# Patient Record
Sex: MALE | Race: WHITE | NOT HISPANIC OR LATINO | Employment: UNEMPLOYED | URBAN - METROPOLITAN AREA
[De-identification: names, ages, dates, MRNs, and addresses within clinical notes are randomized per-mention and may not be internally consistent; named-entity substitution may affect disease eponyms.]

---

## 2017-05-01 ENCOUNTER — OFFICE VISIT (OUTPATIENT)
Dept: PEDIATRICS | Facility: CLINIC | Age: 82
End: 2017-05-01
Payer: COMMERCIAL

## 2017-05-01 VITALS
SYSTOLIC BLOOD PRESSURE: 136 MMHG | TEMPERATURE: 97.3 F | BODY MASS INDEX: 21.92 KG/M2 | WEIGHT: 135.8 LBS | OXYGEN SATURATION: 98 % | HEART RATE: 66 BPM | DIASTOLIC BLOOD PRESSURE: 60 MMHG

## 2017-05-01 DIAGNOSIS — M54.41 CHRONIC RIGHT-SIDED LOW BACK PAIN WITH RIGHT-SIDED SCIATICA: Primary | ICD-10-CM

## 2017-05-01 DIAGNOSIS — M51.369 DDD (DEGENERATIVE DISC DISEASE), LUMBAR: ICD-10-CM

## 2017-05-01 DIAGNOSIS — M99.53 INTERVERTEBRAL DISC STENOSIS OF NEURAL CANAL OF LUMBAR REGION: ICD-10-CM

## 2017-05-01 DIAGNOSIS — G89.29 CHRONIC RIGHT-SIDED LOW BACK PAIN WITH RIGHT-SIDED SCIATICA: Primary | ICD-10-CM

## 2017-05-01 PROCEDURE — 99214 OFFICE O/P EST MOD 30 MIN: CPT | Performed by: INTERNAL MEDICINE

## 2017-05-01 NOTE — MR AVS SNAPSHOT
"              After Visit Summary   2017    Jessica Mckeon    MRN: [id number]           Patient Information     Date Of Birth          10/26/1929        Visit Information        Provider Department      2017 1:30 PM Kale Thompson MD; [place] [employer] [facility] Laureen        Today's Diagnoses     Chronic right-sided low back pain with right-sided sciatica    -  1      Care Instructions    [facility] Radiology will contact you to schedule an MRI of your low back   Or call Radiology [phone]         Follow-ups after your visit        Future tests that were ordered for you today     Open Future Orders        Priority Expected Expires Ordered    MR Lumbar Spine w/o Contrast Routine  2018            Who to contact     If you have questions or need follow up information about today's clinic visit or your schedule please contact [facility] directly at [phone].  Normal or non-critical lab and imaging results will be communicated to you by MyChart, letter or phone within 4 business days after the clinic has received the results. If you do not hear from us within 7 days, please contact the clinic through iMemories[employer] or phone. If you have a critical or abnormal lab result, we will notify you by phone as soon as possible.  Submit refill requests through [employer] ([employer]) or call your pharmacy and they will forward the refill request to us. Please allow 3 business days for your refill to be completed.          Additional Information About Your Visit        MyChart Information     [employer] lets you send messages to your doctor, view your test results, renew your prescriptions, schedule appointments and more. To sign up, go to [email or web address]) . Click on \"Log in\" on the left side of the screen, which will take you to the Welcome page. Then click on \"Sign up Now\" on the right side of the page.     You will be asked to enter the access code listed below, as " well as some personal information. Please follow the directions to create your username and password.     Your access code is: -[email or web address]  Expires: 2017  2:20 PM     Your access code will  in 90 days. If you need help or a new code, please call your [place] clinic or [phone].        Care EveryWhere ID     This is your Care EveryWhere ID. This could be used by other organizations to access your [place] medical records  [id number]        Your Vitals Were     Pulse Temperature Pulse Oximetry BMI (Body Mass Index)          66 97.3  F (36.3  C) (Oral) 98% 21.92 kg/m2         Blood Pressure from Last 3 Encounters:   17 136/60   10/28/16 148/80   10/26/16 140/69    Weight from Last 3 Encounters:   17 135 lb 12.8 oz (61.6 kg)   10/28/16 135 lb (61.2 kg)   10/26/16 135 lb 8 oz (61.5 kg)               Primary Care Provider Office Phone # Fax #    Kale Thompson [phone]       [facility] [address] [place] [address]        Thank you!     Thank you for choosing [facility]  for your care. Our goal is always to provide you with excellent care. Hearing back from our patients is one way we can continue to improve our services. Please take a few minutes to complete the written survey that you may receive in the mail after your visit with us. Thank you!             Your Updated Medication List - Protect others around you: Learn how to safely use, store and throw away your medicines at www.disposemymeds.org.          This list is accurate as of: 17  2:20 PM.  Always use your most recent med list.                   Brand Name Dispense Instructions for use    alfuzosin 10 MG 24 hr tablet    UROXATRAL    90 tablet    Take 1 tablet (10 mg) by mouth daily       amLODIPine 5 MG tablet    NORVASC    90 tablet    Take 1 tablet (5 mg) by mouth daily       aspirin 81 MG tablet     30 tablet    Take 1 tablet (81 mg) by mouth daily       atorvastatin 40 MG tablet     LIPITOR    90 tablet    Take 1 tablet (40 mg) by mouth At Bedtime       CALCIUM 600 + D PO          chlordiazePOXIDE 5 MG capsule    LIBRIUM    90 capsule    Take 1 capsule (5 mg) by mouth 3 times daily as needed for anxiety       CLONAZEPAM PO      Take 0.5 mg by mouth At Bedtime       DONEPEZIL HCL PO      Take 10 mg by mouth Reported on 5/1/2017       finasteride 5 MG tablet    PROSCAR    90 tablet    Take 1 tablet (5 mg) by mouth daily       fluticasone 50 MCG/ACT spray    FLONASE    16 g    Spray 1-2 sprays into both nostrils daily       hydrocortisone 25 MG Suppository    ANUSOL-HC    30 suppository    Place 1 suppository (25 mg) rectally 2 times daily as needed for hemorrhoids       LANSOPRAZOLE PO      Take 30 mg by mouth daily       lisinopril 10 MG tablet    PRINIVIL/ZESTRIL    90 tablet    Take 1 tablet (10 mg) by mouth daily       MAGNESIUM PO      Take by mouth daily Reported on 5/1/2017       metoprolol 25 MG 24 hr tablet    TOPROL-XL    90 tablet    Take 1 tablet (25 mg) by mouth daily       omega 3 1000 MG Caps      Reported on 5/1/2017       order for DME     2 each    Jobst Stockings. Knee high. Medium compression (20-30 mmHg). Dispense 2 pairs.       sennosides 8.6 MG tablet    SENOKOT    120 each    Take 1 tablet by mouth as needed       traMADol 50 MG tablet    ULTRAM    30 tablet    Take 1 tablet (50 mg) by mouth every 6 hours as needed for pain       vitamin D 1000 UNITS capsule      Take 1 capsule by mouth daily Reported on 5/1/2017

## 2017-05-01 NOTE — NURSING NOTE
"Chief Complaint   Patient presents with     Back Pain       Initial /60 (Cuff Size: Adult Regular)  Pulse 66  Temp 97.3  F (36.3  C) (Oral)  Wt 135 lb 12.8 oz (61.6 kg)  SpO2 98%  BMI 21.92 kg/m2 Estimated body mass index is 21.92 kg/(m^2) as calculated from the following:    Height as of 10/28/16: 5' 6\" (1.676 m).    Weight as of this encounter: 135 lb 12.8 oz (61.6 kg).  Medication Reconciliation: complete    Elizabeth Lovett   "

## 2017-05-01 NOTE — PATIENT INSTRUCTIONS
[facility] Radiology will contact you to schedule an MRI of your low back   Or call Radiology [phone]

## 2017-05-01 NOTE — PROGRESS NOTES
"  SUBJECTIVE:                                                    Jessica Mckeon is a 87 year old male who presents with an interpretor to clinic today for the following health issues:      Musculoskeletal problem/pain      Duration: 3-4 months.    Description  Location: Right side down the leg.     Intensity:  When laying down 9/10    Accompanying signs and symptoms: radiation of pain to right leg.     History  Previous similar problem: no   Previous evaluation:  Saw doctor in [place]. Pt had x-ray there. Doc said to f/u PCP and get MRI.     Precipitating or alleviating factors:  Trauma or overuse: no   Aggravating factors include: laying down.     Therapies tried and outcome: electric heating pad helps a little bit.      Initial pain began while in [place].  Had plain films while there, recommended having MRI done.    Was treated w/ pain medications - gabapentin was one.  When sitting or walking, no pain.  9-10/10 pain when lying flat.  Taking acetaminophen.     Has a cardiac stent, placed 3/22/2011:  \"2.75 x 15 mm Promus drug-eluting stent\"    Did have a cardiac MRI done last July w/o issue.  No other metal concerns.    Has hx of disc surgery at L4-5 about 25 years ago.    Problem list and histories reviewed & adjusted, as indicated.    Labs reviewed in EPIC    ROS:  Constitutional, HEENT, cardiovascular, pulmonary, gi and gu systems are negative, except as otherwise noted.    OBJECTIVE:                                                    /60 (Cuff Size: Adult Regular)  Pulse 66  Temp 97.3  F (36.3  C) (Oral)  Wt 135 lb 12.8 oz (61.6 kg)  SpO2 98%  BMI 21.92 kg/m2  Body mass index is 21.92 kg/(m^2).  GEN: No distress  SKIN: No rashes  NECK: Supple. No LAD or TM.  LUNGS: Clear to auscultation bilaterally. No rhonchi, rales, wheezes or retractions.  CV: Regular rate and rhythm.  No murmurs, rubs or gallops. Pulses 2+ radial.  ABD: Bowel sounds positive throughout. Soft, nontender, nondistended. No " organomegaly. No masses.  BACK: Tenderness with palpation along the mid to lower lumbar spine. Tenderness with palpation along paraspinal muscles especially to the right. No CVA tenderness.  EXTR: No LE edema. SLR w/ some increase in LBP.  NEURO: Alert, interactive. No focal motor deficits. Sensation is grossly intact BLE.   PSYCH: Normal affect. Well groomed. Good eye contact.      ASSESSMENT/PLAN:                                                        ICD-10-CM    1. Chronic right-sided low back pain with right-sided sciatica M54.41 MR Lumbar Spine w/o Contrast    G89.29    2. DDD (degenerative disc disease), lumbar M51.36 NEUROSURGERY REFERRAL   3. Intervertebral disc stenosis of neural canal of lumbar region M99.53 NEUROSURGERY REFERRAL   persistent low back pain w/ sciatica sx. Ongoing for the past several weeks. Having significant pain.    MRI ordered and reviewed:  MR Lumbar Spine w/o Contrast    Impression    IMPRESSION:  1. 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 Posterior decompressions are noted at L4-L5 and L5-S1 as  well.  2. Multilevel foraminal stenosis as above. A small synovial cyst is  noted in the dorsolateral aspect of the left L5 neural foramen.    ROBIN BOOTH MD     Recommend neurosurgery evaluation. Pt's son contacted and referral placed.   Question injection therapy vs surgical.   Call if pain worsens, can help with pain medication if needed      Kale Thompson MD  [facility]

## 2017-05-02 ENCOUNTER — TELEPHONE (OUTPATIENT)
Dept: PEDIATRICS | Facility: CLINIC | Age: 82
End: 2017-05-02

## 2017-05-02 DIAGNOSIS — E78.2 MIXED HYPERLIPIDEMIA: Primary | ICD-10-CM

## 2017-05-02 RX ORDER — ROSUVASTATIN CALCIUM 20 MG/1
20 TABLET, COATED ORAL DAILY
Qty: 90 TABLET | Refills: 1 | Status: SHIPPED | OUTPATIENT
Start: 2017-05-02 | End: 2017-08-04

## 2017-05-02 NOTE — TELEPHONE ENCOUNTER
Received request from pharmacy that patient wants to switch from Lipitor 40 MG to Crestor. Patient has not had a lipid profile in 2 yrs. Patient was just seen yesterday. Son's number is temporarily not in service.

## 2017-05-03 ENCOUNTER — HOSPITAL ENCOUNTER (OUTPATIENT)
Dept: MRI IMAGING | Facility: CLINIC | Age: 82
Discharge: HOME | End: 2017-05-03
Attending: INTERNAL MEDICINE | Admitting: INTERNAL MEDICINE
Payer: MEDICARE

## 2017-05-03 DIAGNOSIS — G89.29 CHRONIC RIGHT-SIDED LOW BACK PAIN WITH RIGHT-SIDED SCIATICA: ICD-10-CM

## 2017-05-03 DIAGNOSIS — M54.41 CHRONIC RIGHT-SIDED LOW BACK PAIN WITH RIGHT-SIDED SCIATICA: ICD-10-CM

## 2017-05-03 PROCEDURE — 72148 MRI LUMBAR SPINE W/O DYE: CPT

## 2017-05-08 ENCOUNTER — TRANSFERRED RECORDS (OUTPATIENT)
Dept: HEALTH INFORMATION MANAGEMENT | Facility: CLINIC | Age: 82
End: 2017-05-08

## 2017-06-13 DIAGNOSIS — K21.9 GASTROESOPHAGEAL REFLUX DISEASE WITHOUT ESOPHAGITIS: Primary | ICD-10-CM

## 2017-06-13 RX ORDER — RABEPRAZOLE SODIUM 20 MG/1
TABLET, DELAYED RELEASE ORAL
Qty: 90 TABLET | Refills: 3 | Status: SHIPPED | OUTPATIENT
Start: 2017-06-13 | End: 2017-08-04

## 2017-06-13 NOTE — TELEPHONE ENCOUNTER
RABEprazole (ACIPHEX) 20 MG tablet       Last Written Prescription Date: 3/8/2016  Last Fill Quantity: 90,  # refills: 1   Last Office Visit with [facility], [facility] or [facility] prescribing provider: 5/1/2017                                         Next 5 appointments (look out 90 days)     Jun 19, 2017  1:00 PM CDT   Office Visit with Kale Thompson MD   [facility]    [address] [place] [address]  [address] [place] [id number] [phone]

## 2017-06-16 ENCOUNTER — OFFICE VISIT (OUTPATIENT)
Dept: OPTOMETRY | Facility: CLINIC | Age: 82
End: 2017-06-16
Payer: COMMERCIAL

## 2017-06-16 DIAGNOSIS — Z96.1 PSEUDOPHAKIA: ICD-10-CM

## 2017-06-16 DIAGNOSIS — H52.03 HYPEROPIA WITH ASTIGMATISM AND PRESBYOPIA, BILATERAL: Primary | ICD-10-CM

## 2017-06-16 DIAGNOSIS — H04.123 DRY EYES: ICD-10-CM

## 2017-06-16 DIAGNOSIS — H52.203 HYPEROPIA WITH ASTIGMATISM AND PRESBYOPIA, BILATERAL: Primary | ICD-10-CM

## 2017-06-16 DIAGNOSIS — H01.001 BLEPHARITIS OF UPPER EYELIDS OF BOTH EYES, UNSPECIFIED TYPE: ICD-10-CM

## 2017-06-16 DIAGNOSIS — H52.4 HYPEROPIA WITH ASTIGMATISM AND PRESBYOPIA, BILATERAL: Primary | ICD-10-CM

## 2017-06-16 DIAGNOSIS — H01.004 BLEPHARITIS OF UPPER EYELIDS OF BOTH EYES, UNSPECIFIED TYPE: ICD-10-CM

## 2017-06-16 PROCEDURE — 92015 DETERMINE REFRACTIVE STATE: CPT | Performed by: OPTOMETRIST

## 2017-06-16 PROCEDURE — 92004 COMPRE OPH EXAM NEW PT 1/>: CPT | Performed by: OPTOMETRIST

## 2017-06-16 ASSESSMENT — REFRACTION_WEARINGRX
OS_AXIS: 001
OD_CYLINDER: +2.25
OD_ADD: +2.75
OS_CYLINDER: +1.75
OD_SPHERE: -0.75
OD_AXIS: 165
OS_SPHERE: -1.25
OS_ADD: +2.75

## 2017-06-16 ASSESSMENT — REFRACTION_MANIFEST
OD_SPHERE: -0.25
OS_AXIS: 177
OS_SPHERE: -0.75
OS_ADD: +2.50
OS_CYLINDER: +2.50
OD_ADD: +2.50
OD_AXIS: 176
OS_CYLINDER: +1.75
OD_CYLINDER: +2.25
OD_CYLINDER: +2.50
OS_AXIS: 007
OD_SPHERE: -0.75
OS_SPHERE: -1.25
OD_AXIS: 166
METHOD_AUTOREFRACTION: 1

## 2017-06-16 ASSESSMENT — VISUAL ACUITY
CORRECTION_TYPE: GLASSES
OS_CC: 20/20
OS_CC+: -1
METHOD: SNELLEN - LINEAR
OD_CC: 20/20
OD_CC: 20/30
OS_SC: 20/125
OS_CC: 20/30
OD_SC: 20/125

## 2017-06-16 ASSESSMENT — KERATOMETRY
METHOD_AUTO_MANUAL: AUTOMATED
OD_AXISANGLE2_DEGREES: 85
OS_AXISANGLE2_DEGREES: 91
OS_K1POWER_DIOPTERS: 42.00
OD_AXISANGLE_DEGREES: 175
OD_K1POWER_DIOPTERS: 42.00
OD_K2POWER_DIOPTERS: 44.75
OS_AXISANGLE_DEGREES: 1
OS_K2POWER_DIOPTERS: 44.62

## 2017-06-16 ASSESSMENT — CONF VISUAL FIELD
OD_NORMAL: 1
OS_NORMAL: 1

## 2017-06-16 ASSESSMENT — TONOMETRY
OS_IOP_MMHG: 15
IOP_METHOD: APPLANATION
OD_IOP_MMHG: 15

## 2017-06-16 ASSESSMENT — CUP TO DISC RATIO
OS_RATIO: 0.4
OD_RATIO: 0.45

## 2017-06-16 ASSESSMENT — EXTERNAL EXAM - LEFT EYE: OS_EXAM: MULTIPLE NEVI

## 2017-06-16 ASSESSMENT — SLIT LAMP EXAM - LIDS: COMMENTS: 2+ BLEPHARITIS

## 2017-06-16 NOTE — PATIENT INSTRUCTIONS
Blepharitis is a chronic or long term inflammation of the eyelids and eyelashes. It affects all ages. Causes include poor eyelid hygiene, excess oil production, staph bacteria or an allergic reaction.    Blepharitis may appear as greasy flakes on the base of the eyelashes , crusting of eyelashes and mild redness of the eyelid margins.  Sometimes it may result in an acute infection of a gland in the eyelid called a stye and sometimes painless firm nodules can form in the eyelid.    Treatment includes warm compresses and lid hygiene with an antimicrobial lid scrub and sometimes a prescription ointment.      Hot compresses/ warm soaks  Warm compresses are very beneficial to the normal functioning of the eye.   They help loosen up the eyelid debris that has collected on the eyelash follicles.  Eyelid cleansers maintain clean and healthy eyelid margins.      Directions for warm soaks  There are few methods for hot compresses. Moisten a washcloth with hot water, or microwave for 10 seconds, being careful to not get the cloth too hot.   Then put the washcloth onto your eyelids for 5 minutes. It will cool quickly so a rice pack or eyemask that can be heated and laid on top of the washcloth will help retain the heat.     Do this in am and pm  At bedtime follow with lid cleansing with ocusoft or sterilid lid cleanser  Overabundance of bacterial microorganisms along the eyelashes and lid margins induce stress on the tear film and promote inflammation.  Regular lid hygiene helps diminish the bacterial population to prevent inflammation and infection.  Use a warm compress to loosen crusts   Cleanse lids once daily with a lid cleansing product as directed such as Ocusoft or Sterilid which can be purchased at most pharmacies. Diluted baby shampoo will also work, but not as well.     I recommend using artificial tears for your dry eye. There are over the counter drops that work well and may be used up to 4 x daily. ( systane ,  twila dietrich) If you need more than 4 drops daily, use a preservative free product which come in individual vials and may be used for 24 hours and discarded.     No prescription change needed      You may use ointment for a week 1-2x daily then discontinue

## 2017-06-16 NOTE — MR AVS SNAPSHOT
After Visit Summary   6/16/2017    Jessica Mckeon    MRN: [id number]           Patient Information     Date Of Birth          10/26/1929        Visit Information        Provider Department      6/16/2017 7:15 AM Open, Assignments; Anila Lehman, [facility]        Today's Diagnoses     Hyperopia with astigmatism and presbyopia, bilateral    -  1    Blepharitis of upper eyelids of both eyes, unspecified type        Dry eyes        Pseudophakia          Care Instructions    Blepharitis is a chronic or long term inflammation of the eyelids and eyelashes. It affects all ages. Causes include poor eyelid hygiene, excess oil production, staph bacteria or an allergic reaction.    Blepharitis may appear as greasy flakes on the base of the eyelashes , crusting of eyelashes and mild redness of the eyelid margins.  Sometimes it may result in an acute infection of a gland in the eyelid called a stye and sometimes painless firm nodules can form in the eyelid.    Treatment includes warm compresses and lid hygiene with an antimicrobial lid scrub and sometimes a prescription ointment.      Hot compresses/ warm soaks  Warm compresses are very beneficial to the normal functioning of the eye.   They help loosen up the eyelid debris that has collected on the eyelash follicles.  Eyelid cleansers maintain clean and healthy eyelid margins.      Directions for warm soaks  There are few methods for hot compresses. Moisten a washcloth with hot water, or microwave for 10 seconds, being careful to not get the cloth too hot.   Then put the washcloth onto your eyelids for 5 minutes. It will cool quickly so a rice pack or eyemask that can be heated and laid on top of the washcloth will help retain the heat.     Do this in am and pm  At bedtime follow with lid cleansing with ocusoft or sterilid lid cleanser  Overabundance of bacterial microorganisms along the eyelashes and lid margins induce stress on the  tear film and promote inflammation.  Regular lid hygiene helps diminish the bacterial population to prevent inflammation and infection.  Use a warm compress to loosen crusts   Cleanse lids once daily with a lid cleansing product as directed such as Ocusoft or Sterilid which can be purchased at most pharmacies. Diluted baby shampoo will also work, but not as well.     I recommend using artificial tears for your dry eye. There are over the counter drops that work well and may be used up to 4 x daily. ( systane , refresh, thera tears) If you need more than 4 drops daily, use a preservative free product which come in individual vials and may be used for 24 hours and discarded.     No prescription change needed      You may use ointment for a week 1-2x daily then discontinue             Follow-ups after your visit        Your next 10 appointments already scheduled     Jun 19, 2017 12:45 PM CDT   [facility]    [address] [place] [id number] [phone]           Bring a current list of meds and any records pertaining to this visit.  For Physicals, please bring immunization records and any forms needing to be filled out.  Please arrive 10 minutes early to complete paperwork.              Who to contact     If you have questions or need follow up information about today's clinic visit or your schedule please contact [facility] directly at [phone].  Normal or non-critical lab and imaging results will be communicated to you by MyChart, letter or phone within 4 business days after the clinic has received the results. If you do not hear from us within 7 days, please contact the clinic through MyChart or phone. If you have a critical or abnormal lab result, we will notify you by phone as soon as possible.  Submit refill requests through [employer] or call your pharmacy and they will forward the refill  "request to us. Please allow 3 business days for your refill to be completed.          Additional Information About Your Visit        [email or web address] Information     [email or web address] lets you send messages to your doctor, view your test results, renew your prescriptions, schedule appointments and more. To sign up, go to [email or web address] . Click on \"Log in\" on the left side of the screen, which will take you to the Welcome page. Then click on \"Sign up Now\" on the right side of the page.     You will be asked to enter the access code listed below, as well as some personal information. Please follow the directions to create your username and password.     Your access code is: -[email or web address]  Expires: 2017  2:20 PM     Your access code will  in 90 days. If you need help or a new code, please call your [place] clinic or [phone].        Care EveryWhere ID     This is your Care EveryWhere ID. This could be used by other organizations to access your [place] medical records  [id number]         Blood Pressure from Last 3 Encounters:   17 136/60   10/28/16 148/80   10/26/16 140/69    Weight from Last 3 Encounters:   17 61.6 kg (135 lb 12.8 oz)   10/28/16 61.2 kg (135 lb)   10/26/16 61.5 kg (135 lb 8 oz)              Today, you had the following     No orders found for display       Primary Care Provider Office Phone # Fax #    Kale Thompson [phone]       [facility] [address] [place] [address]        Thank you!     Thank you for choosing [facility]  for your care. Our goal is always to provide you with excellent care. Hearing back from our patients is one way we can continue to improve our services. Please take a few minutes to complete the written survey that you may receive in the mail after your visit with us. Thank you!             Your Updated Medication List - Protect others around you: Learn how to safely use, store and throw away your " medicines at www.disposemymeds.org.          This list is accurate as of: 6/16/17  7:53 AM.  Always use your most recent med list.                   Brand Name Dispense Instructions for use    alfuzosin 10 MG 24 hr tablet    UROXATRAL    90 tablet    Take 1 tablet (10 mg) by mouth daily       amLODIPine 5 MG tablet    NORVASC    90 tablet    Take 1 tablet (5 mg) by mouth daily       aspirin 81 MG tablet     30 tablet    Take 1 tablet (81 mg) by mouth daily       CALCIUM 600 + D PO          chlordiazePOXIDE 5 MG capsule    LIBRIUM    90 capsule    Take 1 capsule (5 mg) by mouth 3 times daily as needed for anxiety       CLONAZEPAM PO      Take 0.5 mg by mouth At Bedtime       DONEPEZIL HCL PO      Take 10 mg by mouth Reported on 5/1/2017       finasteride 5 MG tablet    PROSCAR    90 tablet    Take 1 tablet (5 mg) by mouth daily       fluticasone 50 MCG/ACT spray    FLONASE    16 g    Spray 1-2 sprays into both nostrils daily       hydrocortisone 25 MG Suppository    ANUSOL-HC    30 suppository    Place 1 suppository (25 mg) rectally 2 times daily as needed for hemorrhoids       LANSOPRAZOLE PO      Take 30 mg by mouth daily       lisinopril 10 MG tablet    PRINIVIL/ZESTRIL    90 tablet    Take 1 tablet (10 mg) by mouth daily       MAGNESIUM PO      Take by mouth daily Reported on 5/1/2017       metoprolol 25 MG 24 hr tablet    TOPROL-XL    90 tablet    Take 1 tablet (25 mg) by mouth daily       omega 3 1000 MG Caps      Reported on 5/1/2017       order for DME     2 each    Jobst Stockings. Knee high. Medium compression (20-30 mmHg). Dispense 2 pairs.       RABEprazole 20 MG EC tablet    ACIPHEX    90 tablet    TAKE ONE TABLET BY MOUTH ONCE DAILY       rosuvastatin 20 MG tablet    CRESTOR    90 tablet    Take 1 tablet (20 mg) by mouth daily       sennosides 8.6 MG tablet    SENOKOT    120 each    Take 1 tablet by mouth as needed       traMADol 50 MG tablet    ULTRAM    30 tablet    Take 1 tablet (50 mg) by mouth  every 6 hours as needed for pain       vitamin D 1000 UNITS capsule      Take 1 capsule by mouth daily Reported on 5/1/2017

## 2017-06-16 NOTE — PROGRESS NOTES
Chief Complaint   Patient presents with     COMPREHENSIVE EYE EXAM     Routine    Using Tobrex in OS, painful   He was given this in [place] by another doctor  Tearing and redness has used lid hygiene and artificial tears  And tobrex ointment which helped started with pain in OS last night    Last Eye Exam: 1yr  Dilated Previously: Yes    What are you currently using to see?  glasses       Distance Vision Acuity: Satisfied with vision    Near Vision Acuity: Satisfied with vision while reading  with glasses    Eye Comfort: OD painful  Do you use eye drops? : No  Occupation or Hobbies: TV              Medical, surgical and family histories reviewed and updated 6/16/2017.       OBJECTIVE: See Ophthalmology exam    ASSESSMENT:    ICD-10-CM    1. Hyperopia with astigmatism and presbyopia, bilateral H52.03 REFRACTION    H52.203 EYE EXAM (SIMPLE-NONBILLABLE)   2. Blepharitis of upper eyelids of both eyes, unspecified type H01.001 EYE EXAM (SIMPLE-NONBILLABLE)    H01.004    3. Dry eyes H04.123    4. Pseudophakia Z96.1       PLAN:   Ongoing blepharitis treatment , may continue with use of tobrex ointment for the next week and discontinue   Artificial tears  Up to four times daily  Patient is here with his wife who states lesions have been checked on his face by derm advised to follow    Anila Lehman OD

## 2017-06-19 ENCOUNTER — OFFICE VISIT (OUTPATIENT)
Dept: PEDIATRICS | Facility: CLINIC | Age: 82
End: 2017-06-19
Payer: COMMERCIAL

## 2017-06-19 VITALS
BODY MASS INDEX: 21.81 KG/M2 | SYSTOLIC BLOOD PRESSURE: 140 MMHG | OXYGEN SATURATION: 97 % | DIASTOLIC BLOOD PRESSURE: 60 MMHG | TEMPERATURE: 97.5 F | HEIGHT: 66 IN | WEIGHT: 135.7 LBS | HEART RATE: 61 BPM

## 2017-06-19 DIAGNOSIS — G25.81 RESTLESS LEGS: ICD-10-CM

## 2017-06-19 DIAGNOSIS — R42 VERTIGO: ICD-10-CM

## 2017-06-19 DIAGNOSIS — I25.10 ASCVD (ARTERIOSCLEROTIC CARDIOVASCULAR DISEASE): ICD-10-CM

## 2017-06-19 DIAGNOSIS — I10 ESSENTIAL HYPERTENSION: Primary | ICD-10-CM

## 2017-06-19 DIAGNOSIS — I95.1 ORTHOSTASIS: ICD-10-CM

## 2017-06-19 DIAGNOSIS — D64.9 ANEMIA, UNSPECIFIED TYPE: ICD-10-CM

## 2017-06-19 LAB
ERYTHROCYTE [DISTWIDTH] IN BLOOD BY AUTOMATED COUNT: 13.7 % (ref 10–15)
FERRITIN SERPL-MCNC: 32 NG/ML (ref 26–388)
HCT VFR BLD AUTO: 35.8 % (ref 40–53)
HGB BLD-MCNC: 11.7 G/DL (ref 13.3–17.7)
IRON SERPL-MCNC: 63 UG/DL (ref 35–180)
MCH RBC QN AUTO: 27.9 PG (ref 26.5–33)
MCHC RBC AUTO-ENTMCNC: 32.7 G/DL (ref 31.5–36.5)
MCV RBC AUTO: 85 FL (ref 78–100)
PLATELET # BLD AUTO: 169 10E9/L (ref 150–450)
RBC # BLD AUTO: 4.19 10E12/L (ref 4.4–5.9)
WBC # BLD AUTO: 6 10E9/L (ref 4–11)

## 2017-06-19 PROCEDURE — 36415 COLL VENOUS BLD VENIPUNCTURE: CPT | Performed by: INTERNAL MEDICINE

## 2017-06-19 PROCEDURE — 99215 OFFICE O/P EST HI 40 MIN: CPT | Performed by: INTERNAL MEDICINE

## 2017-06-19 PROCEDURE — 80053 COMPREHEN METABOLIC PANEL: CPT | Performed by: INTERNAL MEDICINE

## 2017-06-19 PROCEDURE — 83540 ASSAY OF IRON: CPT | Performed by: INTERNAL MEDICINE

## 2017-06-19 PROCEDURE — 85027 COMPLETE CBC AUTOMATED: CPT | Performed by: INTERNAL MEDICINE

## 2017-06-19 PROCEDURE — 82728 ASSAY OF FERRITIN: CPT | Performed by: INTERNAL MEDICINE

## 2017-06-19 PROCEDURE — 84443 ASSAY THYROID STIM HORMONE: CPT | Performed by: INTERNAL MEDICINE

## 2017-06-19 RX ORDER — METOPROLOL SUCCINATE 25 MG/1
12.5 TABLET, EXTENDED RELEASE ORAL DAILY
Qty: 90 TABLET | Refills: 0
Start: 2017-06-19 | End: 2017-07-31

## 2017-06-19 NOTE — LETTER
[facility]  [address]  [place] [address]                  [phone]   June 22, 2017    Neilobbo XIOMARA Mike  [address]  [place] [address]          Jessica:    Your tests are all complete. The results show:    1. Your TSH (thyroid function) is normal.    2. Your metabolic panel, including liver and kidney function, glucose (blood sugar) and electrolytes, is within expected limits.    3. Your blood counts show that you are mildly anemic (low red blood count or hemoglobin). Your current level is just slightly lower than your previous checks, but this should not cause any significant symptoms.    Your iron level and your ferritin (iron stores) are both in the normal range.    I do not see a specific cause for your symptoms based on these results. I will contact you once your echocardiogram is complete.       In the meantime, please feel free to contact me if you have any questions or concerns.      Kale Thompson MD      Results for orders placed or performed in visit on 06/19/17   CBC with platelets   Result Value Ref Range    WBC 6.0 4.0 - 11.0 10e9/L    RBC Count 4.19 (L) 4.4 - 5.9 10e12/L    Hemoglobin 11.7 (L) 13.3 - 17.7 g/dL    Hematocrit 35.8 (L) 40.0 - 53.0 %    MCV 85 78 - 100 fl    MCH 27.9 26.5 - 33.0 pg    MCHC 32.7 31.5 - 36.5 g/dL    RDW 13.7 10.0 - 15.0 %    Platelet Count 169 150 - 450 10e9/L   TSH with free T4 reflex   Result Value Ref Range    TSH 3.18 0.40 - 4.00 mU/L   Iron   Result Value Ref Range    Iron 63 35 - 180 ug/dL   Ferritin   Result Value Ref Range    Ferritin 32 26 - 388 ng/mL   Comprehensive metabolic panel   Result Value Ref Range    Sodium 139 133 - 144 mmol/L    Potassium 4.0 3.4 - 5.3 mmol/L    Chloride 105 94 - 109 mmol/L    Carbon Dioxide 29 20 - 32 mmol/L    Anion Gap 5 3 - 14 mmol/L    Glucose 98 70 - 99 mg/dL    Urea Nitrogen 28 7 - 30 mg/dL    Creatinine 1.17 0.66 - 1.25 mg/dL    GFR Estimate 59 (L) >60 mL/min/1.7m2    GFR  Estimate If Black 71 >60 mL/min/1.7m2    Calcium 8.5 8.5 - 10.1 mg/dL    Bilirubin Total 0.3 0.2 - 1.3 mg/dL    Albumin 4.1 3.4 - 5.0 g/dL    Protein Total 7.1 6.8 - 8.8 g/dL    Alkaline Phosphatase 49 40 - 150 U/L    ALT 17 0 - 70 U/L    AST 26 0 - 45 U/L

## 2017-06-19 NOTE — NURSING NOTE
"Chief Complaint   Patient presents with     Dizziness       Initial /60 (Cuff Size: Adult Regular)  Pulse 61  Temp 97.5  F (36.4  C) (Oral)  Ht 5' 6\" (1.676 m)  Wt 135 lb 11.2 oz (61.6 kg)  SpO2 97%  BMI 21.9 kg/m2 Estimated body mass index is 21.9 kg/(m^2) as calculated from the following:    Height as of this encounter: 5' 6\" (1.676 m).    Weight as of this encounter: 135 lb 11.2 oz (61.6 kg).  Medication Reconciliation: sujata Lovett   "

## 2017-06-19 NOTE — PATIENT INSTRUCTIONS
Check labwork today   I will contact you with the results    You will be contacted to schedule an echocardiogram (Heart ultrasound)   Or call [facility]:  [phone]    Cut your metoprolol dose in 1/2   Take 1/2 tablet once per day    Set up a visit with Dr. Kulkarni

## 2017-06-19 NOTE — PROGRESS NOTES
"    SUBJECTIVE:                                                    Jessica Mckeon is a 87 year old male who presents with an interpretor to clinic today for the following health issues:      Pt states that at bedtime his body \"jumps\" while he is sleeping. He is also have cramps in his calves. Pt states when he stands up he feels lightheaded.     Issues reviewed w/ the help of an interpretor:  - Feet feel cold at nighttime. Wearing extra socks.    - When he sleeps, his body \"jumps\" and has difficulty being still. Snores at nighttime. Dreams which are sometimes nightmares at nighttime. Will wake screaming at times.     - Feels that the muscles in his legs are tense.     - Feels \"dizzy\" at times. Especially with position changes, going from lying or sitting to standing. Sometimes w/ walking.  No recent changes in medications.    Has hx of ASCVD. No recent sx of chest pains. No orthopnea or PND.     Anemia hx. Reviewed last lab results.  Component      Latest Ref Rng & Units 3/3/2016 7/28/2016   Hemoglobin      13.3 - 17.7 g/dL 12.7 (A) 12.3 (L)     No sx of bleeding noted.     Problem list and histories reviewed & adjusted, as indicated.    Labs reviewed in EPIC    Reviewed and updated as needed this visit by clinical staff  Tobacco  Allergies  Meds  Med Hx  Surg Hx  Fam Hx  Soc Hx      Reviewed and updated as needed this visit by Provider  Tobacco  Allergies  Meds  Problems  Med Hx  Surg Hx  Fam Hx  Soc Hx          ROS:  Constitutional, HEENT, cardiovascular, pulmonary, gi and gu systems are negative, except as otherwise noted.    OBJECTIVE:                                                    /60 (Cuff Size: Adult Regular)  Pulse 61  Temp 97.5  F (36.4  C) (Oral)  Ht 5' 6\" (1.676 m)  Wt 135 lb 11.2 oz (61.6 kg)  SpO2 97%  BMI 21.9 kg/m2  Body mass index is 21.9 kg/(m^2).  GEN: No distress  SKIN: No rashes  HEENT: PERRL. EOMI. TM's clear bilaterally. Nasal mucosa normal. OP moist without " lesions.  NECK: Supple. No LAD or TM. No bruits.  LUNGS: Clear to auscultation bilaterally. No rhonchi, rales, wheezes or retractions.  CV: Regular rate and rhythm.  No murmurs, rubs or gallops. Pulses 2+ radial.  ABD: Bowel sounds positive throughout. Soft, nontender, nondistended. No organomegaly. No masses.  EXTR: trace LE edema.  PSYCH: Normal affect. Well groomed. Good eye contact.  NEURO: A, O x3. CN 2-12 grossly intact. No focal motor deficits.      ASSESSMENT/PLAN:                                                        ICD-10-CM    1. Essential hypertension I10 Comprehensive metabolic panel     Echocardiogram Complete     metoprolol (TOPROL-XL) 25 MG 24 hr tablet   2. Vertigo R42 CBC with platelets     TSH with free T4 reflex     Iron     Ferritin     Comprehensive metabolic panel     Echocardiogram Complete   3. Restless legs G25.81 CBC with platelets     Iron     Ferritin     Comprehensive metabolic panel   4. Orthostasis I95.1 CBC with platelets     TSH with free T4 reflex     Iron     Ferritin     Comprehensive metabolic panel     Echocardiogram Complete   5. ASCVD (arteriosclerotic cardiovascular disease) I25.10 TSH with free T4 reflex     metoprolol (TOPROL-XL) 25 MG 24 hr tablet   6. Anemia, unspecified type D64.9 CBC with platelets     TSH with free T4 reflex     Iron     Ferritin     HTN with vertigo sx w/ hx of anemia and ASCVD.   Clinically sx are not likely ASCVD related. Could be orthostatic. Potentially change in valve or heart function. Potentially anemia worsening w/ w/o iron deficiency.     Also w/ sx of restless legs, consider iron deficiency.     Patient Instructions   Check labwork today   I will contact you with the results    You will be contacted to schedule an echocardiogram (Heart ultrasound)   Or call [facility]:  [phone]    Cut your metoprolol dose in 1/2   Take 1/2 tablet once per day    Set up a visit with Dr. Kulkarni      A total of 45 minutes was spent in  face-to-face contact with Jessica willis in clinic, of which >50% was for counseling of vertigo symptoms, review of sx w/ pt and his interpretor, discussion about medication changes, possible causes for sx, labwork and other follow-up instructions.      Kale Thompson MD  [facility]

## 2017-06-19 NOTE — MR AVS SNAPSHOT
After Visit Summary   6/19/2017    Jessica Mckeon    MRN: [id number]           Patient Information     Date Of Birth          10/26/1929        Visit Information        Provider Department      6/19/2017 12:45 PM Kale Thompson MD; [facility]        Today's Diagnoses     Essential hypertension    -  1    Vertigo        Restless legs        Orthostasis        ASCVD (arteriosclerotic cardiovascular disease)        Anemia, unspecified type          Care Instructions    Check labwork today   I will contact you with the results    You will be contacted to schedule an echocardiogram (Heart ultrasound)   Or call [facility]:  [phone]    Cut your metoprolol dose in 1/2   Take 1/2 tablet once per day    Set up a visit with Dr. Kulkarni              Follow-ups after your visit        Future tests that were ordered for you today     Open Future Orders        Priority Expected Expires Ordered    Echocardiogram Complete Routine  6/19/2018 6/19/2017            Who to contact     If you have questions or need follow up information about today's clinic visit or your schedule please contact [facility] directly at [phone].  Normal or non-critical lab and imaging results will be communicated to you by [employer], letter or phone within 4 business days after the clinic has received the results. If you do not hear from us within 7 days, please contact the clinic through [employer] or phone. If you have a critical or abnormal lab result, we will notify you by phone as soon as possible.  Submit refill requests through L[employer] or call your pharmacy and they will forward the refill request to us. Please allow 3 business days for your refill to be completed.          Additional Information About Your Visit        MyChart Information     L2 lets you send messages to your doctor, view your test results, renew your prescriptions, schedule appointments and more. To sign  "up, go to [email or web address] . Click on \"Log in\" on the left side of the screen, which will take you to the Welcome page. Then click on \"Sign up Now\" on the right side of the page.     You will be asked to enter the access code listed below, as well as some personal information. Please follow the directions to create your username and password.     Your access code is: [email or web address]  Expires: 2017  2:20 PM     Your access code will  in 90 days. If you need help or a new code, please call your [place] clinic or [phone].        Care EveryWhere ID     This is your Care EveryWhere ID. This could be used by other organizations to access your [place] medical records  [id number]        Your Vitals Were     Pulse Temperature Height Pulse Oximetry BMI (Body Mass Index)       61 97.5  F (36.4  C) (Oral) 5' 6\" (1.676 m) 97% 21.9 kg/m2        Blood Pressure from Last 3 Encounters:   17 140/60   17 136/60   10/28/16 148/80    Weight from Last 3 Encounters:   17 135 lb 11.2 oz (61.6 kg)   17 135 lb 12.8 oz (61.6 kg)   10/28/16 135 lb (61.2 kg)              We Performed the Following     CBC with platelets     Comprehensive metabolic panel     Ferritin     Iron     TSH with free T4 reflex          Today's Medication Changes          These changes are accurate as of: 17  1:47 PM.  If you have any questions, ask your nurse or doctor.               These medicines have changed or have updated prescriptions.        Dose/Directions    metoprolol 25 MG 24 hr tablet   Commonly known as:  TOPROL-XL   This may have changed:  how much to take   Used for:  ASCVD (arteriosclerotic cardiovascular disease), Essential hypertension   Changed by:  Kale Thompson MD        Dose:  12.5 mg   Take 0.5 tablets (12.5 mg) by mouth daily   Quantity:  90 tablet   Refills:  0            Where to get your medicines      Some of these will need a paper prescription and others can be bought over the " counter.  Ask your nurse if you have questions.     You don't need a prescription for these medications     metoprolol 25 MG 24 hr tablet                Primary Care Provider Office Phone # Fax #    Kale Thompson [phone]       [facility] [address] [place] [address]        Thank you!     Thank you for choosing [facility]  for your care. Our goal is always to provide you with excellent care. Hearing back from our patients is one way we can continue to improve our services. Please take a few minutes to complete the written survey that you may receive in the mail after your visit with us. Thank you!             Your Updated Medication List - Protect others around you: Learn how to safely use, store and throw away your medicines at www.disposemymeds.org.          This list is accurate as of: 6/19/17  1:47 PM.  Always use your most recent med list.                   Brand Name Dispense Instructions for use    alfuzosin 10 MG 24 hr tablet    UROXATRAL    90 tablet    Take 1 tablet (10 mg) by mouth daily       amLODIPine 5 MG tablet    NORVASC    90 tablet    Take 1 tablet (5 mg) by mouth daily       aspirin 81 MG tablet     30 tablet    Take 1 tablet (81 mg) by mouth daily       CALCIUM 600 + D PO          chlordiazePOXIDE 5 MG capsule    LIBRIUM    90 capsule    Take 1 capsule (5 mg) by mouth 3 times daily as needed for anxiety       CLONAZEPAM PO      Take 0.5 mg by mouth At Bedtime       DONEPEZIL HCL PO      Take 10 mg by mouth Reported on 5/1/2017       finasteride 5 MG tablet    PROSCAR    90 tablet    Take 1 tablet (5 mg) by mouth daily       fluticasone 50 MCG/ACT spray    FLONASE    16 g    Spray 1-2 sprays into both nostrils daily       hydrocortisone 25 MG Suppository    ANUSOL-HC    30 suppository    Place 1 suppository (25 mg) rectally 2 times daily as needed for hemorrhoids       LANSOPRAZOLE PO      Take 30 mg by mouth daily       lisinopril 10 MG  tablet    PRINIVIL/ZESTRIL    90 tablet    Take 1 tablet (10 mg) by mouth daily       MAGNESIUM PO      Take by mouth daily Reported on 5/1/2017       metoprolol 25 MG 24 hr tablet    TOPROL-XL    90 tablet    Take 0.5 tablets (12.5 mg) by mouth daily       omega 3 1000 MG Caps      Reported on 5/1/2017       order for DME     2 each    Jobst Stockings. Knee high. Medium compression (20-30 mmHg). Dispense 2 pairs.       RABEprazole 20 MG EC tablet    ACIPHEX    90 tablet    TAKE ONE TABLET BY MOUTH ONCE DAILY       rosuvastatin 20 MG tablet    CRESTOR    90 tablet    Take 1 tablet (20 mg) by mouth daily       sennosides 8.6 MG tablet    SENOKOT    120 each    Take 1 tablet by mouth as needed       traMADol 50 MG tablet    ULTRAM    30 tablet    Take 1 tablet (50 mg) by mouth every 6 hours as needed for pain       vitamin D 1000 UNITS capsule      Take 1 capsule by mouth daily Reported on 5/1/2017

## 2017-06-20 LAB
ALBUMIN SERPL-MCNC: 4.1 G/DL (ref 3.4–5)
ALP SERPL-CCNC: 49 U/L (ref 40–150)
ALT SERPL W P-5'-P-CCNC: 17 U/L (ref 0–70)
ANION GAP SERPL CALCULATED.3IONS-SCNC: 5 MMOL/L (ref 3–14)
AST SERPL W P-5'-P-CCNC: 26 U/L (ref 0–45)
BILIRUB SERPL-MCNC: 0.3 MG/DL (ref 0.2–1.3)
BUN SERPL-MCNC: 28 MG/DL (ref 7–30)
CALCIUM SERPL-MCNC: 8.5 MG/DL (ref 8.5–10.1)
CHLORIDE SERPL-SCNC: 105 MMOL/L (ref 94–109)
CO2 SERPL-SCNC: 29 MMOL/L (ref 20–32)
CREAT SERPL-MCNC: 1.17 MG/DL (ref 0.66–1.25)
GFR SERPL CREATININE-BSD FRML MDRD: 59 ML/MIN/1.7M2
GLUCOSE SERPL-MCNC: 98 MG/DL (ref 70–99)
POTASSIUM SERPL-SCNC: 4 MMOL/L (ref 3.4–5.3)
PROT SERPL-MCNC: 7.1 G/DL (ref 6.8–8.8)
SODIUM SERPL-SCNC: 139 MMOL/L (ref 133–144)
TSH SERPL DL<=0.005 MIU/L-ACNC: 3.18 MU/L (ref 0.4–4)

## 2017-07-07 ENCOUNTER — HOSPITAL ENCOUNTER (OUTPATIENT)
Dept: CARDIOLOGY | Facility: CLINIC | Age: 82
Discharge: HOME | End: 2017-07-07
Attending: INTERNAL MEDICINE | Admitting: INTERNAL MEDICINE
Payer: MEDICARE

## 2017-07-07 DIAGNOSIS — I10 ESSENTIAL HYPERTENSION: ICD-10-CM

## 2017-07-07 DIAGNOSIS — R42 VERTIGO: ICD-10-CM

## 2017-07-07 DIAGNOSIS — I95.1 ORTHOSTASIS: ICD-10-CM

## 2017-07-07 PROCEDURE — 93306 TTE W/DOPPLER COMPLETE: CPT

## 2017-07-07 PROCEDURE — 93306 TTE W/DOPPLER COMPLETE: CPT | Mod: 26 | Performed by: INTERNAL MEDICINE

## 2017-07-28 DIAGNOSIS — N40.0 BENIGN PROSTATIC HYPERPLASIA: ICD-10-CM

## 2017-07-28 RX ORDER — FINASTERIDE 5 MG/1
TABLET, FILM COATED ORAL
Qty: 90 TABLET | Refills: 3 | Status: SHIPPED | OUTPATIENT
Start: 2017-07-28 | End: 2018-07-16

## 2017-07-28 NOTE — TELEPHONE ENCOUNTER
Proscar 5mg         Last Written Prescription Date: 7/7/16  Last Fill Quantity: 90, # refills: 3    Last Office Visit with G, [facility] or [facility] prescribing provider:  6/19/17   Future Office Visit:    Next 5 appointments (look out 90 days)     Aug 04, 2017  9:30 AM CDT   Return Visit with Jasvir Cartwright, AMARA RUSSELL, [employer]   [facility] ([facility])    [address] [place] [address] [phone]                  BP Readings from Last 3 Encounters:   06/19/17 140/60   05/01/17 136/60   10/28/16 148/80

## 2017-07-28 NOTE — TELEPHONE ENCOUNTER
Prescription approved per [facility] Refill Protocol.    Lo Zhang, MSN, RN-BC  Care Coordinator

## 2017-07-31 DIAGNOSIS — I10 ESSENTIAL HYPERTENSION: ICD-10-CM

## 2017-07-31 DIAGNOSIS — I25.10 CORONARY ARTERY DISEASE INVOLVING NATIVE CORONARY ARTERY OF NATIVE HEART WITHOUT ANGINA PECTORIS: ICD-10-CM

## 2017-07-31 DIAGNOSIS — I25.10 ASCVD (ARTERIOSCLEROTIC CARDIOVASCULAR DISEASE): ICD-10-CM

## 2017-07-31 DIAGNOSIS — I10 ESSENTIAL HYPERTENSION WITH GOAL BLOOD PRESSURE LESS THAN 140/90: ICD-10-CM

## 2017-07-31 RX ORDER — LISINOPRIL 10 MG/1
10 TABLET ORAL DAILY
Qty: 90 TABLET | Refills: 1 | Status: SHIPPED | OUTPATIENT
Start: 2017-07-31 | End: 2017-12-01

## 2017-07-31 RX ORDER — AMLODIPINE BESYLATE 5 MG/1
5 TABLET ORAL DAILY
Qty: 90 TABLET | Refills: 1 | Status: SHIPPED | OUTPATIENT
Start: 2017-07-31 | End: 2017-12-01

## 2017-07-31 RX ORDER — METOPROLOL SUCCINATE 25 MG/1
12.5 TABLET, EXTENDED RELEASE ORAL DAILY
Qty: 45 TABLET | Refills: 1 | Status: SHIPPED | OUTPATIENT
Start: 2017-07-31 | End: 2017-12-01

## 2017-08-04 ENCOUNTER — OFFICE VISIT (OUTPATIENT)
Dept: CARDIOLOGY | Facility: CLINIC | Age: 82
End: 2017-08-04
Payer: COMMERCIAL

## 2017-08-04 VITALS
DIASTOLIC BLOOD PRESSURE: 66 MMHG | BODY MASS INDEX: 21.53 KG/M2 | HEART RATE: 58 BPM | WEIGHT: 134 LBS | SYSTOLIC BLOOD PRESSURE: 120 MMHG | HEIGHT: 66 IN

## 2017-08-04 DIAGNOSIS — E78.00 PURE HYPERCHOLESTEROLEMIA: ICD-10-CM

## 2017-08-04 DIAGNOSIS — I25.10 CORONARY ARTERY DISEASE INVOLVING NATIVE CORONARY ARTERY OF NATIVE HEART WITHOUT ANGINA PECTORIS: ICD-10-CM

## 2017-08-04 DIAGNOSIS — R42 DIZZINESS: Primary | ICD-10-CM

## 2017-08-04 LAB
CHOLEST SERPL-MCNC: 114 MG/DL
HDLC SERPL-MCNC: 53 MG/DL
LDLC SERPL CALC-MCNC: 50 MG/DL
NONHDLC SERPL-MCNC: 61 MG/DL
TRIGL SERPL-MCNC: 57 MG/DL

## 2017-08-04 PROCEDURE — 80061 LIPID PANEL: CPT | Performed by: INTERNAL MEDICINE

## 2017-08-04 PROCEDURE — 99214 OFFICE O/P EST MOD 30 MIN: CPT | Performed by: NURSE PRACTITIONER

## 2017-08-04 PROCEDURE — 36415 COLL VENOUS BLD VENIPUNCTURE: CPT | Performed by: INTERNAL MEDICINE

## 2017-08-04 RX ORDER — ATORVASTATIN CALCIUM 40 MG/1
40 TABLET, FILM COATED ORAL DAILY
Qty: 90 TABLET | Refills: 1 | COMMUNITY
Start: 2017-08-04 | End: 2019-09-16 | Stop reason: ALTCHOICE

## 2017-08-04 NOTE — LETTER
8/4/2017    Kale Thompson MD  [facility]   [address] [place] [address]    RE: Jessica Mckeon       Dear Colleague,    I had the pleasure of seeing Jessica Mkceon in the [facility].    History of Present Illness:  Jessica Mckeon is a 87 year old male followed here by Dr. Kulkarni. He returns today for follow-up to review a lipid profile. He was not fasting but the labs were drawn anyway look reasonably controlled with an LDL of 50 HDL 53 total cholesterol 114 and triglycerides 57. There was no statin on his medication list today but after clarifying through the , he continues to take atorvastatin 40 mg per day which he has historically been taking at previous office visits here.    He has a history of coronary artery disease with stenting to his circumflex in 2011. Follow-up nuclear stress testing in May 2016 was unremarkable for ischemia.    At the last office visit with Dr. Patric Kulkarni, a carotid bruit was noted. Carotid ultrasound was performed and showed less than 50% stenosis bilaterally.    He has been seen most recently by neurology (per his report) and his primary care physician for dizziness which seems to have an orthostatic component. He's had no syncope or presyncope. Based on this finding in echocardiogram was performed showing a sigmoid septum with no evidence of left ventricular outflow obstruction. LV function is intact as is RV function. Mild pulmonary hypertension is present. There is aortic sclerosis without stenosis. It's possible his carotid murmur could be radiating from this location. He had evidence of a dilated ascending aorta. In 2016, he had an MR scan showing the mid ascending aorta measured 3.8 x 3.8 cm. Proximal aortic arch 38 mm x 37 mm, currently the ascending aorta measures 3.7 representing stability in the last year without change in size.    He reports seeing a neurologist who diagnosed him with  "sleep apnea but he did not obtain a CPAP mask. He also states that he's been \"jumping in his sleep\" and has been diagnosed with early Alzheimer's. He apparently was given a medication that he did not tolerate for the symptoms and has discontinued it due to the fact that it caused worsening of nightmares. He has not been in touch with his neurologist to report that he stopped the medication or nor did he obtain the CPAP mask. I've encouraged him to do so.    From a cardiac perspective he denies any chest pain or shortness of breath. I did check for orthostatic blood pressure drop and did not obtain one. His primary care physician has reduced his metoprolol back from 25 mg once a day to 12.5 mg once per day due to dizziness without change so far.    Previous Holter monitoring done in 2011 showed a relatively high PVC burden of 6500 beats in a 24-hour period. There was no significant bradycardia. I will repeat a Holter monitor to see if there are any arrhythmogenic causes for his current dizzy symptoms.    Exam is outlined below.    Impression/Plan:   1. Coronary artery disease with previous circumflex stenting 2011 no ischemic symptoms with preserved LV function  2. Aortic sclerosis  3. Dilated ascending aorta stable  4. Dyslipidemia treated to goal. Continue atorvastatin 40 mg per day  5. Dizzy spells that seemed to have an orthostatic component by history. Worse when standing and worse when in a hot shower. Metoprolol dose has been reduced. I will perform a Holter monitor for 48 hours to look for increased PVC burden, ventricular tachycardia, or other arrhythmogenic causes for his dizziness. I did not reduce his other antihypertensives currently as his blood pressure is reasonably controlled.  6. Reportedly he has been diagnosed with early Alzheimer's and sleep apnea. Reportedly a new medication was tried and CPAP mask was never placed--reasons a bit unclear.. I referred him back to neurology.  6. Hypertension " controlled  A pleasure seeing Mr. Zimmer  in follow-up. I have scheduled a visit in the next 2 weeks to review his Holter monitor with myself or Dr. Kulkarni, whomever has availability.    Greater than 50% of this 30 minute visit was spent in counseling.          Jasvir Cartwright, MSN, APRN-BC, CNP  Cardiology    Orders Placed This Encounter   Procedures     Follow-Up with Cardiac Advanced Practice Provider     Holter Monitor 48 hour - Adult     Orders Placed This Encounter   Medications     atorvastatin (LIPITOR) 40 MG tablet     Sig: Take 1 tablet (40 mg) by mouth daily     Dispense:  90 tablet     Refill:  1     Medications Discontinued During This Encounter   Medication Reason     MAGNESIUM PO Stopped by Patient     omega 3 1000 MG CAPS Stopped by Patient     traMADol (ULTRAM) 50 MG tablet Stopped by Patient     chlordiazePOXIDE (LIBRIUM) 5 MG capsule Stopped by Patient     CLONAZEPAM PO Stopped by Patient     DONEPEZIL HCL PO Stopped by Patient     hydrocortisone (ANUSOL-HC) 25 MG suppository Stopped by Patient     rosuvastatin (CRESTOR) 20 MG tablet Stopped by Patient     RABEprazole (ACIPHEX) 20 MG EC tablet Stopped by Patient         Encounter Diagnoses   Name Primary?     Dizziness Yes     Pure hypercholesterolemia        CURRENT MEDICATIONS:  Current Outpatient Prescriptions   Medication Sig Dispense Refill     atorvastatin (LIPITOR) 40 MG tablet Take 1 tablet (40 mg) by mouth daily 90 tablet 1     lisinopril (PRINIVIL/ZESTRIL) 10 MG tablet Take 1 tablet (10 mg) by mouth daily 90 tablet 1     metoprolol (TOPROL-XL) 25 MG 24 hr tablet Take 0.5 tablets (12.5 mg) by mouth daily 45 tablet 1     amLODIPine (NORVASC) 5 MG tablet Take 1 tablet (5 mg) by mouth daily 90 tablet 1     finasteride (PROSCAR) 5 MG tablet TAKE ONE TABLET BY MOUTH ONCE DAILY 90 tablet 3     alfuzosin (UROXATRAL) 10 MG 24 hr tablet Take 1 tablet (10 mg) by mouth daily 90 tablet 3     fluticasone (FLONASE) 50 MCG/ACT nasal spray  Spray 1-2 sprays into both nostrils daily 16 g 3     LANSOPRAZOLE PO Take 30 mg by mouth daily       Cholecalciferol (VITAMIN D) 1000 UNITS capsule Take 1 capsule by mouth daily Reported on 5/1/2017       Calcium Carb-Cholecalciferol (CALCIUM 600 + D PO)        aspirin 81 MG tablet Take 1 tablet (81 mg) by mouth daily 30 tablet      sennosides (SENNA) 8.6 MG tablet Take 1 tablet by mouth as needed  120 each      order for DME Jobst Stockings. Knee high. Medium compression (20-30 mmHg). Dispense 2 pairs. 2 each 5       ALLERGIES   No Known Allergies    PAST MEDICAL HISTORY:  Past Medical History:   Diagnosis Date     Anxiety      Arthritis     mild in neck     Benign neoplasm of colon 12/2007    colonic polyps     BPPV (benign paroxysmal positional vertigo) 9/16/2008     Coronary artery disease     3/11: PTCA & JACK to prox Cfx     Gastro-oesophageal reflux disease      Heart attack (H) 4/29/2016     Hyperlipidemia      Near syncope      Palpitations      Syncope      Unspecified essential hypertension      Urinary sys symptom NEC     BPH sx     Vertigo        PAST SURGICAL HISTORY:  Past Surgical History:   Procedure Laterality Date     BACK SURGERY  1993     CATARACT IOL, RT/LT  4/14/10    Right     CORONARY ANGIOGRAPHY ADULT ORDER  3/22/11     HEART CATH, ANGIOPLASTY  3/22/11    PTCA & JACK to prox Cfx     HYDROCELECTOMY INGUINAL  9/4/2012    Procedure: HYDROCELECTOMY INGUINAL;  Left Hydrocelectomy;  Surgeon: Wili Yan MD;  Location: [facility] OR       FAMILY HISTORY:  Family History   Problem Relation Age of Onset     DIABETES Mother      HEART DISEASE Father 97       SOCIAL HISTORY:  Social History     Social History     Marital status:      Spouse name: N/A     Number of children: N/A     Years of education: N/A     Social History Main Topics     Smoking status: Never Smoker     Smokeless tobacco: Never Used     Alcohol use No     Drug use: No     Sexual activity: Not Currently     Other Topics Concern      "Caffeine Concern Yes     green tea     Sleep Concern Yes     night munoz      Stress Concern Yes     worries about every thing     Special Diet No     watching sodium intake     Exercise Yes     walking 20 minutes daily     Social History Narrative       Review of Systems:  Skin:  Negative lumps or bumps   Eyes:  Positive for glasses  ENT:  Positive for hearing loss;sinus trouble  Respiratory:  Negative dyspnea on exertion;cough  Cardiovascular:  Negative Positive for;chest pain;dizziness;edema;fatigue  Gastroenterology: Positive for reflux  Genitourinary:  Positive for nocturia  Musculoskeletal:  Positive for nocturnal cramping  Neurologic:  Positive for numbness or tingling of hands  Psychiatric:  Positive for anxiety;sleep disturbances  Heme/Lymph/Imm:  Negative    Endocrine:  Positive for      Physical Exam:  Vitals: /66  Pulse 58  Ht 1.676 m (5' 5.98\")  Wt 60.8 kg (134 lb)  BMI 21.64 kg/m2    Constitutional:  cooperative, alert and oriented, well developed, well nourished, in no acute distress        Skin:  warm and dry to the touch, no apparent skin lesions or masses noted        Head:  normocephalic, no masses or lesions        Eyes:  pupils equal and round, conjunctivae and lids unremarkable, sclera white, no xanthalasma, EOMS intact, no nystagmus        ENT:  no pallor or cyanosis, dentition good;no pallor or cyanosis hearing aide(s) present      Neck:  carotid pulses are full and equal bilaterally, JVP normal, no carotid bruit, no thyromegaly    (Right carotid bruit.)    Chest:  normal breath sounds, clear to auscultation, normal A-P diameter, normal symmetry, normal respiratory excursion, no use of accessory muscles        Cardiac: regular rhythm;normal S1 and S2;apical impulse not displaced       systolic ejection murmur;RUSB;grade 2;radiation to the carotid          Abdomen:  abdomen soft, non-tender, BS normoactive, no mass, no HSM, no bruits        Vascular: pulses full and equal, no bruits " auscultated                                      Extremities and Back:  no deformities, clubbing, cyanosis, erythema observed;no edema        Neurological:  affect appropriate, oriented to time, person and place;no gross motor deficits          Recent Lab Results:  LIPID RESULTS:  Lab Results   Component Value Date    CHOL 114 08/04/2017    HDL 53 08/04/2017    LDL 50 08/04/2017    TRIG 57 08/04/2017    CHOLHDLRATIO 2.9 06/05/2015       LIVER ENZYME RESULTS:  Lab Results   Component Value Date    AST 26 06/19/2017    ALT 17 06/19/2017       CBC RESULTS:  Lab Results   Component Value Date    WBC 6.0 06/19/2017    RBC 4.19 (L) 06/19/2017    HGB 11.7 (L) 06/19/2017    HCT 35.8 (L) 06/19/2017    MCV 85 06/19/2017    MCH 27.9 06/19/2017    MCHC 32.7 06/19/2017    RDW 13.7 06/19/2017     06/19/2017       BMP RESULTS:  Lab Results   Component Value Date     06/19/2017    POTASSIUM 4.0 06/19/2017    CHLORIDE 105 06/19/2017    CO2 29 06/19/2017    ANIONGAP 5 06/19/2017    GLC 98 06/19/2017    BUN 28 06/19/2017    CR 1.17 06/19/2017    GFRESTIMATED 59 (L) 06/19/2017    GFRESTBLACK 71 06/19/2017    SADE 8.5 06/19/2017        A1C RESULTS:  Lab Results   Component Value Date    A1C 5.9 02/28/2007       INR RESULTS:  Lab Results   Component Value Date    INR 1.01 12/21/2011    INR 0.99 03/22/2011     Thank you for allowing me to participate in the care of your patient.    Sincerely,     AMARA [facility]

## 2017-08-04 NOTE — MR AVS SNAPSHOT
"              After Visit Summary   8/4/2017    Jessica Mckeon    MRN: [id number]           Patient Information     Date Of Birth          10/26/1929        Visit Information        Provider Department      8/4/2017 9:30 AM Jasvir Cartwright, AMARA CNP; [place] [employer] [facility] [place]        Today's Diagnoses     Dizziness    -  1      Care Instructions    Schedule a heart monitor    See us back in follow up to review the results          Follow-ups after your visit        Additional Services     Follow-Up with Cardiac Advanced Practice Provider                 Future tests that were ordered for you today     Open Future Orders        Priority Expected Expires Ordered    Follow-Up with Cardiac Advanced Practice Provider Routine 8/29/2017 8/4/2018 8/4/2017    Holter Monitor 48 hour - Adult Routine 8/9/2017 9/18/2017 8/4/2017            Who to contact     If you have questions or need follow up information about today's clinic visit or your schedule please contact [facility] [place] directly at [phone].  Normal or non-critical lab and imaging results will be communicated to you by [employer], letter or phone within 4 business days after the clinic has received the results. If you do not hear from us within 7 days, please contact the clinic through [employer] or phone. If you have a critical or abnormal lab result, we will notify you by phone as soon as possible.  Submit refill requests through [employer] or call your pharmacy and they will forward the refill request to us. Please allow 3 business days for your refill to be completed.          Additional Information About Your Visit        [employer] Information     [employer] lets you send messages to your doctor, view your test results, renew your prescriptions, schedule appointments and more. To sign up, go to [email or web address] . Click on \"Log in\" on the left side of the screen, " "which will take you to the Welcome page. Then click on \"Sign up Now\" on the right side of the page.     You will be asked to enter the access code listed below, as well as some personal information. Please follow the directions to create your username and password.     Your access code is: [email or web address]  Expires: 2017 10:11 AM     Your access code will  in 90 days. If you need help or a new code, please call your [facility] clinic or [phone].        Care EveryWhere ID     This is your Care EveryWhere ID. This could be used by other organizations to access your [facility] medical records  [id number]        Your Vitals Were     Pulse Height BMI (Body Mass Index)             58 1.676 m (5' 5.98\") 21.64 kg/m2          Blood Pressure from Last 3 Encounters:   17 120/66   17 140/60   17 136/60    Weight from Last 3 Encounters:   17 60.8 kg (134 lb)   17 61.6 kg (135 lb 11.2 oz)   17 61.6 kg (135 lb 12.8 oz)               Primary Care Provider Office Phone # Fax #    Kale Thompson [phone]       [facility] [address] [place] [address] [address] [place] [address]        Equal Access to Services     GILDA SRIVASTAVA AH: Hadii courtney ku hadasho Soomaali, waaxda luqadaha, qaybta kaalmada ye, dana cordova. So [facility] [phone].    ATENCIÓN: Si habla español, tiene a hylton disposición servicios gratuitos de asistencia lingüística. Cecy al [phone].    We comply with applicable federal civil rights laws and [place] laws. We do not discriminate on the basis of race, color, national origin, age, disability sex, sexual orientation or gender identity.            Thank you!     Thank you for choosing [facility] [place]  for your care. Our goal is always to provide you with excellent care. Hearing back from our patients is one way we can continue to improve our services. Please take a few minutes to " complete the written survey that you may receive in the mail after your visit with us. Thank you!             Your Updated Medication List - Protect others around you: Learn how to safely use, store and throw away your medicines at www.disposemymeds.org.          This list is accurate as of: 8/4/17 10:11 AM.  Always use your most recent med list.                   Brand Name Dispense Instructions for use Diagnosis    alfuzosin 10 MG 24 hr tablet    UROXATRAL    90 tablet    Take 1 tablet (10 mg) by mouth daily    Essential hypertension with goal blood pressure less than 140/90       amLODIPine 5 MG tablet    NORVASC    90 tablet    Take 1 tablet (5 mg) by mouth daily    Coronary artery disease involving native coronary artery of native heart without angina pectoris, Essential hypertension with goal blood pressure less than 140/90       aspirin 81 MG tablet     30 tablet    Take 1 tablet (81 mg) by mouth daily        CALCIUM 600 + D PO           finasteride 5 MG tablet    PROSCAR    90 tablet    TAKE ONE TABLET BY MOUTH ONCE DAILY    Benign prostatic hyperplasia       fluticasone 50 MCG/ACT spray    FLONASE    16 g    Spray 1-2 sprays into both nostrils daily    Chronic rhinitis       LANSOPRAZOLE PO      Take 30 mg by mouth daily        lisinopril 10 MG tablet    PRINIVIL/ZESTRIL    90 tablet    Take 1 tablet (10 mg) by mouth daily    Coronary artery disease involving native coronary artery of native heart without angina pectoris, Essential hypertension with goal blood pressure less than 140/90       metoprolol 25 MG 24 hr tablet    TOPROL-XL    45 tablet    Take 0.5 tablets (12.5 mg) by mouth daily    ASCVD (arteriosclerotic cardiovascular disease), Essential hypertension       order for DME     2 each    Jobst Stockings. Knee high. Medium compression (20-30 mmHg). Dispense 2 pairs.    Coronary artery disease involving native coronary artery of native heart without angina pectoris, Bilateral edema of lower  extremity       sennosides 8.6 MG tablet    SENOKOT    120 each    Take 1 tablet by mouth as needed        vitamin D 1000 UNITS capsule      Take 1 capsule by mouth daily Reported on 5/1/2017

## 2017-08-04 NOTE — PROGRESS NOTES
"History of Present Illness:  Jessica Mckeon is a 87 year old male followed here by Dr. Kulkarni. He returns today for follow-up to review a lipid profile. He was not fasting but the labs were drawn anyway look reasonably controlled with an LDL of 50 HDL 53 total cholesterol 114 and triglycerides 57. There was no statin on his medication list today but after clarifying through the , he continues to take atorvastatin 40 mg per day which he has historically been taking at previous office visits here.    He has a history of coronary artery disease with stenting to his circumflex in 2011. Follow-up nuclear stress testing in May 2016 was unremarkable for ischemia.    At the last office visit with Dr. Patric Kulkarni, a carotid bruit was noted. Carotid ultrasound was performed and showed less than 50% stenosis bilaterally.    He has been seen most recently by neurology (per his report) and his primary care physician for dizziness which seems to have an orthostatic component. He's had no syncope or presyncope. Based on this finding in echocardiogram was performed showing a sigmoid septum with no evidence of left ventricular outflow obstruction. LV function is intact as is RV function. Mild pulmonary hypertension is present. There is aortic sclerosis without stenosis. It's possible his carotid murmur could be radiating from this location. He had evidence of a dilated ascending aorta. In 2016, he had an MR scan showing the mid ascending aorta measured 3.8 x 3.8 cm. Proximal aortic arch 38 mm x 37 mm, currently the ascending aorta measures 3.7 representing stability in the last year without change in size.    He reports seeing a neurologist who diagnosed him with sleep apnea but he did not obtain a CPAP mask. He also states that he's been \"jumping in his sleep\" and has been diagnosed with early Alzheimer's. He apparently was given a medication that he did not tolerate for the symptoms and has discontinued it " due to the fact that it caused worsening of nightmares. He has not been in touch with his neurologist to report that he stopped the medication or nor did he obtain the CPAP mask. I've encouraged him to do so.    From a cardiac perspective he denies any chest pain or shortness of breath. I did check for orthostatic blood pressure drop and did not obtain one. His primary care physician has reduced his metoprolol back from 25 mg once a day to 12.5 mg once per day due to dizziness without change so far.    Previous Holter monitoring done in 2011 showed a relatively high PVC burden of 6500 beats in a 24-hour period. There was no significant bradycardia. I will repeat a Holter monitor to see if there are any arrhythmogenic causes for his current dizzy symptoms.    Exam is outlined below.    Impression/Plan:   1. Coronary artery disease with previous circumflex stenting 2011 no ischemic symptoms with preserved LV function  2. Aortic sclerosis  3. Dilated ascending aorta stable  4. Dyslipidemia treated to goal. Continue atorvastatin 40 mg per day  5. Dizzy spells that seemed to have an orthostatic component by history. Worse when standing and worse when in a hot shower. Metoprolol dose has been reduced. I will perform a Holter monitor for 48 hours to look for increased PVC burden, ventricular tachycardia, or other arrhythmogenic causes for his dizziness. I did not reduce his other antihypertensives currently as his blood pressure is reasonably controlled.  6. Reportedly he has been diagnosed with early Alzheimer's and sleep apnea. Reportedly a new medication was tried and CPAP mask was never placed--reasons a bit unclear.. I referred him back to neurology.  6. Hypertension controlled  A pleasure seeing Mr. Zimmer  in follow-up. I have scheduled a visit in the next 2 weeks to review his Holter monitor with myself or Dr. Kulkarni, whomever has availability.    Greater than 50% of this 30 minute visit was spent in  counseling.          Jasvir Cartwright, MSN, APRN-BC, CNP  Cardiology    Orders Placed This Encounter   Procedures     Follow-Up with Cardiac Advanced Practice Provider     Holter Monitor 48 hour - Adult     Orders Placed This Encounter   Medications     atorvastatin (LIPITOR) 40 MG tablet     Sig: Take 1 tablet (40 mg) by mouth daily     Dispense:  90 tablet     Refill:  1     Medications Discontinued During This Encounter   Medication Reason     MAGNESIUM PO Stopped by Patient     omega 3 1000 MG CAPS Stopped by Patient     traMADol (ULTRAM) 50 MG tablet Stopped by Patient     chlordiazePOXIDE (LIBRIUM) 5 MG capsule Stopped by Patient     CLONAZEPAM PO Stopped by Patient     DONEPEZIL HCL PO Stopped by Patient     hydrocortisone (ANUSOL-HC) 25 MG suppository Stopped by Patient     rosuvastatin (CRESTOR) 20 MG tablet Stopped by Patient     RABEprazole (ACIPHEX) 20 MG EC tablet Stopped by Patient         Encounter Diagnoses   Name Primary?     Dizziness Yes     Pure hypercholesterolemia        CURRENT MEDICATIONS:  Current Outpatient Prescriptions   Medication Sig Dispense Refill     atorvastatin (LIPITOR) 40 MG tablet Take 1 tablet (40 mg) by mouth daily 90 tablet 1     lisinopril (PRINIVIL/ZESTRIL) 10 MG tablet Take 1 tablet (10 mg) by mouth daily 90 tablet 1     metoprolol (TOPROL-XL) 25 MG 24 hr tablet Take 0.5 tablets (12.5 mg) by mouth daily 45 tablet 1     amLODIPine (NORVASC) 5 MG tablet Take 1 tablet (5 mg) by mouth daily 90 tablet 1     finasteride (PROSCAR) 5 MG tablet TAKE ONE TABLET BY MOUTH ONCE DAILY 90 tablet 3     alfuzosin (UROXATRAL) 10 MG 24 hr tablet Take 1 tablet (10 mg) by mouth daily 90 tablet 3     fluticasone (FLONASE) 50 MCG/ACT nasal spray Spray 1-2 sprays into both nostrils daily 16 g 3     LANSOPRAZOLE PO Take 30 mg by mouth daily       Cholecalciferol (VITAMIN D) 1000 UNITS capsule Take 1 capsule by mouth daily Reported on 5/1/2017       Calcium Carb-Cholecalciferol (CALCIUM 600 + D  PO)        aspirin 81 MG tablet Take 1 tablet (81 mg) by mouth daily 30 tablet      sennosides (SENNA) 8.6 MG tablet Take 1 tablet by mouth as needed  120 each      order for DME Jobst Stockings. Knee high. Medium compression (20-30 mmHg). Dispense 2 pairs. 2 each 5       ALLERGIES   No Known Allergies    PAST MEDICAL HISTORY:  Past Medical History:   Diagnosis Date     Anxiety      Arthritis     mild in neck     Benign neoplasm of colon 12/2007    colonic polyps     BPPV (benign paroxysmal positional vertigo) 9/16/2008     Coronary artery disease     3/11: PTCA & JACK to prox Cfx     Gastro-oesophageal reflux disease      Heart attack (H) 4/29/2016     Hyperlipidemia      Near syncope      Palpitations      Syncope      Unspecified essential hypertension      Urinary sys symptom NEC     BPH sx     Vertigo        PAST SURGICAL HISTORY:  Past Surgical History:   Procedure Laterality Date     BACK SURGERY  1993     CATARACT IOL, RT/LT  4/14/10    Right     CORONARY ANGIOGRAPHY ADULT ORDER  3/22/11     HEART CATH, ANGIOPLASTY  3/22/11    PTCA & JACK to prox Cfx     HYDROCELECTOMY INGUINAL  9/4/2012    Procedure: HYDROCELECTOMY INGUINAL;  Left Hydrocelectomy;  Surgeon: Wili Yan MD;  Location: [facility] OR       FAMILY HISTORY:  Family History   Problem Relation Age of Onset     DIABETES Mother      HEART DISEASE Father 97       SOCIAL HISTORY:  Social History     Social History     Marital status:      Spouse name: N/A     Number of children: N/A     Years of education: N/A     Social History Main Topics     Smoking status: Never Smoker     Smokeless tobacco: Never Used     Alcohol use No     Drug use: No     Sexual activity: Not Currently     Other Topics Concern     Caffeine Concern Yes     green tea     Sleep Concern Yes     night munoz      Stress Concern Yes     worries about every thing     Special Diet No     watching sodium intake     Exercise Yes     walking 20 minutes daily     Social History Narrative  "      Review of Systems:  Skin:  Negative lumps or bumps   Eyes:  Positive for glasses  ENT:  Positive for hearing loss;sinus trouble  Respiratory:  Negative dyspnea on exertion;cough  Cardiovascular:  Negative Positive for;chest pain;dizziness;edema;fatigue  Gastroenterology: Positive for reflux  Genitourinary:  Positive for nocturia  Musculoskeletal:  Positive for nocturnal cramping  Neurologic:  Positive for numbness or tingling of hands  Psychiatric:  Positive for anxiety;sleep disturbances  Heme/Lymph/Imm:  Negative    Endocrine:  Positive for      Physical Exam:  Vitals: /66  Pulse 58  Ht 1.676 m (5' 5.98\")  Wt 60.8 kg (134 lb)  BMI 21.64 kg/m2    Constitutional:  cooperative, alert and oriented, well developed, well nourished, in no acute distress        Skin:  warm and dry to the touch, no apparent skin lesions or masses noted        Head:  normocephalic, no masses or lesions        Eyes:  pupils equal and round, conjunctivae and lids unremarkable, sclera white, no xanthalasma, EOMS intact, no nystagmus        ENT:  no pallor or cyanosis, dentition good;no pallor or cyanosis hearing aide(s) present      Neck:  carotid pulses are full and equal bilaterally, JVP normal, no carotid bruit, no thyromegaly    (Right carotid bruit.)    Chest:  normal breath sounds, clear to auscultation, normal A-P diameter, normal symmetry, normal respiratory excursion, no use of accessory muscles        Cardiac: regular rhythm;normal S1 and S2;apical impulse not displaced       systolic ejection murmur;RUSB;grade 2;radiation to the carotid          Abdomen:  abdomen soft, non-tender, BS normoactive, no mass, no HSM, no bruits        Vascular: pulses full and equal, no bruits auscultated                                      Extremities and Back:  no deformities, clubbing, cyanosis, erythema observed;no edema        Neurological:  affect appropriate, oriented to time, person and place;no gross motor deficits    "       Recent Lab Results:  LIPID RESULTS:  Lab Results   Component Value Date    CHOL 114 08/04/2017    HDL 53 08/04/2017    LDL 50 08/04/2017    TRIG 57 08/04/2017    CHOLHDLRATIO 2.9 06/05/2015       LIVER ENZYME RESULTS:  Lab Results   Component Value Date    AST 26 06/19/2017    ALT 17 06/19/2017       CBC RESULTS:  Lab Results   Component Value Date    WBC 6.0 06/19/2017    RBC 4.19 (L) 06/19/2017    HGB 11.7 (L) 06/19/2017    HCT 35.8 (L) 06/19/2017    MCV 85 06/19/2017    MCH 27.9 06/19/2017    MCHC 32.7 06/19/2017    RDW 13.7 06/19/2017     06/19/2017       BMP RESULTS:  Lab Results   Component Value Date     06/19/2017    POTASSIUM 4.0 06/19/2017    CHLORIDE 105 06/19/2017    CO2 29 06/19/2017    ANIONGAP 5 06/19/2017    GLC 98 06/19/2017    BUN 28 06/19/2017    CR 1.17 06/19/2017    GFRESTIMATED 59 (L) 06/19/2017    GFRESTBLACK 71 06/19/2017    SADE 8.5 06/19/2017        A1C RESULTS:  Lab Results   Component Value Date    A1C 5.9 02/28/2007       INR RESULTS:  Lab Results   Component Value Date    INR 1.01 12/21/2011    INR 0.99 03/22/2011           CC  No referring provider defined for this encounter.

## 2017-08-09 ENCOUNTER — HOSPITAL ENCOUNTER (OUTPATIENT)
Dept: CARDIOLOGY | Facility: CLINIC | Age: 82
Discharge: HOME | End: 2017-08-09
Attending: NURSE PRACTITIONER | Admitting: NURSE PRACTITIONER
Payer: MEDICARE

## 2017-08-09 DIAGNOSIS — R42 DIZZINESS: ICD-10-CM

## 2017-08-09 PROCEDURE — 93227 XTRNL ECG REC<48 HR R&I: CPT | Performed by: INTERNAL MEDICINE

## 2017-08-09 PROCEDURE — 93226 XTRNL ECG REC<48 HR SCAN A/R: CPT

## 2017-08-11 ENCOUNTER — PRE VISIT (OUTPATIENT)
Dept: CARDIOLOGY | Facility: CLINIC | Age: 82
End: 2017-08-11

## 2017-08-15 ENCOUNTER — OFFICE VISIT (OUTPATIENT)
Dept: CARDIOLOGY | Facility: CLINIC | Age: 82
End: 2017-08-15
Attending: NURSE PRACTITIONER
Payer: COMMERCIAL

## 2017-08-15 VITALS
SYSTOLIC BLOOD PRESSURE: 140 MMHG | BODY MASS INDEX: 21.6 KG/M2 | HEART RATE: 60 BPM | WEIGHT: 134.4 LBS | HEIGHT: 66 IN | DIASTOLIC BLOOD PRESSURE: 72 MMHG

## 2017-08-15 DIAGNOSIS — I95.1 ORTHOSTASIS: ICD-10-CM

## 2017-08-15 DIAGNOSIS — I25.10 ASCVD (ARTERIOSCLEROTIC CARDIOVASCULAR DISEASE): Primary | ICD-10-CM

## 2017-08-15 DIAGNOSIS — I10 ESSENTIAL HYPERTENSION: ICD-10-CM

## 2017-08-15 DIAGNOSIS — R42 DIZZINESS: ICD-10-CM

## 2017-08-15 DIAGNOSIS — I25.10 CORONARY ARTERY DISEASE INVOLVING NATIVE CORONARY ARTERY OF NATIVE HEART WITHOUT ANGINA PECTORIS: ICD-10-CM

## 2017-08-15 PROCEDURE — 99214 OFFICE O/P EST MOD 30 MIN: CPT | Performed by: INTERNAL MEDICINE

## 2017-08-15 NOTE — MR AVS SNAPSHOT
After Visit Summary   8/15/2017    Jessica Mckeon    MRN: [id number]           Patient Information     Date Of Birth          10/26/1929        Visit Information        Provider Department      8/15/2017 1:30 PM Patric Kulkarni MD; LANGUAGE B[facility]        Today's Diagnoses     ASCVD (arteriosclerotic cardiovascular disease)    -  1    Dizziness        Orthostasis        Coronary artery disease involving native coronary artery of native heart without angina pectoris        Essential hypertension           Follow-ups after your visit        Additional Services     Follow-Up with Cardiac Advanced Practice Provider           Follow-Up with Cardiologist                 Future tests that were ordered for you today     Open Future Orders        Priority Expected Expires Ordered    Lipid Profile Routine 8/10/2018 8/11/2018 8/15/2017    ALT Routine 8/10/2018 8/11/2018 8/15/2017    Follow-Up with Cardiologist Routine 8/15/2018 8/16/2018 8/15/2017    Lipid Profile Routine 2/11/2018 8/15/2018 8/15/2017    ALT Routine 2/11/2018 8/15/2018 8/15/2017    Follow-Up with Cardiac Advanced Practice Provider Routine 2/11/2018 8/15/2018 8/15/2017            Who to contact     If you have questions or need follow up information about today's clinic visit or your schedule please contact [facility] directly at [phone].  Normal or non-critical lab and imaging results will be communicated to you by MyChart, letter or phone within 4 business days after the clinic has received the results. If you do not hear from us within 7 days, please contact the clinic through MyChart or phone. If you have a critical or abnormal lab result, we will notify you by phone as soon as possible.  Submit refill requests through [facility] or call your pharmacy and they will forward the refill request to us. Please allow 3 business days for your  "refill to be completed.          Additional Information About Your Visit        [facility] [employer] Information     [employer] lets you send messages to your doctor, view your test results, renew your prescriptions, schedule appointments and more. To sign up, go to [email or web address] . Click on \"Log in\" on the left side of the screen, which will take you to the Welcome page. Then click on \"Sign up Now\" on the right side of the page.     You will be asked to enter the access code listed below, as well as some personal information. Please follow the directions to create your username and password.     Your access code is: [email or web address]  Expires: 2017 10:11 AM     Your access code will  in 90 days. If you need help or a new code, please call your [facility] clinic or [phone].        Care EveryWhere ID     This is your Care EveryWhere ID. This could be used by other organizations to access your [facility] medical records  [id number]        Your Vitals Were     Pulse Height BMI (Body Mass Index)             60 1.676 m (5' 6\") 21.69 kg/m2          Blood Pressure from Last 3 Encounters:   08/15/17 140/72   17 120/66   17 140/60    Weight from Last 3 Encounters:   08/15/17 61 kg (134 lb 6.4 oz)   17 60.8 kg (134 lb)   17 61.6 kg (135 lb 11.2 oz)              We Performed the Following     Follow-Up with Cardiac Advanced Practice Provider        Primary Care Provider Office Phone # Fax #    Kale Thompson [phone] [address] [place] [address]        Equal Access to Services     [facility] AH: Hadii courtney Reyes, wavirgieda luqadaha, qaybta kaalmadana laguerre. So [facility] [phone].    ATENCIÓN: Si habla español, tiene a hylton disposición servicios gratuitos de asistencia lingüística. Llame al [phone].    We comply with applicable federal civil rights laws and [place] laws. We do not discriminate on the " basis of race, color, national origin, age, disability sex, sexual orientation or gender identity.            Thank you!     Thank you for choosing [facility] [place]  for your care. Our goal is always to provide you with excellent care. Hearing back from our patients is one way we can continue to improve our services. Please take a few minutes to complete the written survey that you may receive in the mail after your visit with us. Thank you!             Your Updated Medication List - Protect others around you: Learn how to safely use, store and throw away your medicines at www.disposemymeds.org.          This list is accurate as of: 8/15/17  2:12 PM.  Always use your most recent med list.                   Brand Name Dispense Instructions for use Diagnosis    alfuzosin 10 MG 24 hr tablet    UROXATRAL    90 tablet    Take 1 tablet (10 mg) by mouth daily    Essential hypertension with goal blood pressure less than 140/90       amLODIPine 5 MG tablet    NORVASC    90 tablet    Take 1 tablet (5 mg) by mouth daily    Coronary artery disease involving native coronary artery of native heart without angina pectoris, Essential hypertension with goal blood pressure less than 140/90       aspirin 81 MG tablet     30 tablet    Take 1 tablet (81 mg) by mouth daily        CALCIUM 600 + D PO      With magnesium and zinc        finasteride 5 MG tablet    PROSCAR    90 tablet    TAKE ONE TABLET BY MOUTH ONCE DAILY    Benign prostatic hyperplasia       fluticasone 50 MCG/ACT spray    FLONASE    16 g    Spray 1-2 sprays into both nostrils daily    Chronic rhinitis       LANSOPRAZOLE PO      Take 30 mg by mouth daily        LIPITOR 40 MG tablet   Generic drug:  atorvastatin     90 tablet    Take 1 tablet (40 mg) by mouth daily    Pure hypercholesterolemia       lisinopril 10 MG tablet    PRINIVIL/ZESTRIL    90 tablet    Take 1 tablet (10 mg) by mouth daily    Coronary artery disease involving native  coronary artery of native heart without angina pectoris, Essential hypertension with goal blood pressure less than 140/90       metoprolol 25 MG 24 hr tablet    TOPROL-XL    45 tablet    Take 0.5 tablets (12.5 mg) by mouth daily    ASCVD (arteriosclerotic cardiovascular disease), Essential hypertension       order for DME     2 each    Jobst Stockings. Knee high. Medium compression (20-30 mmHg). Dispense 2 pairs.    Coronary artery disease involving native coronary artery of native heart without angina pectoris, Bilateral edema of lower extremity       sennosides 8.6 MG tablet    SENOKOT    120 each    Take 1 tablet by mouth as needed

## 2017-08-15 NOTE — LETTER
8/15/2017    Kale Thompson MD  [address] [place] [address]    RE: Jessica Mckeon       Dear Colleague,    I had the pleasure of seeing Jessica Mckeon in the [facility].    August 15, 2017     Kale Thompson MD    [facility]    [address]    [place]  [address]       RE: Jessica Mckeon   MRN: [id number]   : 10/26/1929      Dear Kale:      It is my pleasure to see your patient, Jessica Mckeon, who is a very pleasant 87-year-old gentleman with a history of hypertension and coronary artery disease.  If you remember, he had an angioplasty and stenting to his circumflex in . If you remember, he was complaining of dizziness and lightheadedness. The dose of metoprolol has been reduced so he is now on 12.5 mg off metoprolol succinate. Holter monitor was performed and reported on 2017 which was 4 days ago this showed that the average heart rate is 71 bpm the slowest heart rate was 44 bpm on the fastest was 118 bpm. There were no long pauses. He had frequent ventricular ectopic and less frequent supraventricular ectopic however none of these would cause dizziness or lightheadedness. The reduction in dose of metoprolol however does appear to have significantly improved the dizziness and lightheadedness. If you remember his symptoms where quite orthostatic sounding. He's lipid profile was excellent on  of this year with an LDL 50 HDL 53 triglycerides of 57 with a total cholesterol 114. His blood pressure is borderline today at 140/72. His pulse rate is 60 sinus today.      He does have carotid bruits on the right side but carotid ultrasound did not show flow limiting disease with less than 50% stenosis bilaterally on carotid ultrasound.    He has been complaining of some fatigue when trying to walk up stairs or exercise. I do not see any cardiac cause for this given his echocardiogram last month showed normal ejection fraction and no  significant valvular heart disease and given the fact that his nuclear stress test last year was normal.      IMPRESSION:   1.  Asymptomatic with respect to coronary artery disease.   2.  Hypertension.  Blood pressure is at the upper limits of normal.   3.  Right carotid bruit with no evidence of flow limiting lesions on ultrasound as described above  4. Holter monitor does not show any pathology that would cause dizziness or lightheadedness as described above. There has been a significant improvement in his dizziness on reducing the dose of his beta blocker medication.  5. Fatigue. The cause of this is unclear but is unlikely to be cardiac as I mentioned above    PLAN:   I will have the patient follow-up with Jasvir Jones NP in 6 months time with the lipid profile. He will follow-up with me in one year. As always he's been told to contact me if he's any questions or concerns.     Sincerely,            Patric Kulkarni MD, [facility]                    D: 10/26/2016 15:20   T: 10/26/2016 23:33   MT: SHELDON      Name:     TRAY BLAKE   MRN:      [id number]        Account:      [id number]   :      10/26/1929           Service Date: 08/15/2017     Document: [id number]      HPI and Plan:   See dictation    Orders Placed This Encounter   Procedures     Lipid Profile     ALT     Lipid Profile     ALT     Follow-Up with Cardiac Advanced Practice Provider     Follow-Up with Cardiologist       No orders of the defined types were placed in this encounter.      Medications Discontinued During This Encounter   Medication Reason     Cholecalciferol (VITAMIN D) 1000 UNITS capsule Stopped by Patient         Encounter Diagnoses   Name Primary?     Dizziness      ASCVD (arteriosclerotic cardiovascular disease) Yes     Orthostasis      Coronary artery disease involving native coronary artery of native heart without angina pectoris      Essential hypertension        CURRENT MEDICATIONS:  Current Outpatient Prescriptions    Medication Sig Dispense Refill     atorvastatin (LIPITOR) 40 MG tablet Take 1 tablet (40 mg) by mouth daily 90 tablet 1     lisinopril (PRINIVIL/ZESTRIL) 10 MG tablet Take 1 tablet (10 mg) by mouth daily 90 tablet 1     metoprolol (TOPROL-XL) 25 MG 24 hr tablet Take 0.5 tablets (12.5 mg) by mouth daily 45 tablet 1     amLODIPine (NORVASC) 5 MG tablet Take 1 tablet (5 mg) by mouth daily 90 tablet 1     finasteride (PROSCAR) 5 MG tablet TAKE ONE TABLET BY MOUTH ONCE DAILY 90 tablet 3     alfuzosin (UROXATRAL) 10 MG 24 hr tablet Take 1 tablet (10 mg) by mouth daily 90 tablet 3     fluticasone (FLONASE) 50 MCG/ACT nasal spray Spray 1-2 sprays into both nostrils daily 16 g 3     order for DME Jobst Stockings. Knee high. Medium compression (20-30 mmHg). Dispense 2 pairs. 2 each 5     LANSOPRAZOLE PO Take 30 mg by mouth daily       Calcium Carb-Cholecalciferol (CALCIUM 600 + D PO) With magnesium and zinc       aspirin 81 MG tablet Take 1 tablet (81 mg) by mouth daily 30 tablet      sennosides (SENNA) 8.6 MG tablet Take 1 tablet by mouth as needed  120 each        ALLERGIES   No Known Allergies    PAST MEDICAL HISTORY:  Past Medical History:   Diagnosis Date     Anxiety      Arthritis     mild in neck     Benign neoplasm of colon 12/2007    colonic polyps     BPPV (benign paroxysmal positional vertigo) 9/16/2008     Coronary artery disease     3/11: PTCA & JACK to prox Cfx     Gastro-oesophageal reflux disease      Heart attack (H) 4/29/2016     Hyperlipidemia      Near syncope      Palpitations      Syncope      Unspecified essential hypertension      Urinary sys symptom NEC     BPH sx     Vertigo        PAST SURGICAL HISTORY:  Past Surgical History:   Procedure Laterality Date     BACK SURGERY  1993     CATARACT IOL, RT/LT  4/14/10    Right     CORONARY ANGIOGRAPHY ADULT ORDER  3/22/11     HEART CATH, ANGIOPLASTY  3/22/11    PTCA & JACK to prox Cfx     HYDROCELECTOMY INGUINAL  9/4/2012    Procedure: HYDROCELECTOMY  "INGUINAL;  Left Hydrocelectomy;  Surgeon: Wili Yan MD;  Location: [facility] OR       FAMILY HISTORY:  Family History   Problem Relation Age of Onset     DIABETES Mother      HEART DISEASE Father 97       SOCIAL HISTORY:  Social History     Social History     Marital status:      Spouse name: N/A     Number of children: N/A     Years of education: N/A     Social History Main Topics     Smoking status: Never Smoker     Smokeless tobacco: Never Used     Alcohol use No     Drug use: No     Sexual activity: Not Currently     Other Topics Concern     Caffeine Concern Yes     green tea     Sleep Concern Yes     night munoz      Stress Concern Yes     worries about every thing     Special Diet No     watching sodium intake     Exercise Yes     walking 20 minutes daily     Social History Narrative       Review of Systems:  Skin:  Negative       Eyes:  Positive for glasses    ENT:  Positive for hearing loss;sinus trouble hearing aids  Respiratory:  Positive for dyspnea on exertion;cough productive cough   Cardiovascular:    Positive for;lightheadedness;dizziness;edema;fatigue lightheaded/dizzy with exertion and when standing from a stooping position  Gastroenterology: Positive for reflux controlled with medication  Genitourinary:  Positive for nocturia 2-3x per night  Musculoskeletal:  Positive for nocturnal cramping    Neurologic:  Positive for numbness or tingling of hands numbness in left arm; legs, pins and needle feeling in legs  Psychiatric:  Positive for sleep disturbances;anxiety night munoz that wake him screaming, snoring  Heme/Lymph/Imm:  Positive for chills    Endocrine:  Negative        Physical Exam:  Vitals: /72 (BP Location: Right arm, Patient Position: Chair, Cuff Size: Adult Regular)  Pulse 60  Ht 1.676 m (5' 6\")  Wt 61 kg (134 lb 6.4 oz)  BMI 21.69 kg/m2    Constitutional:  cooperative, alert and oriented, well developed, well nourished, in no acute distress        Skin:  warm and dry to " the touch, no apparent skin lesions or masses noted        Head:  normocephalic, no masses or lesions        Eyes:  pupils equal and round, conjunctivae and lids unremarkable, sclera white, no xanthalasma, EOMS intact, no nystagmus        ENT:  no pallor or cyanosis, dentition good;no pallor or cyanosis hearing aide(s) present      Neck:  carotid pulses are full and equal bilaterally, JVP normal, no carotid bruit, no thyromegaly    (Right carotid bruit.)    Chest:  normal breath sounds, clear to auscultation, normal A-P diameter, normal symmetry, normal respiratory excursion, no use of accessory muscles          Cardiac: regular rhythm;normal S1 and S2;apical impulse not displaced       systolic ejection murmur;RUSB;grade 2;radiation to the carotid          Abdomen:  abdomen soft, non-tender, BS normoactive, no mass, no HSM, no bruits        Vascular: pulses full and equal, no bruits auscultated                                        Extremities and Back:  no deformities, clubbing, cyanosis, erythema observed;no edema              Neurological:  affect appropriate, oriented to time, person and place        Thank you for allowing me to participate in the care of your patient.    Sincerely,     Patric Kulkarni MD    [facility]

## 2017-08-15 NOTE — PROGRESS NOTES
HPI and Plan:   See dictation    Orders Placed This Encounter   Procedures     Lipid Profile     ALT     Lipid Profile     ALT     Follow-Up with Cardiac Advanced Practice Provider     Follow-Up with Cardiologist       No orders of the defined types were placed in this encounter.      Medications Discontinued During This Encounter   Medication Reason     Cholecalciferol (VITAMIN D) 1000 UNITS capsule Stopped by Patient         Encounter Diagnoses   Name Primary?     Dizziness      ASCVD (arteriosclerotic cardiovascular disease) Yes     Orthostasis      Coronary artery disease involving native coronary artery of native heart without angina pectoris      Essential hypertension        CURRENT MEDICATIONS:  Current Outpatient Prescriptions   Medication Sig Dispense Refill     atorvastatin (LIPITOR) 40 MG tablet Take 1 tablet (40 mg) by mouth daily 90 tablet 1     lisinopril (PRINIVIL/ZESTRIL) 10 MG tablet Take 1 tablet (10 mg) by mouth daily 90 tablet 1     metoprolol (TOPROL-XL) 25 MG 24 hr tablet Take 0.5 tablets (12.5 mg) by mouth daily 45 tablet 1     amLODIPine (NORVASC) 5 MG tablet Take 1 tablet (5 mg) by mouth daily 90 tablet 1     finasteride (PROSCAR) 5 MG tablet TAKE ONE TABLET BY MOUTH ONCE DAILY 90 tablet 3     alfuzosin (UROXATRAL) 10 MG 24 hr tablet Take 1 tablet (10 mg) by mouth daily 90 tablet 3     fluticasone (FLONASE) 50 MCG/ACT nasal spray Spray 1-2 sprays into both nostrils daily 16 g 3     order for DME Jobst Stockings. Knee high. Medium compression (20-30 mmHg). Dispense 2 pairs. 2 each 5     LANSOPRAZOLE PO Take 30 mg by mouth daily       Calcium Carb-Cholecalciferol (CALCIUM 600 + D PO) With magnesium and zinc       aspirin 81 MG tablet Take 1 tablet (81 mg) by mouth daily 30 tablet      sennosides (SENNA) 8.6 MG tablet Take 1 tablet by mouth as needed  120 each        ALLERGIES   No Known Allergies    PAST MEDICAL HISTORY:  Past Medical History:   Diagnosis Date     Anxiety      Arthritis      mild in neck     Benign neoplasm of colon 12/2007    colonic polyps     BPPV (benign paroxysmal positional vertigo) 9/16/2008     Coronary artery disease     3/11: PTCA & JACK to prox Cfx     Gastro-oesophageal reflux disease      Heart attack (H) 4/29/2016     Hyperlipidemia      Near syncope      Palpitations      Syncope      Unspecified essential hypertension      Urinary sys symptom NEC     BPH sx     Vertigo        PAST SURGICAL HISTORY:  Past Surgical History:   Procedure Laterality Date     BACK SURGERY  1993     CATARACT IOL, RT/LT  4/14/10    Right     CORONARY ANGIOGRAPHY ADULT ORDER  3/22/11     HEART CATH, ANGIOPLASTY  3/22/11    PTCA & JACK to prox Cfx     HYDROCELECTOMY INGUINAL  9/4/2012    Procedure: HYDROCELECTOMY INGUINAL;  Left Hydrocelectomy;  Surgeon: Wili Yan MD;  Location: [facility] OR       FAMILY HISTORY:  Family History   Problem Relation Age of Onset     DIABETES Mother      HEART DISEASE Father 97       SOCIAL HISTORY:  Social History     Social History     Marital status:      Spouse name: N/A     Number of children: N/A     Years of education: N/A     Social History Main Topics     Smoking status: Never Smoker     Smokeless tobacco: Never Used     Alcohol use No     Drug use: No     Sexual activity: Not Currently     Other Topics Concern     Caffeine Concern Yes     green tea     Sleep Concern Yes     night munoz      Stress Concern Yes     worries about every thing     Special Diet No     watching sodium intake     Exercise Yes     walking 20 minutes daily     Social History Narrative       Review of Systems:  Skin:  Negative       Eyes:  Positive for glasses    ENT:  Positive for hearing loss;sinus trouble hearing aids  Respiratory:  Positive for dyspnea on exertion;cough productive cough   Cardiovascular:    Positive for;lightheadedness;dizziness;edema;fatigue lightheaded/dizzy with exertion and when standing from a stooping position  Gastroenterology: Positive for reflux  "controlled with medication  Genitourinary:  Positive for nocturia 2-3x per night  Musculoskeletal:  Positive for nocturnal cramping    Neurologic:  Positive for numbness or tingling of hands numbness in left arm; legs, pins and needle feeling in legs  Psychiatric:  Positive for sleep disturbances;anxiety night munoz that wake him screaming, snoring  Heme/Lymph/Imm:  Positive for chills    Endocrine:  Negative        Physical Exam:  Vitals: /72 (BP Location: Right arm, Patient Position: Chair, Cuff Size: Adult Regular)  Pulse 60  Ht 1.676 m (5' 6\")  Wt 61 kg (134 lb 6.4 oz)  BMI 21.69 kg/m2    Constitutional:  cooperative, alert and oriented, well developed, well nourished, in no acute distress        Skin:  warm and dry to the touch, no apparent skin lesions or masses noted        Head:  normocephalic, no masses or lesions        Eyes:  pupils equal and round, conjunctivae and lids unremarkable, sclera white, no xanthalasma, EOMS intact, no nystagmus        ENT:  no pallor or cyanosis, dentition good;no pallor or cyanosis hearing aide(s) present      Neck:  carotid pulses are full and equal bilaterally, JVP normal, no carotid bruit, no thyromegaly    (Right carotid bruit.)    Chest:  normal breath sounds, clear to auscultation, normal A-P diameter, normal symmetry, normal respiratory excursion, no use of accessory muscles          Cardiac: regular rhythm;normal S1 and S2;apical impulse not displaced       systolic ejection murmur;RUSB;grade 2;radiation to the carotid          Abdomen:  abdomen soft, non-tender, BS normoactive, no mass, no HSM, no bruits        Vascular: pulses full and equal, no bruits auscultated                                        Extremities and Back:  no deformities, clubbing, cyanosis, erythema observed;no edema              Neurological:  affect appropriate, oriented to time, person and place              JANEE Cartwright, AMARA CNP  [address]  [place] " [id number]

## 2017-08-15 NOTE — PROGRESS NOTES
August 15, 2017     Kale Thompson MD    [address] [place]  [address]       RE: Jessica Mckeon   MRN: [id number]   : 10/26/1929      Dear Kale:      It is my pleasure to see your patient, Jessica Mckeon, who is a very pleasant 87-year-old gentleman with a history of hypertension and coronary artery disease.  If you remember, he had an angioplasty and stenting to his circumflex in . If you remember, he was complaining of dizziness and lightheadedness. The dose of metoprolol has been reduced so he is now on 12.5 mg off metoprolol succinate. Holter monitor was performed and reported on 2017 which was 4 days ago this showed that the average heart rate is 71 bpm the slowest heart rate was 44 bpm on the fastest was 118 bpm. There were no long pauses. He had frequent ventricular ectopic and less frequent supraventricular ectopic however none of these would cause dizziness or lightheadedness. The reduction in dose of metoprolol however does appear to have significantly improved the dizziness and lightheadedness. If you remember his symptoms where quite orthostatic sounding. He's lipid profile was excellent on  of this year with an LDL 50 HDL 53 triglycerides of 57 with a total cholesterol 114. His blood pressure is borderline today at 140/72. His pulse rate is 60 sinus today.      He does have carotid bruits on the right side but carotid ultrasound did not show flow limiting disease with less than 50% stenosis bilaterally on carotid ultrasound.    He has been complaining of some fatigue when trying to walk up stairs or exercise. I do not see any cardiac cause for this given his echocardiogram last month showed normal ejection fraction and no significant valvular heart disease and given the fact that his nuclear stress test last year was normal.      IMPRESSION:   1.  Asymptomatic with respect to coronary artery disease.   2.  Hypertension.  Blood pressure is at the  upper limits of normal.   3.  Right carotid bruit with no evidence of flow limiting lesions on ultrasound as described above  4. Holter monitor does not show any pathology that would cause dizziness or lightheadedness as described above. There has been a significant improvement in his dizziness on reducing the dose of his beta blocker medication.  5. Fatigue. The cause of this is unclear but is unlikely to be cardiac as I mentioned above    PLAN:   I will have the patient follow-up with Jasvir Jones NP in 6 months time with the lipid profile. He will follow-up with me in one year. As always he's been told to contact me if he's any questions or concerns.     Sincerely,            Patric Kulkarni MD, [facility]                    D: 10/26/2016 15:20   T: 10/26/2016 23:33   MT: SHELDON      Name:     TRAY BLAKE   MRN:      [id number]        Account:      [id number]   :      10/26/1929           Service Date: 08/15/2017     Document: [id number]

## 2017-08-29 ENCOUNTER — OFFICE VISIT (OUTPATIENT)
Dept: PEDIATRICS | Facility: CLINIC | Age: 82
End: 2017-08-29
Payer: COMMERCIAL

## 2017-08-29 VITALS
SYSTOLIC BLOOD PRESSURE: 128 MMHG | WEIGHT: 134 LBS | DIASTOLIC BLOOD PRESSURE: 70 MMHG | OXYGEN SATURATION: 97 % | HEART RATE: 63 BPM | BODY MASS INDEX: 21.63 KG/M2 | TEMPERATURE: 97.5 F

## 2017-08-29 DIAGNOSIS — R35.1 BENIGN PROSTATIC HYPERPLASIA WITH NOCTURIA: Primary | ICD-10-CM

## 2017-08-29 DIAGNOSIS — G25.81 RESTLESS LEG SYNDROME: ICD-10-CM

## 2017-08-29 DIAGNOSIS — N40.1 BENIGN PROSTATIC HYPERPLASIA WITH NOCTURIA: Primary | ICD-10-CM

## 2017-08-29 PROCEDURE — 99213 OFFICE O/P EST LOW 20 MIN: CPT | Performed by: INTERNAL MEDICINE

## 2017-08-29 RX ORDER — ROPINIROLE 0.5 MG/1
0.5 TABLET, FILM COATED ORAL AT BEDTIME
Qty: 30 TABLET | Refills: 1 | Status: SHIPPED | OUTPATIENT
Start: 2017-08-29 | End: 2017-11-30

## 2017-08-29 NOTE — NURSING NOTE
"Chief Complaint   Patient presents with     Recheck Medication       Initial /78 (Cuff Size: Adult Regular)  Pulse 63  Temp 97.5  F (36.4  C) (Oral)  Wt 134 lb (60.8 kg)  SpO2 97%  BMI 21.63 kg/m2 Estimated body mass index is 21.63 kg/(m^2) as calculated from the following:    Height as of 8/15/17: 5' 6\" (1.676 m).    Weight as of this encounter: 134 lb (60.8 kg).  Medication Reconciliation: sujata Lovett   "

## 2017-08-29 NOTE — PROGRESS NOTES
"  SUBJECTIVE:   Jessica Mckeon is a 87 year old male who presents to clinic today for the following health issues:    Pt states he has been waking up at night time to use the rest room and is wondering if its from one of his medications.     Having sx of increased urinary frequency at nighttime.  Up many nights 5-6 times to void.   No issues w/ stool.  Has daytime frequency, but not nearly this severe.  No dysuria, fevers, chills. No significant leg edema noted.   Has been taking medications for BPH - taking Uroxatral and Proscar, both at maximal doses.  Has not seen a urologist recently.    Also has legs \"jumping\" when he goes to bed. This also interferes with his sleep.     Problem list and histories reviewed & adjusted, as indicated.    Labs reviewed in EPIC    Reviewed and updated as needed this visit by clinical staff  Tobacco  Allergies  Meds  Med Hx  Surg Hx  Fam Hx  Soc Hx      Reviewed and updated as needed this visit by Provider  Tobacco  Allergies  Meds  Problems  Med Hx  Surg Hx  Fam Hx  Soc Hx          ROS:  Constitutional, HEENT, cardiovascular, pulmonary, gi and gu systems are negative, except as otherwise noted.      OBJECTIVE:   /70  Pulse 63  Temp 97.5  F (36.4  C) (Oral)  Wt 134 lb (60.8 kg)  SpO2 97%  BMI 21.63 kg/m2  Body mass index is 21.63 kg/(m^2).  GEN: no distress  SKIN: no rashes  LUNGS: Clear to auscultation bilaterally. No rhonchi, rales, wheezes or retractions.  CV: Regular rate and rhythm.  No murmurs, rubs or gallops. Pulses 2+ radial.  ABD: Bowel sounds positive throughout. Soft, nontender, nondistended. No organomegaly. No masses.  EXTR: no edema    ASSESSMENT/PLAN:       ICD-10-CM    1. Benign prostatic hyperplasia with nocturia N40.1 UROLOGY ADULT REFERRAL    R35.1    2. Restless leg syndrome G25.81 rOPINIRole (REQUIP) 0.5 MG tablet     Sleep issues are likely d/t a combination of BPH causing urinary frequency as well as RLS    Patient Instructions   Try " taking Uroxatral (Alfuzosin) 10 mg in the evening    Call to set up a urology visit   [facility] [place] [phone]       For your leg symptoms, start Requip (Ropinirole) 0.5 mg around bedtime      Kale Thompson MD  [facility]

## 2017-08-29 NOTE — PATIENT INSTRUCTIONS
Try taking Uroxatral (Alfuzosin) 10 mg in the evening      Call to set up a urology visit   [facility] [place] [phone]         For your leg symptoms, start Requip (Ropinirole) 0.5 mg around bedtime

## 2017-08-29 NOTE — MR AVS SNAPSHOT
After Visit Summary   8/29/2017    Jessica Mckeon    MRN: [id number]           Patient Information     Date Of Birth          10/26/1929        Visit Information        Provider Department      8/29/2017 12:45 PM Kale Thompson MD; [facility]        Today's Diagnoses     Benign prostatic hyperplasia with nocturia    -  1    Restless leg syndrome          Care Instructions    Try taking Uroxatral (Alfuzosin) 10 mg in the evening      Call to set up a urology visit   [facility] [phone]         For your leg symptoms, start Requip (Ropinirole) 0.5 mg around bedtime          Follow-ups after your visit        Additional Services     UROLOGY ADULT REFERRAL       Your provider has referred you to:  [facility] [phone]   [email or web address]    Please be aware that coverage of these services is subject to the terms and limitations of your health insurance plan.  Call member services at your health plan with any benefit or coverage questions.      Please bring the following with you to your appointment:    (1) Any X-Rays, CTs or MRIs which have been performed.  Contact the facility where they were done to arrange for  prior to your scheduled appointment.    (2) List of current medications  (3) This referral request   (4) Any documents/labs given to you for this referral                  Who to contact     If you have questions or need follow up information about today's clinic visit or your schedule please contact [facility] directly at [phone].  Normal or non-critical lab and imaging results will be communicated to you by MyChart, letter or phone within 4 business days after the clinic has received the results. If you do not hear from us within 7 days, please contact the clinic through MyChart or phone. If you have a critical or abnormal lab result, we will notify you by phone  "as soon as possible.  Submit refill requests through Move In History or call your pharmacy and they will forward the refill request to us. Please allow 3 business days for your refill to be completed.          Additional Information About Your Visit        Move In History Information     Move In History lets you send messages to your doctor, view your test results, renew your prescriptions, schedule appointments and more. To sign up, go to [email or web address] . Click on \"Log in\" on the left side of the screen, which will take you to the Welcome page. Then click on \"Sign up Now\" on the right side of the page.     You will be asked to enter the access code listed below, as well as some personal information. Please follow the directions to create your username and password.     Your access code is: [email or web address]  Expires: 2017 10:11 AM     Your access code will  in 90 days. If you need help or a new code, please call your [place] clinic or [phone].        Care EveryWhere ID     This is your Care EveryWhere ID. This could be used by other organizations to access your [place] medical records  [id number]        Your Vitals Were     Pulse Temperature Pulse Oximetry BMI (Body Mass Index)          63 97.5  F (36.4  C) (Oral) 97% 21.63 kg/m2         Blood Pressure from Last 3 Encounters:   17 170/78   08/15/17 140/72   17 120/66    Weight from Last 3 Encounters:   17 134 lb (60.8 kg)   08/15/17 134 lb 6.4 oz (61 kg)   17 134 lb (60.8 kg)              We Performed the Following     UROLOGY ADULT REFERRAL          Today's Medication Changes          These changes are accurate as of: 17  1:20 PM.  If you have any questions, ask your nurse or doctor.               Start taking these medicines.        Dose/Directions    rOPINIRole 0.5 MG tablet   Commonly known as:  REQUIP   Used for:  Restless leg syndrome   Started by:  Kale Thompson MD        Dose:  0.5 mg   Take 1 tablet (0.5 mg) by mouth At " Bedtime   Quantity:  30 tablet   Refills:  1            Where to get your medicines      These medications were sent to [facility] [address]  [address] [address], [facility] [address]     Phone:  [phone]     rOPINIRole 0.5 MG tablet                Primary Care Provider Office Phone # Fax #    Kale Thompson [phone] [address] [place] [address]        Equal Access to Services     [facility]: Hadii aad ku hadasho Soomaali, waaxda luqadaha, qaybta kaalmada adeegyada, waxay idiin hayaan adeeg kharash la'felix . So [facility] [phone].    ATENCIÓN: Si habla español, tiene a hylton disposición servicios gratuitos de asistencia lingüística. [facility] [phone].    We comply with applicable federal civil rights laws and [place] laws. We do not discriminate on the basis of race, color, national origin, age, disability sex, sexual orientation or gender identity.            Thank you!     Thank you for choosing [facility]  for your care. Our goal is always to provide you with excellent care. Hearing back from our patients is one way we can continue to improve our services. Please take a few minutes to complete the written survey that you may receive in the mail after your visit with us. Thank you!             Your Updated Medication List - Protect others around you: Learn how to safely use, store and throw away your medicines at www.disposemymeds.org.          This list is accurate as of: 8/29/17  1:20 PM.  Always use your most recent med list.                   Brand Name Dispense Instructions for use Diagnosis    alfuzosin 10 MG 24 hr tablet    UROXATRAL    90 tablet    Take 1 tablet (10 mg) by mouth daily    Essential hypertension with goal blood pressure less than 140/90       amLODIPine 5 MG tablet    NORVASC    90 tablet    Take 1 tablet (5 mg) by mouth daily    Coronary artery disease involving native coronary  artery of native heart without angina pectoris, Essential hypertension with goal blood pressure less than 140/90       aspirin 81 MG tablet     30 tablet    Take 1 tablet (81 mg) by mouth daily        CALCIUM 600 + D PO      With magnesium and zinc        finasteride 5 MG tablet    PROSCAR    90 tablet    TAKE ONE TABLET BY MOUTH ONCE DAILY    Benign prostatic hyperplasia       fluticasone 50 MCG/ACT spray    FLONASE    16 g    Spray 1-2 sprays into both nostrils daily    Chronic rhinitis       LANSOPRAZOLE PO      Take 30 mg by mouth daily        LIPITOR 40 MG tablet   Generic drug:  atorvastatin     90 tablet    Take 1 tablet (40 mg) by mouth daily    Pure hypercholesterolemia       lisinopril 10 MG tablet    PRINIVIL/ZESTRIL    90 tablet    Take 1 tablet (10 mg) by mouth daily    Coronary artery disease involving native coronary artery of native heart without angina pectoris, Essential hypertension with goal blood pressure less than 140/90       metoprolol 25 MG 24 hr tablet    TOPROL-XL    45 tablet    Take 0.5 tablets (12.5 mg) by mouth daily    ASCVD (arteriosclerotic cardiovascular disease), Essential hypertension       order for DME     2 each    Jobst Stockings. Knee high. Medium compression (20-30 mmHg). Dispense 2 pairs.    Coronary artery disease involving native coronary artery of native heart without angina pectoris, Bilateral edema of lower extremity       rOPINIRole 0.5 MG tablet    REQUIP    30 tablet    Take 1 tablet (0.5 mg) by mouth At Bedtime    Restless leg syndrome       sennosides 8.6 MG tablet    SENOKOT    120 each    Take 1 tablet by mouth as needed

## 2017-09-11 DIAGNOSIS — E78.2 MIXED HYPERLIPIDEMIA: ICD-10-CM

## 2017-09-11 NOTE — TELEPHONE ENCOUNTER
*medication was discontinued    rosuvastatin (CRESTOR) 20 MG tablet      Last Written Prescription Date: 5/2/2017  Last Fill Quantity: 90, # refills: 1  Last Office Visit with [facility], [facility] or [facility] prescribing provider: 8/29/2017       Lab Results   Component Value Date    CHOL 114 08/04/2017     Lab Results   Component Value Date    HDL 53 08/04/2017     Lab Results   Component Value Date    LDL 50 08/04/2017     Lab Results   Component Value Date    TRIG 57 08/04/2017     Lab Results   Component Value Date    CHOLHDLRATIO 2.9 06/05/2015

## 2017-09-14 RX ORDER — ROSUVASTATIN CALCIUM 20 MG/1
TABLET, COATED ORAL
Qty: 90 TABLET | Refills: 1 | OUTPATIENT
Start: 2017-09-14

## 2017-09-14 NOTE — TELEPHONE ENCOUNTER
Patient is taking atorvastatin.  Sent back as alternate therapy.  Seen by cardiology and medication is not listed.  Felicia Sauer, RN  Triage Nurse

## 2017-11-03 DIAGNOSIS — N40.0 BPH (BENIGN PROSTATIC HYPERPLASIA): Primary | ICD-10-CM

## 2017-11-07 ENCOUNTER — OFFICE VISIT (OUTPATIENT)
Dept: UROLOGY | Facility: CLINIC | Age: 82
End: 2017-11-07
Attending: INTERNAL MEDICINE
Payer: COMMERCIAL

## 2017-11-07 VITALS
SYSTOLIC BLOOD PRESSURE: 140 MMHG | HEIGHT: 66 IN | HEART RATE: 70 BPM | BODY MASS INDEX: 20.41 KG/M2 | WEIGHT: 127 LBS | DIASTOLIC BLOOD PRESSURE: 70 MMHG

## 2017-11-07 DIAGNOSIS — R35.1 NOCTURIA: Primary | ICD-10-CM

## 2017-11-07 DIAGNOSIS — N40.0 BPH (BENIGN PROSTATIC HYPERPLASIA): ICD-10-CM

## 2017-11-07 LAB
ALBUMIN UR-MCNC: 30 MG/DL
APPEARANCE UR: CLEAR
BILIRUB UR QL STRIP: NEGATIVE
COLOR UR AUTO: YELLOW
GLUCOSE UR STRIP-MCNC: NEGATIVE MG/DL
HGB UR QL STRIP: ABNORMAL
KETONES UR STRIP-MCNC: NEGATIVE MG/DL
LEUKOCYTE ESTERASE UR QL STRIP: NEGATIVE
NITRATE UR QL: NEGATIVE
PH UR STRIP: 6.5 PH (ref 5–7)
RESIDUAL VOLUME (RV) (EXTERNAL): 45
SOURCE: ABNORMAL
SP GR UR STRIP: 1.02 (ref 1–1.03)
UROBILINOGEN UR STRIP-ACNC: 0.2 EU/DL (ref 0.2–1)

## 2017-11-07 PROCEDURE — 81003 URINALYSIS AUTO W/O SCOPE: CPT | Mod: QW | Performed by: UROLOGY

## 2017-11-07 PROCEDURE — 51798 US URINE CAPACITY MEASURE: CPT | Performed by: UROLOGY

## 2017-11-07 PROCEDURE — 99202 OFFICE O/P NEW SF 15 MIN: CPT | Mod: 25 | Performed by: UROLOGY

## 2017-11-07 RX ORDER — RABEPRAZOLE SODIUM 20 MG/1
TABLET, DELAYED RELEASE ORAL
Refills: 1 | COMMUNITY
Start: 2017-09-11 | End: 2018-03-21

## 2017-11-07 RX ORDER — METOPROLOL TARTRATE 25 MG/1
25 TABLET, FILM COATED ORAL
COMMUNITY
End: 2018-09-28 | Stop reason: DRUGHIGH

## 2017-11-07 RX ORDER — TRAMADOL HYDROCHLORIDE 50 MG/1
TABLET ORAL
Refills: 2 | COMMUNITY
Start: 2017-05-03 | End: 2019-09-16

## 2017-11-07 RX ORDER — HYDROCORTISONE ACETATE 25 MG/1
25 SUPPOSITORY RECTAL
COMMUNITY

## 2017-11-07 RX ORDER — PANTOPRAZOLE SODIUM 40 MG/1
40 TABLET, DELAYED RELEASE ORAL
COMMUNITY
End: 2019-09-16

## 2017-11-07 ASSESSMENT — PAIN SCALES - GENERAL: PAINLEVEL: MILD PAIN (2)

## 2017-11-07 NOTE — PROGRESS NOTES
Jessica Mckeon is a pleasant 88-year-old gentleman here to see me regarding nocturia ×5. He's had this for 5 or 6 years and has not improved on alpha blockers and finasteride. He stopped these medication several weeks ago and has noticed no difference in his urination. He has no frequency during the day, dysuria, hematuria, urgency, incontinence. He has no history of UTIs or stones. No family history of prostate cancer. He has a yearly digital rectal exam with his primary care physician  Other past medical history: Evaluated for sleep apnea but never received his CPAP mask from his neurologist. Anxiety, arthritis, colon polyps, positional vertigo, coronary artery disease, GERD, MI, near syncope, hyperlipidemia, palpitations, hypertension, back surgery 1993, cataract surgery, spermatocelectomy 2011, nonsmoker, restless legs  Family history: Heart disease, diabetes  Medications: Not taking Uroxatral, Norvasc, low-dose aspirin, Lipitor, calcium/vitamin D, Flonase, Anusol suppositories, lansoprazole, lisinopril, metoprolol, Protonix, AcipHex, Requip, senna, Ultram  Exam: Normal appearance, normal vital signs, with . Alert and oriented, normocephalic, normal respirations, neuro grossly intact  Urinalysis-unable to void, voided prior to appointment  Bladder scan: 45 cc only  Assessment: Nocturia probably related to sleep apnea that has not been treated-patient never received CPAP mask  Constipation-he feels this is severe. Recommend Metamucil 1 tablespoon daily, fiber cereal in the morning, drink less caffeine, more water during the day. Follow-up Dr. Thompson  Plan: Patient needs to get CPAP mask. Yearly digital rectal exam and urinalysis. See me p.r.n.  We will ask Dr. Chow to contact patient and make sure he obtains his CPAP supplies if appropriate

## 2017-11-07 NOTE — LETTER
11/7/2017      RE: Jessica Mckeon  [address] [place] [address]       Jessica Mckeon is a pleasant 88-year-old gentleman here to see me regarding nocturia ×5. He's had this for 5 or 6 years and has not improved on alpha blockers and finasteride. He stopped these medication several weeks ago and has noticed no difference in his urination. He has no frequency during the day, dysuria, hematuria, urgency, incontinence. He has no history of UTIs or stones. No family history of prostate cancer. He has a yearly digital rectal exam with his primary care physician  Other past medical history: Evaluated for sleep apnea but never received his CPAP mask from his neurologist. Anxiety, arthritis, colon polyps, positional vertigo, coronary artery disease, GERD, MI, near syncope, hyperlipidemia, palpitations, hypertension, back surgery 1993, cataract surgery, spermatocelectomy 2011, nonsmoker, restless legs  Family history: Heart disease, diabetes  Medications: Not taking Uroxatral, Norvasc, low-dose aspirin, Lipitor, calcium/vitamin D, Flonase, Anusol suppositories, lansoprazole, lisinopril, metoprolol, Protonix, AcipHex, Requip, senna, Ultram  Exam: Normal appearance, normal vital signs, with . Alert and oriented, normocephalic, normal respirations, neuro grossly intact  Urinalysis-unable to void, voided prior to appointment  Bladder scan: 45 cc only  Assessment: Nocturia probably related to sleep apnea that has not been treated-patient never received CPAP mask  Constipation-he feels this is severe. Recommend Metamucil 1 tablespoon daily, fiber cereal in the morning, drink less caffeine, more water during the day. Follow-up Dr. Thompson  Plan: Patient needs to get CPAP mask. Yearly digital rectal exam and urinalysis. See me p.r.n.  We will ask Dr. Chow to contact patient and make sure he obtains his CPAP supplies if appropriate      Wili Yan MD

## 2017-11-07 NOTE — MR AVS SNAPSHOT
"              After Visit Summary   2017    Jessica Mckeon    MRN: [id number]           Patient Information     Date Of Birth          10/26/1929        Visit Information        Provider Department      2017 10:15 AM Wili Yan MD; [employer] [facility] [place]        Today's Diagnoses     Nocturia    -  1       Follow-ups after your visit        Who to contact     If you have questions or need follow up information about today's clinic visit or your schedule please contact [facility] [place] directly at [phone].  Normal or non-critical lab and imaging results will be communicated to you by Pricezahart, letter or phone within 4 business days after the clinic has received the results. If you do not hear from us within 7 days, please contact the clinic through [employer]zahart or phone. If you have a critical or abnormal lab result, we will notify you by phone as soon as possible.  Submit refill requests through Mobilygen or call your pharmacy and they will forward the refill request to us. Please allow 3 business days for your refill to be completed.          Additional Information About Your Visit        MyChart Information     Mobilygen lets you send messages to your doctor, view your test results, renew your prescriptions, schedule appointments and more. To sign up, go to [email or web address] . Click on \"Log in\" on the left side of the screen, which will take you to the Welcome page. Then click on \"Sign up Now\" on the right side of the page.     You will be asked to enter the access code listed below, as well as some personal information. Please follow the directions to create your username and password.     Your access code is: [email or web address]  Expires: 2018 11:16 AM     Your access code will  in 90 days. If you need help or a new code, please call your [place] clinic or [phone].        Care " "EveryWhere ID     This is your Care EveryWhere ID. This could be used by other organizations to access your [place] medical records  [id number]        Your Vitals Were     Pulse Height BMI (Body Mass Index)             70 1.676 m (5' 6\") 20.5 kg/m2          Blood Pressure from Last 3 Encounters:   11/07/17 140/70   08/29/17 128/70   08/15/17 140/72    Weight from Last 3 Encounters:   11/07/17 57.6 kg (127 lb)   08/29/17 60.8 kg (134 lb)   08/15/17 61 kg (134 lb 6.4 oz)              We Performed the Following     MEASURE POST-VOID RESIDUAL URINE/BLADDER CAPACITY, US NON-IMAGING (13280)        Primary Care Provider Office Phone # Fax #    Kale Thompson [phone] [address] [place] [address]        Equal Access to Services     [facility]: Hadii aad ku hadasho Soomaali, waaxda luqadaha, qaybta kaalmada adeegyada, waxay tonyin hayaan pierre mosqueda . So [facility] [phone].    ATENCIÓN: Si habla español, tiene a hylton disposición servicios gratuitos de asistencia lingüística. Llame al [phone].    We comply with applicable federal civil rights laws and [place] laws. We do not discriminate on the basis of race, color, national origin, age, disability, sex, sexual orientation, or gender identity.            Thank you!     Thank you for choosing [facility] [place]  for your care. Our goal is always to provide you with excellent care. Hearing back from our patients is one way we can continue to improve our services. Please take a few minutes to complete the written survey that you may receive in the mail after your visit with us. Thank you!             Your Updated Medication List - Protect others around you: Learn how to safely use, store and throw away your medicines at www.disposemymeds.org.          This list is accurate as of: 11/7/17 11:16 AM.  Always use your most recent med list.                   Brand Name Dispense " Instructions for use Diagnosis    alfuzosin 10 MG 24 hr tablet    UROXATRAL    90 tablet    Take 1 tablet (10 mg) by mouth daily    Essential hypertension with goal blood pressure less than 140/90       amLODIPine 5 MG tablet    NORVASC    90 tablet    Take 1 tablet (5 mg) by mouth daily    Coronary artery disease involving native coronary artery of native heart without angina pectoris, Essential hypertension with goal blood pressure less than 140/90       * aspirin 81 MG tablet      Take 81 mg by mouth daily        * aspirin 81 MG tablet     30 tablet    Take 1 tablet (81 mg) by mouth daily        CALCIUM 600 + D PO      With magnesium and zinc        finasteride 5 MG tablet    PROSCAR    90 tablet    TAKE ONE TABLET BY MOUTH ONCE DAILY    Benign prostatic hyperplasia       fluticasone 50 MCG/ACT spray    FLONASE    16 g    Spray 1-2 sprays into both nostrils daily    Chronic rhinitis       hydrocortisone 25 MG Suppository    ANUSOL-HC     Place 25 mg rectally        LANSOPRAZOLE PO      Take 30 mg by mouth daily        LIPITOR 40 MG tablet   Generic drug:  atorvastatin     90 tablet    Take 1 tablet (40 mg) by mouth daily    Pure hypercholesterolemia       lisinopril 10 MG tablet    PRINIVIL/ZESTRIL    90 tablet    Take 1 tablet (10 mg) by mouth daily    Coronary artery disease involving native coronary artery of native heart without angina pectoris, Essential hypertension with goal blood pressure less than 140/90       metoprolol 25 MG 24 hr tablet    TOPROL-XL    45 tablet    Take 0.5 tablets (12.5 mg) by mouth daily    ASCVD (arteriosclerotic cardiovascular disease), Essential hypertension       metoprolol 25 MG tablet    LOPRESSOR     Take 25 mg by mouth        order for DME     2 each    Jobst Stockings. Knee high. Medium compression (20-30 mmHg). Dispense 2 pairs.    Coronary artery disease involving native coronary artery of native heart without angina pectoris, Bilateral edema of lower extremity        pantoprazole 40 MG EC tablet    PROTONIX     Take 40 mg by mouth        RABEprazole 20 MG EC tablet    ACIPHEX          rOPINIRole 0.5 MG tablet    REQUIP    30 tablet    Take 1 tablet (0.5 mg) by mouth At Bedtime    Restless leg syndrome       sennosides 8.6 MG tablet    SENOKOT    120 each    Take 1 tablet by mouth as needed        traMADol 50 MG tablet    ULTRAM          * Notice:  This list has 2 medication(s) that are the same as other medications prescribed for you. Read the directions carefully, and ask your doctor or other care provider to review them with you.

## 2017-11-07 NOTE — NURSING NOTE
Has nocturia every 2 hours  But voids only a.few times in daytime . Was on Finasteride and Uroxatral  For years but recently stopped and he has noticed no increase in symptoms. Unable to void for urine specimen today . He voide dright before appointment . Bladder ultrasound revealed 45 cc Darlene New RUBION

## 2017-11-07 NOTE — LETTER
11/7/2017       RE: Jessica Mckeon  [address] [place] [address]     Dear Colleague,    Thank you for referring your patient, Jessica Mckeon, to the [facility] [place] at [facility]. Please see a copy of my visit note below.    Jessica Mckeon is a pleasant 88-year-old gentleman here to see me regarding nocturia ×5. He's had this for 5 or 6 years and has not improved on alpha blockers and finasteride. He stopped these medication several weeks ago and has noticed no difference in his urination. He has no frequency during the day, dysuria, hematuria, urgency, incontinence. He has no history of UTIs or stones. No family history of prostate cancer. He has a yearly digital rectal exam with his primary care physician  Other past medical history: Evaluated for sleep apnea but never received his CPAP mask from his neurologist. Anxiety, arthritis, colon polyps, positional vertigo, coronary artery disease, GERD, MI, near syncope, hyperlipidemia, palpitations, hypertension, back surgery 1993, cataract surgery, spermatocelectomy 2011, nonsmoker, restless legs  Family history: Heart disease, diabetes  Medications: Not taking Uroxatral, Norvasc, low-dose aspirin, Lipitor, calcium/vitamin D, Flonase, Anusol suppositories, lansoprazole, lisinopril, metoprolol, Protonix, AcipHex, Requip, senna, Ultram  Exam: Normal appearance, normal vital signs, with . Alert and oriented, normocephalic, normal respirations, neuro grossly intact  Urinalysis-unable to void, voided prior to appointment  Bladder scan: 45 cc only  Assessment: Nocturia probably related to sleep apnea that has not been treated-patient never received CPAP mask  Constipation-he feels this is severe. Recommend Metamucil 1 tablespoon daily, fiber cereal in the morning, drink less caffeine, more water during the day. Follow-up Dr. Thompson  Plan: Patient needs to get CPAP mask.  Yearly digital rectal exam and urinalysis. See me p.r.n.  We will ask Dr. Chow to contact patient and make sure he obtains his CPAP supplies if appropriate      Again, thank you for allowing me to participate in the care of your patient.      Sincerely,    Wili Yan MD

## 2017-11-13 ENCOUNTER — OFFICE VISIT (OUTPATIENT)
Dept: PEDIATRICS | Facility: CLINIC | Age: 82
End: 2017-11-13
Payer: COMMERCIAL

## 2017-11-13 VITALS
OXYGEN SATURATION: 94 % | TEMPERATURE: 97.3 F | WEIGHT: 125 LBS | DIASTOLIC BLOOD PRESSURE: 62 MMHG | BODY MASS INDEX: 20.18 KG/M2 | SYSTOLIC BLOOD PRESSURE: 102 MMHG | HEART RATE: 73 BPM

## 2017-11-13 DIAGNOSIS — R35.1 BENIGN PROSTATIC HYPERPLASIA WITH NOCTURIA: ICD-10-CM

## 2017-11-13 DIAGNOSIS — G47.33 OSA (OBSTRUCTIVE SLEEP APNEA): Primary | ICD-10-CM

## 2017-11-13 DIAGNOSIS — I10 ESSENTIAL HYPERTENSION: ICD-10-CM

## 2017-11-13 DIAGNOSIS — N40.1 BENIGN PROSTATIC HYPERPLASIA WITH NOCTURIA: ICD-10-CM

## 2017-11-13 PROCEDURE — 99214 OFFICE O/P EST MOD 30 MIN: CPT | Performed by: INTERNAL MEDICINE

## 2017-11-13 RX ORDER — TOLTERODINE 4 MG/1
4 CAPSULE, EXTENDED RELEASE ORAL AT BEDTIME
Qty: 30 CAPSULE | Refills: 5 | Status: SHIPPED | OUTPATIENT
Start: 2017-11-13 | End: 2018-03-21

## 2017-11-13 NOTE — NURSING NOTE
"Chief Complaint   Patient presents with     Consult       Initial /62 (Cuff Size: Adult Regular)  Pulse 73  Temp 97.3  F (36.3  C) (Oral)  Wt 125 lb (56.7 kg)  SpO2 94%  BMI 20.18 kg/m2 Estimated body mass index is 20.18 kg/(m^2) as calculated from the following:    Height as of 11/7/17: 5' 6\" (1.676 m).    Weight as of this encounter: 125 lb (56.7 kg).  Medication Reconciliation: sujata Lovett   "

## 2017-11-13 NOTE — PROGRESS NOTES
SUBJECTIVE:   Jessica Mckeon is a 88 year old male who presents to clinic today with an interpretor for the following health issues:      Pt is here to talk about possibly getting a CPAP machine. Pt was seen on 11/7 by urology for BPH sx w/ nocturia and realized that he does not have a CPAP machine even though one was ordered over 1 year ago after evaluation w/ sleep study by neurology.     Having persistent BPH sx. Urine frequency. Up at nighttime sometimes several times.    Reviewed notes from [facility] from last fall.  Sleep study last October showed significant sleep apnea.  Auto-CPAP 5-15 cm H2O was ordered but not received.  Has not scheduled f/u visit with neurology since his visit last fall.  Has weight decrease over the past year but has never been obese.  Wt Readings from Last 4 Encounters:   11/13/17 125 lb (56.7 kg)   11/07/17 127 lb (57.6 kg)   08/29/17 134 lb (60.8 kg)   08/15/17 134 lb 6.4 oz (61 kg)     Has HTN and CAD as well. No cardiac sx such as CP, palpitations, PND, orthopnea, MADRID or peripheral edema.     Problem list and histories reviewed & adjusted, as indicated.      Labs reviewed in EPIC    Reviewed and updated as needed this visit by clinical staff  Tobacco  Allergies  Meds  Med Hx  Surg Hx  Fam Hx  Soc Hx      Reviewed and updated as needed this visit by Provider  Tobacco  Allergies  Meds  Problems  Med Hx  Surg Hx  Fam Hx  Soc Hx          ROS:  Constitutional, HEENT, cardiovascular, pulmonary, gi and gu systems are negative, except as otherwise noted.      OBJECTIVE:   /62 (Cuff Size: Adult Regular)  Pulse 73  Temp 97.3  F (36.3  C) (Oral)  Wt 125 lb (56.7 kg)  SpO2 94%  BMI 20.18 kg/m2  Body mass index is 20.18 kg/(m^2).  GEN: No distress  SKIN: No rashes  HEENT: PERRL. EOMI. No nasal d/c. OP moist.  NECK: Supple. No LAD, TM.  LUNGS: Clear to auscultation bilaterally. No rhonchi, rales, wheezes or retractions.  CV: Regular rate and  rhythm.  No murmurs, rubs or gallops. Pulses 2+ radial.  ABD: Bowel sounds positive throughout. Soft, nontender, nondistended.   EXTR: no edema  PSYCH: Normal affect. Well groomed. Good eye contact.     ASSESSMENT/PLAN:       ICD-10-CM    1. NED (obstructive sleep apnea) G47.33 order for DME     order for DME   2. Benign prostatic hyperplasia with nocturia N40.1 tolterodine (DETROL LA) 4 MG 24 hr capsule    R35.1    3. Essential hypertension I10      NED - has not yet received CPAP machine.  DME written for CPAP machine and sent to [facility] in [place].  Included available documentation from his sleep study.   Should follow-up in 1-2 months and plan sleep center f/u in the future.    Pt requests Detrol to help with nighttime sx. Rx sent.    HTN - continue w/ current medications.     Kale Thompson MD  [facility]

## 2017-11-30 DIAGNOSIS — I10 ESSENTIAL HYPERTENSION WITH GOAL BLOOD PRESSURE LESS THAN 140/90: ICD-10-CM

## 2017-11-30 DIAGNOSIS — G25.81 RESTLESS LEG SYNDROME: Primary | ICD-10-CM

## 2017-11-30 DIAGNOSIS — E78.5 HYPERLIPIDEMIA LDL GOAL <70: ICD-10-CM

## 2017-12-01 DIAGNOSIS — I25.10 ASCVD (ARTERIOSCLEROTIC CARDIOVASCULAR DISEASE): ICD-10-CM

## 2017-12-01 DIAGNOSIS — I10 ESSENTIAL HYPERTENSION WITH GOAL BLOOD PRESSURE LESS THAN 140/90: ICD-10-CM

## 2017-12-01 DIAGNOSIS — I25.10 CORONARY ARTERY DISEASE INVOLVING NATIVE CORONARY ARTERY OF NATIVE HEART WITHOUT ANGINA PECTORIS: ICD-10-CM

## 2017-12-01 DIAGNOSIS — I10 ESSENTIAL HYPERTENSION: ICD-10-CM

## 2017-12-01 RX ORDER — LISINOPRIL 10 MG/1
10 TABLET ORAL DAILY
Qty: 90 TABLET | Status: SHIPPED | OUTPATIENT
Start: 2017-12-01 | End: 2017-12-01

## 2017-12-01 RX ORDER — AMLODIPINE BESYLATE 5 MG/1
5 TABLET ORAL DAILY
Qty: 90 TABLET | Refills: 2 | Status: SHIPPED | OUTPATIENT
Start: 2017-12-01 | End: 2018-12-11

## 2017-12-01 RX ORDER — LISINOPRIL 10 MG/1
10 TABLET ORAL DAILY
Qty: 90 TABLET | Refills: 2 | Status: SHIPPED | OUTPATIENT
Start: 2017-12-01 | End: 2018-12-13

## 2017-12-01 RX ORDER — ALFUZOSIN HYDROCHLORIDE 10 MG/1
TABLET, EXTENDED RELEASE ORAL
Qty: 90 TABLET | Refills: 3 | Status: SHIPPED | OUTPATIENT
Start: 2017-12-01 | End: 2018-12-11

## 2017-12-01 RX ORDER — ROSUVASTATIN CALCIUM 20 MG/1
TABLET, COATED ORAL
Qty: 90 TABLET | Refills: 3 | Status: SHIPPED | OUTPATIENT
Start: 2017-12-01 | End: 2018-12-11

## 2017-12-01 RX ORDER — METOPROLOL SUCCINATE 25 MG/1
12.5 TABLET, EXTENDED RELEASE ORAL DAILY
Qty: 45 TABLET | Refills: 2 | Status: SHIPPED | OUTPATIENT
Start: 2017-12-01 | End: 2018-12-13

## 2017-12-01 RX ORDER — ROPINIROLE 0.5 MG/1
TABLET, FILM COATED ORAL
Qty: 90 TABLET | Refills: 3 | Status: SHIPPED | OUTPATIENT
Start: 2017-12-01 | End: 2019-09-16

## 2017-12-01 NOTE — TELEPHONE ENCOUNTER
Requested Prescriptions   Pending Prescriptions Disp Refills     rOPINIRole (REQUIP) 0.5 MG tablet [Pharmacy Med Name: ROPINIRole 0.5MG    TAB] 30 tablet 1     Sig: TAKE ONE TABLET BY MOUTH AT BEDTIME    Antiparkinson's Agents Protocol Passed    11/30/2017 10:08 AM       Passed - Blood pressure under 140/90    BP Readings from Last 3 Encounters:   11/13/17 102/62   11/07/17 140/70   08/29/17 128/70                Passed - Recent or future visit with authorizing provider's specialty    Patient had office visit in the last year or has a visit in the next 30 days with authorizing provider.  See chart review.              Passed - Patient is age 18 or older        rosuvastatin (CRESTOR) 20 MG tablet [Pharmacy Med Name: ROSUVASTATIN 20MG TAB] 90 tablet 0     Sig: TAKE ONE TABLET BY MOUTH ONCE DAILY FOR CHOLESTEROL    Statins Protocol Passed    11/30/2017 10:08 AM       Passed - LDL on file in past 12 months    Recent Labs   Lab Test  08/04/17   0754   LDL  50            Passed - No abnormal creatine kinase in past 12 months    No lab results found.         Passed - Recent or future visit with authorizing provider    Patient had office visit in the last year or has a visit in the next 30 days with authorizing provider.  See chart review.              Passed - Patient is age 18 or older        alfuzosin (UROXATRAL) 10 MG 24 hr tablet [Pharmacy Med Name: ALFUZOSIN HCL ER 10MG      TAB] 90 tablet 3     Sig: TAKE ONE TABLET BY MOUTH ONCE DAILY    Alpha Blockers Passed    11/30/2017 10:08 AM       Passed - BP is less than 140/90    BP Readings from Last 3 Encounters:   11/13/17 102/62   11/07/17 140/70   08/29/17 128/70                Passed - Recent or future visit with authorizing provider's specialty    Patient had office visit in the last year or has a visit in the next 30 days with authorizing provider.  See chart review.              Passed - Patient does not have Tadalafil, Vardenafil, or Sildenafil on their medication  list       Passed - Patient is 18 years of age or older

## 2017-12-01 NOTE — TELEPHONE ENCOUNTER
Routing refill request to provider for review/approval because:  Drug interaction warning -- high pharmacy warning. Please review. Thanks.     Olga Donaldson RN -- [facility]

## 2018-03-21 DIAGNOSIS — N40.1 BENIGN PROSTATIC HYPERPLASIA WITH NOCTURIA: ICD-10-CM

## 2018-03-21 DIAGNOSIS — R35.1 BENIGN PROSTATIC HYPERPLASIA WITH NOCTURIA: ICD-10-CM

## 2018-03-21 DIAGNOSIS — K21.9 GASTROESOPHAGEAL REFLUX DISEASE WITHOUT ESOPHAGITIS: Primary | ICD-10-CM

## 2018-03-21 RX ORDER — TOLTERODINE 4 MG/1
CAPSULE, EXTENDED RELEASE ORAL
Qty: 90 CAPSULE | Refills: 3 | Status: SHIPPED | OUTPATIENT
Start: 2018-03-21 | End: 2019-09-16

## 2018-03-21 RX ORDER — RABEPRAZOLE SODIUM 20 MG/1
TABLET, DELAYED RELEASE ORAL
Qty: 90 TABLET | Refills: 3 | Status: SHIPPED | OUTPATIENT
Start: 2018-03-21 | End: 2019-09-16

## 2018-03-21 NOTE — TELEPHONE ENCOUNTER
"Requested Prescriptions   Pending Prescriptions Disp Refills     tolterodine (DETROL LA) 4 MG 24 hr capsule [Pharmacy Med Name: TOLTERODINE ER 4MG CAP]    Last Written Prescription Date:  11/13/2017  Last Fill Quantity: 30,  # refills: 5   Last office visit: 11/13/2017 with prescribing provider:  Kale Thompson     Future Office Visit:     90 capsule 1     Sig: TAKE ONE CAPSULE BY MOUTH AT BEDTIME    Muscarinic Antagonists (Urinary Incontinence Agents) Passed    3/21/2018  9:49 AM       Passed - Recent (12 mo) or future (30 days) visit within the authorizing provider's specialty    Patient had office visit in the last 12 months or has a visit in the next 30 days with authorizing provider or within the authorizing provider's specialty.  See \"Patient Info\" tab in inbasket, or \"Choose Columns\" in Meds & Orders section of the refill encounter.           Passed - Patient does not have a diagnosis of glaucoma on the problem list    If glaucoma diagnosis is new, refer refill to physician.         Passed - Patient is 18 years of age or older        RABEprazole (ACIPHEX) 20 MG EC tablet [Pharmacy Med Name: RABEPRAZOLE 20MG    TAB]    Last Written Prescription Date:  6/13/2017  Last Fill Quantity: 90,  # refills: 3   Last office visit: 11/13/2017 with prescribing provider:  Kale Thompson     Future Office Visit:     90 tablet 0     Sig: TAKE ONE TABLET BY MOUTH ONCE DAILY    PPI Protocol Passed    3/21/2018  9:49 AM       Passed - Not on Clopidogrel (unless Pantoprazole ordered)       Passed - No diagnosis of osteoporosis on record       Passed - Recent (12 mo) or future (30 days) visit within the authorizing provider's specialty    Patient had office visit in the last 12 months or has a visit in the next 30 days with authorizing provider or within the authorizing provider's specialty.  See \"Patient Info\" tab in inbasket, or \"Choose Columns\" in Meds & Orders section of the refill encounter.           Passed - Patient is age " 18 or older

## 2018-06-29 ENCOUNTER — OFFICE VISIT (OUTPATIENT)
Dept: PEDIATRICS | Facility: CLINIC | Age: 83
End: 2018-06-29
Payer: COMMERCIAL

## 2018-06-29 VITALS
HEART RATE: 69 BPM | SYSTOLIC BLOOD PRESSURE: 164 MMHG | DIASTOLIC BLOOD PRESSURE: 64 MMHG | RESPIRATION RATE: 14 BRPM | WEIGHT: 130 LBS | OXYGEN SATURATION: 97 % | BODY MASS INDEX: 20.89 KG/M2 | HEIGHT: 66 IN

## 2018-06-29 DIAGNOSIS — M48.061 SPINAL STENOSIS OF LUMBAR REGION WITHOUT NEUROGENIC CLAUDICATION: ICD-10-CM

## 2018-06-29 DIAGNOSIS — E78.5 HYPERLIPIDEMIA LDL GOAL <70: ICD-10-CM

## 2018-06-29 DIAGNOSIS — M54.16 LUMBAR RADICULOPATHY: ICD-10-CM

## 2018-06-29 DIAGNOSIS — T73.3XXA FATIGUE DUE TO EXCESSIVE EXERTION, INITIAL ENCOUNTER: ICD-10-CM

## 2018-06-29 DIAGNOSIS — Z00.00 MEDICARE ANNUAL WELLNESS VISIT, SUBSEQUENT: Primary | ICD-10-CM

## 2018-06-29 LAB
ALBUMIN SERPL-MCNC: 4.2 G/DL (ref 3.4–5)
ALP SERPL-CCNC: 49 U/L (ref 40–150)
ALT SERPL W P-5'-P-CCNC: 27 U/L (ref 0–70)
ANION GAP SERPL CALCULATED.3IONS-SCNC: 7 MMOL/L (ref 3–14)
AST SERPL W P-5'-P-CCNC: 35 U/L (ref 0–45)
BILIRUB SERPL-MCNC: 0.3 MG/DL (ref 0.2–1.3)
BUN SERPL-MCNC: 25 MG/DL (ref 7–30)
CALCIUM SERPL-MCNC: 9.6 MG/DL (ref 8.5–10.1)
CHLORIDE SERPL-SCNC: 105 MMOL/L (ref 94–109)
CHOLEST SERPL-MCNC: 121 MG/DL
CO2 SERPL-SCNC: 30 MMOL/L (ref 20–32)
CREAT SERPL-MCNC: 1.18 MG/DL (ref 0.66–1.25)
ERYTHROCYTE [DISTWIDTH] IN BLOOD BY AUTOMATED COUNT: 13.6 % (ref 10–15)
GFR SERPL CREATININE-BSD FRML MDRD: 58 ML/MIN/1.7M2
GLUCOSE SERPL-MCNC: 107 MG/DL (ref 70–99)
HCT VFR BLD AUTO: 37.6 % (ref 40–53)
HDLC SERPL-MCNC: 57 MG/DL
HGB BLD-MCNC: 11.9 G/DL (ref 13.3–17.7)
LDLC SERPL CALC-MCNC: 52 MG/DL
MCH RBC QN AUTO: 27.9 PG (ref 26.5–33)
MCHC RBC AUTO-ENTMCNC: 31.6 G/DL (ref 31.5–36.5)
MCV RBC AUTO: 88 FL (ref 78–100)
NONHDLC SERPL-MCNC: 64 MG/DL
PLATELET # BLD AUTO: 163 10E9/L (ref 150–450)
POTASSIUM SERPL-SCNC: 4.1 MMOL/L (ref 3.4–5.3)
PROT SERPL-MCNC: 7.6 G/DL (ref 6.8–8.8)
RBC # BLD AUTO: 4.26 10E12/L (ref 4.4–5.9)
SODIUM SERPL-SCNC: 142 MMOL/L (ref 133–144)
TRIGL SERPL-MCNC: 59 MG/DL
TSH SERPL DL<=0.005 MIU/L-ACNC: 3.11 MU/L (ref 0.4–4)
WBC # BLD AUTO: 5.2 10E9/L (ref 4–11)

## 2018-06-29 PROCEDURE — 36415 COLL VENOUS BLD VENIPUNCTURE: CPT | Performed by: INTERNAL MEDICINE

## 2018-06-29 PROCEDURE — 99397 PER PM REEVAL EST PAT 65+ YR: CPT | Performed by: INTERNAL MEDICINE

## 2018-06-29 PROCEDURE — 80053 COMPREHEN METABOLIC PANEL: CPT | Performed by: INTERNAL MEDICINE

## 2018-06-29 PROCEDURE — 80061 LIPID PANEL: CPT | Performed by: INTERNAL MEDICINE

## 2018-06-29 PROCEDURE — 85027 COMPLETE CBC AUTOMATED: CPT | Performed by: INTERNAL MEDICINE

## 2018-06-29 PROCEDURE — 84443 ASSAY THYROID STIM HORMONE: CPT | Performed by: INTERNAL MEDICINE

## 2018-06-29 NOTE — PROGRESS NOTES
SUBJECTIVE:   Jessica Mckeon is a 88 year old male who presents for Preventive Visit.    Are you in the first 12 months of your Medicare coverage?  No    HPI  Here with an interpretor.    Reviewed medical history.    Noting fatigue with exertion. This is new in the past few months.  Likes to exercise by swimming. Used to do 20 laps w/o significant difficulty. Now goes 3-5 and has to stop.  No chest pain noted.  No significant fatigue with other exertion.  Reviewed cardiac testing done over the past few years - normal stress test 2 years ago. Normal thoracic MRA last year and echocardiogram.  Has f/u cardiology appt in October.     Spinal stenosis. Had MRI just over 1 year ago. Shows prior surgical changes along with spinal stenosis and foraminal narrowing. Has undergone LISET in the past. Having increased pain in the low back with Right radiation to the foot.    Hyperlipidemia. Reviewed labs and medications.    Fall risk:  Fallen 2 or more times in the past year?: No  Any fall with injury in the past year?: No    COGNITIVE SCREENING:  Not able due to language barrier    Reviewed and updated as needed this visit by clinical staff  Tobacco  Allergies  Meds  Med Hx  Surg Hx  Fam Hx  Soc Hx        Reviewed and updated as needed this visit by Provider  Tobacco  Meds  Problems  Med Hx  Surg Hx  Fam Hx  Soc Hx       Social History   Substance Use Topics     Smoking status: Never Smoker     Smokeless tobacco: Never Used     Alcohol use No       No flowsheet data found.No flowsheet data found.      Today's PHQ-2 Score:   PHQ-2 ( 1999 [employer]) 6/29/2018   Q1: Little interest or pleasure in doing things 0   Q2: Feeling down, depressed or hopeless 0   PHQ-2 Score 0       Do you feel safe in your environment - Yes    Do you have a Health Care Directive?: Yes: Patient states has Advance Directive and will bring in a copy to clinic.    Current providers sharing in care for this patient include:   Patient Care  "Team:  Kale Thompson MD as PCP - General    The following health maintenance items are reviewed in Epic and correct as of today:  Health Maintenance   Topic Date Due     PHQ-2 Q1 YR  10/26/1941     ADVANCE DIRECTIVE PLANNING Q5 YRS  10/26/1984     COLONOSCOPY Q5 YR  01/28/2016     FALL RISK ASSESSMENT  06/05/2016     TETANUS Q10 YR  03/21/2017     INFLUENZA VACCINE (Season Ended) 09/01/2018     LIPID MONITORING Q5 YEARS  08/04/2022     PNEUMOCOCCAL  Completed     Labs reviewed in EPIC      Review of Systems  Constitutional, HEENT, cardiovascular, pulmonary, gi and gu systems are negative, except as otherwise noted.    OBJECTIVE:   /64 (BP Location: Right arm, Patient Position: Chair, Cuff Size: Adult Regular)  Pulse 69  Resp 14  Ht 5' 6\" (1.676 m)  Wt 130 lb (59 kg)  SpO2 97%  BMI 20.98 kg/m2 Estimated body mass index is 20.98 kg/(m^2) as calculated from the following:    Height as of this encounter: 5' 6\" (1.676 m).    Weight as of this encounter: 130 lb (59 kg).  Physical Exam  GENERAL: healthy, alert and no distress  EYES: Eyes grossly normal to inspection, PERRL and conjunctivae and sclerae normal  HENT: ear canals and TM's normal, nose and mouth without ulcers or lesions  NECK: no adenopathy, no asymmetry, masses, or scars and thyroid normal to palpation. Radiating heart murmur to the carotids william.   RESP: lungs clear to auscultation - no rales, rhonchi or wheezes  CV: regular rate and rhythm, normal S1 S2, no S3 or S4, 1-2/6 systolic murmur, no click or rub, no peripheral edema and peripheral pulses strong  ABDOMEN: soft, nontender, no hepatosplenomegaly, no masses and bowel sounds normal  MS: no gross musculoskeletal defects noted, no edema  SKIN: no suspicious lesions or rashes  NEURO: Normal strength and tone, mentation intact and speech normal  PSYCH: mentation appears normal, affect normal/bright      ASSESSMENT / PLAN:       ICD-10-CM    1. Medicare annual wellness visit, subsequent Z00.00 " "CBC with platelets     TSH with free T4 reflex     Lipid Profile (Chol, Trig, HDL, LDL calc)     Comprehensive metabolic panel   2. Fatigue due to excessive exertion, initial encounter T73.3XXA CBC with platelets     TSH with free T4 reflex   3. Hyperlipidemia LDL goal <70 E78.5 Lipid Profile (Chol, Trig, HDL, LDL calc)     Comprehensive metabolic panel   4. Spinal stenosis of lumbar region without neurogenic claudication M48.061 PAIN MANAGEMENT REFERRAL   5. Lumbar radiculopathy M54.16 PAIN MANAGEMENT REFERRAL      Fatigue - noted w/ exertion. Unclear if d/t deconditioning or potentially cardiac cause. Recent cardiac evaluation is reassuring. Check labs to r/o other causes. Continue to exercise. Call to move up cardiology visit.     Lumbar stenosis w/ right radiculopathy. Reasonable to try LISET. Referral placed.     End of Life Planning:  Patient currently has an advanced directive: Yes.  Practitioner is supportive of decision.    COUNSELING:  Reviewed preventive health counseling, as reflected in patient instructions    BP Readings from Last 1 Encounters:   06/29/18 164/64     Estimated body mass index is 20.98 kg/(m^2) as calculated from the following:    Height as of this encounter: 5' 6\" (1.676 m).    Weight as of this encounter: 130 lb (59 kg).      Weight management plan noted, stable and monitoring     reports that he has never smoked. He has never used smokeless tobacco.      Appropriate preventive services were discussed with this patient, including applicable screening as appropriate for cardiovascular disease, diabetes, osteopenia/osteoporosis, and glaucoma.  As appropriate for age/gender, discussed screening for colorectal cancer, prostate cancer. Checklist reviewing preventive services available has been given to the patient.    Reviewed patients plan of care and provided an AVS. The Basic Care Plan (routine screening as documented in Health Maintenance) for Jessica meets the Care Plan requirement. This " Care Plan has been established and reviewed with the Patient.    Counseling Resources:  ATP IV Guidelines  Pooled Cohorts Equation Calculator  Breast Cancer Risk Calculator  FRAX Risk Assessment  ICSI Preventive Guidelines  Dietary Guidelines for Americans, 2010  USDA's MyPlate  ASA Prophylaxis  Lung CA Screening    Kale Thompson MD  [facility]

## 2018-06-29 NOTE — LETTER
[facility]  [address]  [place] [address]                  [phone]   July 3, 2018    Jessica Mckeon  [address] [place] [address]      Dear Jessica,    Here is a summary of your recent test results:    1. Your TSH (thyroid function) is normal.     2. Your cholesterol profile looks great!     Your Lipid Goals:   Cholesterol: Desirable is less than 200, Borderline is 200-240   HDL (Good Cholesterol): Desirable is greater than 40   LDL (Bad Cholesterol): Desirable is less than 70, Borderline    Triglycerides (Fats in the Blood): Desirable is less than 150,  Borderline is 150-200     3. Your metabolic panel, including liver and kidney function, glucose (blood sugar) and electrolytes, is within expected limits. Your glucose level is slightly elevated but is not worrisome.     4. Your blood counts are good overall. Your hemoglobin (red blood count) is slightly low, but is similar to previous checks. This level should not cause any symptoms.     Your test results are enclosed.      Please contact me if you have any questions.           Thank you very much for choosing [facility]    Best regards,    Kale Thompson MD        Results for orders placed or performed in visit on 06/29/18   CBC with platelets   Result Value Ref Range    WBC 5.2 4.0 - 11.0 10e9/L    RBC Count 4.26 (L) 4.4 - 5.9 10e12/L    Hemoglobin 11.9 (L) 13.3 - 17.7 g/dL    Hematocrit 37.6 (L) 40.0 - 53.0 %    MCV 88 78 - 100 fl    MCH 27.9 26.5 - 33.0 pg    MCHC 31.6 31.5 - 36.5 g/dL    RDW 13.6 10.0 - 15.0 %    Platelet Count 163 150 - 450 10e9/L   TSH with free T4 reflex   Result Value Ref Range    TSH 3.11 0.40 - 4.00 mU/L   Lipid Profile (Chol, Trig, HDL, LDL calc)   Result Value Ref Range    Cholesterol 121 <200 mg/dL    Triglycerides 59 <150 mg/dL    HDL Cholesterol 57 >39 mg/dL    LDL Cholesterol Calculated 52 <100 mg/dL    Non HDL Cholesterol 64 <130 mg/dL   Comprehensive  metabolic panel   Result Value Ref Range    Sodium 142 133 - 144 mmol/L    Potassium 4.1 3.4 - 5.3 mmol/L    Chloride 105 94 - 109 mmol/L    Carbon Dioxide 30 20 - 32 mmol/L    Anion Gap 7 3 - 14 mmol/L    Glucose 107 (H) 70 - 99 mg/dL    Urea Nitrogen 25 7 - 30 mg/dL    Creatinine 1.18 0.66 - 1.25 mg/dL    GFR Estimate 58 (L) >60 mL/min/1.7m2    GFR Estimate If Black 70 >60 mL/min/1.7m2    Calcium 9.6 8.5 - 10.1 mg/dL    Bilirubin Total 0.3 0.2 - 1.3 mg/dL    Albumin 4.2 3.4 - 5.0 g/dL    Protein Total 7.6 6.8 - 8.8 g/dL    Alkaline Phosphatase 49 40 - 150 U/L    ALT 27 0 - 70 U/L    AST 35 0 - 45 U/L

## 2018-06-29 NOTE — PROGRESS NOTES
"SUBJECTIVE:   Jessica Mkceon is a 88 year old male who presents for Preventive Visit.  Are you in the first 12 months of your Medicare coverage?  No    HPI  {Hearing Test Done (Optional):922308}  {Add if <65 person on Medicare  - Required Questions (Optional):945126}  Fall risk:  {Document Fall Risk in the Assessments Section of the Navigator:584898}  {If any of the above assessments are answered yes, consider ordering appropriate referrals (Optional):757966::\"click delete button to remove this line now\"}  {AWV Cognitive Screenin}    Reviewed and updated as needed this visit by clinical staff         Reviewed and updated as needed this visit by Provider        Social History   Substance Use Topics     Smoking status: Never Smoker     Smokeless tobacco: Never Used     Alcohol use No       No flowsheet data found.{add AUDIT responses (Optional) (A score of 7 for adult men is an indication of hazardous drinking; a score of 8 or more is an indication of an alcohol use disorder.  A score of 7 or more for adult women is an indication of hazardous drinking or an alchohol use disorder):056430}    {Outside tests to abstract? :790325}    {additional problems to add (Optional):467070}    Today's PHQ-2 Score:   PHQ-2 (  [employer]) 2013   Q1: Little interest or pleasure in doing things 0   Q2: Feeling down, depressed or hopeless 0   PHQ-2 Score 0       Do you feel safe in your environment - {YES/NO/NA:076414}    Do you have a Health Care Directive?: {HEALTHCARE DIRECTIVE STATUS:992505}    Current providers sharing in care for this patient include:   Patient Care Team:  Kale Thompson MD as PCP - General    The following health maintenance items are reviewed in Epic and correct as of today:  Health Maintenance   Topic Date Due     PHQ-2 Q1 YR  10/26/1941     ADVANCE DIRECTIVE PLANNING Q5 YRS  10/26/1984     COLONOSCOPY Q5 YR  2016     FALL RISK ASSESSMENT  2016     TETANUS Q10 YR  2017     " "INFLUENZA VACCINE (Season Ended) 2018     LIPID MONITORING Q5 YEARS  2022     PNEUMOCOCCAL  Completed     {Chronicprobdata (Optional):435413}    {Decision Support (Optional):130119}    Review of Systems  {ROS COMP (Optional):220967}    OBJECTIVE:   There were no vitals taken for this visit. Estimated body mass index is 20.18 kg/(m^2) as calculated from the following:    Height as of 17: 5' 6\" (1.676 m).    Weight as of 17: 125 lb (56.7 kg).  Physical Exam  {Exam (Optional) :509498}    {Diagnostic Test Results (Optional):860156::\"Diagnostic Test Results:\",\"none \"}    ASSESSMENT / PLAN:   {Diag Picklist:739412}    End of Life Planning:  Patient currently has an advanced directive: { :779061}    COUNSELING:  {Medicare Counselin}    BP Readings from Last 1 Encounters:   17 102/62     Estimated body mass index is 20.18 kg/(m^2) as calculated from the following:    Height as of 17: 5' 6\" (1.676 m).    Weight as of 17: 125 lb (56.7 kg).    {BP Counseling- Complete if BP >= 120/80  (Optional):460220}  {Weight Management Plan (ACO) Complete if BMI is abnormal-  Ages 18-64  BMI >24.9.  Age 65+ with BMI <23 or >30 (Optional):858778}     reports that he has never smoked. He has never used smokeless tobacco.  {Tobacco Cessation -- Complete if patient is a smoker (Optional):533179}    Appropriate preventive services were discussed with this patient, including applicable screening as appropriate for cardiovascular disease, diabetes, osteopenia/osteoporosis, and glaucoma.  As appropriate for age/gender, discussed screening for colorectal cancer, prostate cancer, breast cancer, and cervical cancer. Checklist reviewing preventive services available has been given to the patient.    Reviewed patients plan of care and provided an AVS. The {CarePlan:997557} for Jessica meets the Care Plan requirement. This Care Plan has been established and reviewed with the {PATIENT, FAMILY MEMBER, " CAREGIVER:877739}.    Counseling Resources:  ATP IV Guidelines  Pooled Cohorts Equation Calculator  Breast Cancer Risk Calculator  FRAX Risk Assessment  ICSI Preventive Guidelines  Dietary Guidelines for Americans, 2010  USDA's MyPlate  ASA Prophylaxis  Lung CA Screening    Kale Thompson MD  [facility]

## 2018-06-29 NOTE — PROGRESS NOTES
"  SUBJECTIVE:   Jessica Mckeon is a 88 year old male who presents to clinic today for the following health issues:    Medication Followup    Taking Medication as prescribed: yes    Side Effects:  None    Medication Helping Symptoms:  yes       {additional problems for provider to add:992079}    Problem list and histories reviewed & adjusted, as indicated.  Additional history: {NONE - AS DOCUMENTED:043805::\"as documented\"}    {HIST REVIEW/ LINKS 2:158788}    Reviewed and updated as needed this visit by clinical staff       Reviewed and updated as needed this visit by Provider         {PROVIDER CHARTING PREFERENCE:865075}  "

## 2018-06-29 NOTE — LETTER
[facility]  [address]  [place] [address]                  [phone]   July 3, 2018    Jessica Mckeon  [address] [place] [address]      Dear Jessica,    Here is a summary of your recent test results:    1. Your TSH (thyroid function) is normal.     2. Your cholesterol profile looks great!     Your Lipid Goals:   Cholesterol: Desirable is less than 200, Borderline is 200-240   HDL (Good Cholesterol): Desirable is greater than 40   LDL (Bad Cholesterol): Desirable is less than 70, Borderline    Triglycerides (Fats in the Blood): Desirable is less than 150,  Borderline is 150-200     3. Your metabolic panel, including liver and kidney function, glucose (blood sugar) and electrolytes, is within expected limits. Your glucose level is slightly elevated but is not worrisome.     4. Your blood counts are good overall. Your hemoglobin (red blood count) is slightly low, but is similar to previous checks. This level should not cause any symptoms.     Your test results are enclosed.      Please contact me if you have any questions.           Thank you very much for choosing [facility]    Best regards,    Kale Thompson MD        Results for orders placed or performed in visit on 06/29/18   CBC with platelets   Result Value Ref Range    WBC 5.2 4.0 - 11.0 10e9/L    RBC Count 4.26 (L) 4.4 - 5.9 10e12/L    Hemoglobin 11.9 (L) 13.3 - 17.7 g/dL    Hematocrit 37.6 (L) 40.0 - 53.0 %    MCV 88 78 - 100 fl    MCH 27.9 26.5 - 33.0 pg    MCHC 31.6 31.5 - 36.5 g/dL    RDW 13.6 10.0 - 15.0 %    Platelet Count 163 150 - 450 10e9/L   TSH with free T4 reflex   Result Value Ref Range    TSH 3.11 0.40 - 4.00 mU/L   Lipid Profile (Chol, Trig, HDL, LDL calc)   Result Value Ref Range    Cholesterol 121 <200 mg/dL    Triglycerides 59 <150 mg/dL    HDL Cholesterol 57 >39 mg/dL    LDL Cholesterol Calculated 52 <100 mg/dL    Non HDL Cholesterol 64 <130 mg/dL   Comprehensive  metabolic panel   Result Value Ref Range    Sodium 142 133 - 144 mmol/L    Potassium 4.1 3.4 - 5.3 mmol/L    Chloride 105 94 - 109 mmol/L    Carbon Dioxide 30 20 - 32 mmol/L    Anion Gap 7 3 - 14 mmol/L    Glucose 107 (H) 70 - 99 mg/dL    Urea Nitrogen 25 7 - 30 mg/dL    Creatinine 1.18 0.66 - 1.25 mg/dL    GFR Estimate 58 (L) >60 mL/min/1.7m2    GFR Estimate If Black 70 >60 mL/min/1.7m2    Calcium 9.6 8.5 - 10.1 mg/dL    Bilirubin Total 0.3 0.2 - 1.3 mg/dL    Albumin 4.2 3.4 - 5.0 g/dL    Protein Total 7.6 6.8 - 8.8 g/dL    Alkaline Phosphatase 49 40 - 150 U/L    ALT 27 0 - 70 U/L    AST 35 0 - 45 U/L

## 2018-06-29 NOTE — PATIENT INSTRUCTIONS
If your fatigue continues, contact Dr. Kulkarni to be seen sooner than October      Preventive Health Recommendations:     Yearly exam:             See your health care provider every year in order to  o   Review health changes.   o   Discuss preventive care.    o   Review your medicines.    Every 1-2 years, have a diabetes test (fasting glucose).    Every year, have a cholesterol test.  Shots:     Get a flu shot each year.     Get a tetanus shot every 10 years.     Talk to your pharmacist about a shingles vaccine.   Nutrition:     Eat at least 5 servings of fruits and vegetables each day.     Eat whole-grain bread, whole-wheat pasta and brown rice instead of white grains and rice.     Get adequate Calcium and Vitamin D.   Lifestyle    Exercise for at least 150 minutes a week (30 minutes a day, 5 days a week). This will help you control your weight and prevent disease.     No smoking.     Wear sunscreen to prevent skin cancer.     See your dentist every six months for an exam and cleaning.     See your eye doctor every 1 to 2 years to screen for conditions such as glaucoma, macular degeneration and cataracts.

## 2018-06-29 NOTE — MR AVS SNAPSHOT
After Visit Summary   6/29/2018    Jessica Mckeon    MRN: [id number]           Patient Information     Date Of Birth          10/26/1929        Visit Information        Provider Department      6/29/2018 9:40 AM Kale Thompson MD; [employer] [facility]        Today's Diagnoses     Medicare annual wellness visit, subsequent    -  1    Fatigue due to excessive exertion, initial encounter        Hyperlipidemia LDL goal <70          Care Instructions    If your fatigue continues, contact Dr. Kulkarni to be seen sooner than October      Preventive Health Recommendations:     Yearly exam:             See your health care provider every year in order to  o   Review health changes.   o   Discuss preventive care.    o   Review your medicines.    Every 1-2 years, have a diabetes test (fasting glucose).    Every year, have a cholesterol test.  Shots:     Get a flu shot each year.     Get a tetanus shot every 10 years.     Talk to your pharmacist about a shingles vaccine.   Nutrition:     Eat at least 5 servings of fruits and vegetables each day.     Eat whole-grain bread, whole-wheat pasta and brown rice instead of white grains and rice.     Get adequate Calcium and Vitamin D.   Lifestyle    Exercise for at least 150 minutes a week (30 minutes a day, 5 days a week). This will help you control your weight and prevent disease.     No smoking.     Wear sunscreen to prevent skin cancer.     See your dentist every six months for an exam and cleaning.     See your eye doctor every 1 to 2 years to screen for conditions such as glaucoma, macular degeneration and cataracts.          Follow-ups after your visit        Your next 10 appointments already scheduled     Oct 02, 2018  9:45 AM CDT   LAB with RU LAB   [facility] [place] ([facility])    [address] [address] [address] [phone]           Please do not eat  "10-12 hours before your appointment if you are coming in fasting for labs on lipids, cholesterol, or glucose (sugar). This does not apply to pregnant women. Water, hot tea and black coffee (with nothing added) are okay. Do not drink other fluids, diet soda or chew gum.            Oct 02, 2018 10:45 AM CDT   Return Visit with Patric Kulkarni MD   [facility] ([facility])    [address]  [facility] [address] [phone]              Who to contact     If you have questions or need follow up information about today's clinic visit or your schedule please contact [facility] directly at [phone].  Normal or non-critical lab and imaging results will be communicated to you by MyChart, letter or phone within 4 business days after the clinic has received the results. If you do not hear from us within 7 days, please contact the clinic through MyChart or phone. If you have a critical or abnormal lab result, we will notify you by phone as soon as possible.  Submit refill requests through [employer] or call your pharmacy and they will forward the refill request to us. Please allow 3 business days for your refill to be completed.          Additional Information About Your Visit        [employer] Information     [employer] lets you send messages to your doctor, view your test results, renew your prescriptions, schedule appointments and more. To sign up, go to [email or web address] . Click on \"Log in\" on the left side of the screen, which will take you to the Welcome page. Then click on \"Sign up Now\" on the right side of the page.     You will be asked to enter the access code listed below, as well as some personal information. Please follow the directions to create your username and password.     Your access code is: [email or web address]  Expires: 2018 10:18 AM     Your access code will  in 90 days. If you need help or a new code, " "please call your [facility] clinic or [phone].        Care EveryWhere ID     This is your Care EveryWhere ID. This could be used by other organizations to access your [facility] medical records  [id number]        Your Vitals Were     Pulse Respirations Height Pulse Oximetry BMI (Body Mass Index)       69 14 5' 6\" (1.676 m) 97% 20.98 kg/m2        Blood Pressure from Last 3 Encounters:   06/29/18 164/64   11/13/17 102/62   11/07/17 140/70    Weight from Last 3 Encounters:   06/29/18 130 lb (59 kg)   11/13/17 125 lb (56.7 kg)   11/07/17 127 lb (57.6 kg)              We Performed the Following     CBC with platelets     Comprehensive metabolic panel     Lipid Profile (Chol, Trig, HDL, LDL calc)     TSH with free T4 reflex        Primary Care Provider Office Phone # Fax #    Kale Thompson [phone] [address] [place] [address]        Equal Access to Services     [facility]: Hadii aad ku hadasho Soomaali, waaxda luqadaha, qaybta kaalmada adeegyada, waxay idiin haybriann pierre mosqueda . So [facility] [phone].    ATENCIÓN: Si habla español, tiene a hylton disposición servicios gratuitos de asistencia lingüística. [facility] [phone].    We comply with applicable federal civil rights laws and [place] laws. We do not discriminate on the basis of race, color, national origin, age, disability, sex, sexual orientation, or gender identity.            Thank you!     Thank you for choosing [facility]  for your care. Our goal is always to provide you with excellent care. Hearing back from our patients is one way we can continue to improve our services. Please take a few minutes to complete the written survey that you may receive in the mail after your visit with us. Thank you!             Your Updated Medication List - Protect others around you: Learn how to safely use, store and throw away your medicines at www.disposemymeds.org.          This list is accurate as of " 6/29/18 10:18 AM.  Always use your most recent med list.                   Brand Name Dispense Instructions for use Diagnosis    alfuzosin 10 MG 24 hr tablet    UROXATRAL    90 tablet    TAKE ONE TABLET BY MOUTH ONCE DAILY    Essential hypertension with goal blood pressure less than 140/90       amLODIPine 5 MG tablet    NORVASC    90 tablet    Take 1 tablet (5 mg) by mouth daily    Coronary artery disease involving native coronary artery of native heart without angina pectoris, Essential hypertension with goal blood pressure less than 140/90       aspirin 81 MG tablet      Take 81 mg by mouth daily        CALCIUM 600 + D PO      With magnesium and zinc        finasteride 5 MG tablet    PROSCAR    90 tablet    TAKE ONE TABLET BY MOUTH ONCE DAILY    Benign prostatic hyperplasia       fluticasone 50 MCG/ACT spray    FLONASE    16 g    Spray 1-2 sprays into both nostrils daily    Chronic rhinitis       hydrocortisone 25 MG Suppository    ANUSOL-HC     Place 25 mg rectally        LANSOPRAZOLE PO      Take 30 mg by mouth daily        LIPITOR 40 MG tablet   Generic drug:  atorvastatin     90 tablet    Take 1 tablet (40 mg) by mouth daily    Pure hypercholesterolemia       lisinopril 10 MG tablet    PRINIVIL/ZESTRIL    90 tablet    Take 1 tablet (10 mg) by mouth daily    Coronary artery disease involving native coronary artery of native heart without angina pectoris, Essential hypertension with goal blood pressure less than 140/90       metoprolol succinate 25 MG 24 hr tablet    TOPROL-XL    45 tablet    Take 0.5 tablets (12.5 mg) by mouth daily    ASCVD (arteriosclerotic cardiovascular disease), Essential hypertension       metoprolol tartrate 25 MG tablet    LOPRESSOR     Take 25 mg by mouth        * order for DME     2 each    Jobst Stockings. Knee high. Medium compression (20-30 mmHg). Dispense 2 pairs.    Coronary artery disease involving native coronary artery of native heart without angina pectoris, Bilateral edema  of lower extremity       * order for DME     1 each    Auto-CPAP, setting 5-15 cm H2O. Sleep study 10/2016. RDI 24.7. AHI 15.5. REM AHI 37.6.    NED (obstructive sleep apnea)       * order for DME     1 each    CPAP tubing, mask and necessary supplies    NED (obstructive sleep apnea)       pantoprazole 40 MG EC tablet    PROTONIX     Take 40 mg by mouth        RABEprazole 20 MG EC tablet    ACIPHEX    90 tablet    TAKE ONE TABLET BY MOUTH ONCE DAILY    Gastroesophageal reflux disease without esophagitis       rOPINIRole 0.5 MG tablet    REQUIP    90 tablet    TAKE ONE TABLET BY MOUTH AT BEDTIME    Restless leg syndrome       rosuvastatin 20 MG tablet    CRESTOR    90 tablet    TAKE ONE TABLET BY MOUTH ONCE DAILY FOR CHOLESTEROL    Hyperlipidemia LDL goal <70       sennosides 8.6 MG tablet    SENOKOT    120 each    Take 1 tablet by mouth as needed        tolterodine 4 MG 24 hr capsule    DETROL LA    90 capsule    TAKE ONE CAPSULE BY MOUTH AT BEDTIME    Benign prostatic hyperplasia with nocturia       traMADol 50 MG tablet    ULTRAM          * Notice:  This list has 3 medication(s) that are the same as other medications prescribed for you. Read the directions carefully, and ask your doctor or other care provider to review them with you.

## 2018-07-05 ENCOUNTER — TELEPHONE (OUTPATIENT)
Dept: PALLIATIVE MEDICINE | Facility: CLINIC | Age: 83
End: 2018-07-05

## 2018-07-05 NOTE — TELEPHONE ENCOUNTER
Pre-screening Questions for Radiology Injections:    Injection to be done at which interventional clinic site? [facility], instruct patient to arrive 30 minutes before the scheduled appointment time.     Procedure ordered by ELLA    Procedure ordered? Lumbar Epidural Steroid Injection    What insurance would patient like us to bill for this procedure? BCBS      Worker's comp or MVA (motor vehicle accident) -Any injection DO NOT SCHEDULE and route to Chio Starks.      [employer] - For SI joint injections, DO NOT SCHEDULE and route Tonia Ding. [employer] FREEDOM NO PA REQUIRED EFFECTIVE 11/1/2017      [employer]- MBB's must be scheduled at LEAST two weeks apart      [employer] - Any injection besides hip/shoulder/knee joint DO NOT SCHEDULE and route to Tonia Timur. She will obtain PA and call pt back to schedule procedure or notify pt of denial.       [employer]-Route to Tonia for review    Any chance of pregnancy? NO   If YES, do NOT schedule and route to RN pool    Is an  needed? Yes - FAR     Patient has a drive home? (mandatory) YES:     Is patient taking any blood thinners (plavix, coumadin, jantoven, warfarin, heparin, pradaxa or dabigatran )? No   If hold needed, do NOT schedule, route to RN pool     Is patient taking any aspirin products? Yes - Pt takes 81mg daily; instructed to hold 0 day(s) prior to procedure.      If more than 325mg/day do NOT schedule; route to RN pool     For CERVICAL procedures, hold all aspirin products for 6 days.      Does the patient have a bleeding or clotting disorder? No     If YES, okay to schedule AND route to RN nurse pool    **For any patients with platelet count <100, must be forwarded to provider**    Is patient diabetic?  No  If YES, have them bring their glucometer.    Does patient have an active infection or treated for one within the past week? No     Is patient currently taking any antibiotics?  No     For  patients on chronic, preventative, or prophylactic antibiotics, procedures may be scheduled.     For patients on antibiotics for active or recent infection:    Jessica Betts Burton, [place]-antibiotic course must have been completed for 4 days    Is patient currently taking any steroid medications? (i.e. Prednisone, Medrol)  No     For patients on steroid medications:    Jessica Betts Burton, [place]-steroid course must have been completed for 4 days    Reviewed with patient:  If you are started on any steroids or antibiotics between now and your appointment, you must contact us because it may affect our ability to perform your procedure.  Yes    Is patient actively being treated for cancer or immunocompromised? No  If YES, do NOT schedule and route to RN pool     Are you able to get on and off an exam table with minimal or no assistance? Yes  If NO, do NOT schedule and route to RN pool    Are you able to roll over and lay on your stomach with minimal or no assistance? Yes  If NO, do NOT schedule and route to RN pool     Any allergies to contrast dye, iodine, shellfish, or numbing and steroid medications? No  If YES, route to RN pool AND add allergy information to appointment notes    Allergies: Review of patient's allergies indicates no known allergies.      Has the patient had a flu shot or any other vaccinations within 7 days before or after the procedure.  No     Does patient have an MRI/CT?  YES:   (SI joint, hip injections, lumbar sympathetic blocks, and stellate ganglion blocks do not require an MRI)    Was the MRI done w/in the last 3 years?  Yes    Was MRI done at [facility]? Yes      If not, where was it done? N/A       If MRI was not done at [facility], [facility] or [facility] do NOT schedule and route to nursing.  If pt has an imaging disc, the injection may be scheduled but pt has to bring disc to appt. If they show up w/out disc the injection cannot be done    Reminders (please tell  patient if applicable):       Instructed pt to arrive 30 minutes early for IV start if this is for a cervical procedure, ALL sympathetic (stellate ganglion, hypogastric, or lumbar sympathetic block) and all sedation procedures (RFA, spinal cord stimulation trials).  Not Applicable   -IVs are not routinely placed for Dr. Amado cervical cases   -Dr. Roque: IVs for cervical ESIs and cervical TBDs (not CMBBs/facet inj)      If NPO for sedation, informed patient that it is okay to take medications with sips of water (except if they are to hold blood thinners).  Not Applicable   *DO take blood pressure medication if it is prescribed*      If this is for a cervical LISET, informed patient that aspirin needs to be held for 6 days.   Not Applicable      For all patients not having spinal cord stimulator (SCS) trials or radiofrequency ablations (RFAs), informed patient:    IV sedation is not provided for this procedure.  If you feel that an oral anti-anxiety medication is needed, you can discuss this further with your referring provider or primary care provider.  The Pain Clinic provider will discuss specifics of what the procedure includes at your appointment.  Most procedures last 10-20 minutes.  We use numbing medications to help with any discomfort during the procedure.  Not Applicable      Do not schedule procedures requiring IV placement in the first appointment of the day or first appointment after lunch. Do NOT schedule at 0745, 0815 or 1245.       For patients 85 or older we recommend having an adult stay w/ them for the remainder of the day.       Does the patient have any questions?    Tonia Ding  [facility]

## 2018-07-16 NOTE — PROGRESS NOTES
[facility] - Procedure Note    Date of Service: 7/18/2018  Procedure performed: Right sacroiliac joint injection   Diagnosis: Sacroiliac joint pain  : Lola Amado MD   Anesthesia: none    Indications: Jessica Mckeon is a 88 year old male who is seen at the request of Dr. Kale Thompson for a sacroiliac joint injection. The patient describes right sided low back pain radiating down the back of his right leg to his foot.  I was planning to do a caudal LISET, however, he had an injection a year ago which provided good pain control until now. He would like this same injection repeated. Exam shows full strength and sensation in both lower extremities, tenderness of the sacroiliac (SI) joint, and positive FABERE on the right. The patient reports minimal improvement with conservative treatment, including previous injections, previous surgery 1997, PT and medications.    This is a repeat injection.  Previous Right SI joint injection done by [facility] over a year ago provided good pain relief for a period of 12 months.     MRI was done on 5/03/2017 which showed   FINDINGS: Five lumbar vertebrae are assumed. Degenerative disc disease  is noted throughout the lumbar spine, relatively sparing L5-S1, and  most advanced at L4-L5 where there is complete loss of disc height.  Vertebral body heights and sagittal alignment are within normal  limits. The conus medullaris is unremarkable in appearance on the  sagittal images.      T12-L1: Minimal central disc protrusion. No central or foraminal  stenosis.     L1-L2: Diffuse annular bulge and superimposed 0.5 cm central disc  extrusion or herniation. This, in combination with ligamentum flavum  thickening, results in severe central stenosis, the AP diameter of the  thecal sac measuring approximately 0.2 cm. Mild bilateral foraminal  stenosis.     L2-L3: Broad-based central disc extrusion and ligamentum flavum  thickening resulting in severe central  stenosis, the AP diameter of  thecal sac measuring 0.4 cm at the midline. Mild bilateral foraminal  stenosis.     L3-L4: Diffuse annular bulge, ligamentum flavum thickening, and  prominence of the dorsal epidural fat resulting in moderate central  stenosis, the AP diameter of the thecal sac measuring 0.6 cm at  midline. Dural ectasia or perineural cyst is noted, larger on the  left, where there is scalloping or erosion in the posterior inferior  aspect of the L3 vertebral body. Moderate bilateral foraminal stenosis  is noted.     L4-L5: Previous posterior decompression. No central stenosis. Lateral  endplate spurring and loss of height resulting in moderate to severe  bilateral foraminal stenosis.     L5-S1: Previous posterior decompression. No central stenosis. There is  a small 0.3 cm synovial cyst in the dorsolateral aspect of the left  neural foramen. Mild to moderate left foraminal stenosis. The right  neural foramen is widely patent.       IMPRESSION:  1. 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 Posterior decompressions are noted at L4-L5 and L5-S1 as  well.  2. Multilevel foraminal stenosis as above. A small synovial cyst is  noted in the dorsolateral aspect of the left L5 neural foramen.    Allergies:    No Known Allergies     Vitals:  /80  Pulse 71  SpO2 98%    Options/alternatives, benefits and risks were discussed with the patient including bleeding, infection, tissue trauma, exposure to radiation, reaction to medications including seizure, nerve injury, weakness, and numbness.  Questions were answered to his satisfaction and he agrees to proceed. Voluntary informed consent was obtained and signed.     Procedure:  After getting informed consent, patient was brought into the procedure suite and was placed in a prone position on the procedure table.   A Pause for the Cause was performed.  Patient was prepped and draped in sterile fashion.     After identifying  the right SI joint, the C-arm was rotated to a obliquely to obtain the best view of the inferior angle of the joint.  A total of 2 ml of Lidocaine 1%  was used to anesthetize the skin at a skin entry site coaxial with the fluoroscopy beam at this location.  A 22 gauge 3.5 inch needle was advanced under intermittent fluoroscopy until it was felt to enter the SI joint.    A total of 1 ml of Omnipaque-300 was injected, confirming appropriate position, with spread into the intraarticular space, with no intravascular uptake noted.  9 ml of Omnipaque-300 was wasted. Location was verified in lateral view.    1 ml of 1% lidocaine, 1 ml of 0.5% bupivacaine with 40 mg of kenalog was injected.  The needle was removed. Hemostasis was achieved, the area was cleaned, and bandaids were placed when appropriate.  The patient tolerated the procedure well, and was taken to the recovery room.    Images were saved to PACS.    Pre-procedure pain score: 8/10  Post-procedure pain score: 0/10  Following today's procedure, the patient was advised to contact the [facility] for any of the following:   Fever, chills, or night sweats   New onset of pain, numbness, or weakness   Any questions/concerns regarding the procedure    -f/u with the referring provider  -Could consider caudal LISET if he does not get pain relief from todays injection      Lola Amado MD   [facility]

## 2018-07-18 ENCOUNTER — RADIOLOGY INJECTION OFFICE VISIT (OUTPATIENT)
Dept: PALLIATIVE MEDICINE | Facility: CLINIC | Age: 83
End: 2018-07-18
Attending: INTERNAL MEDICINE
Payer: COMMERCIAL

## 2018-07-18 ENCOUNTER — RADIANT APPOINTMENT (OUTPATIENT)
Dept: GENERAL RADIOLOGY | Facility: CLINIC | Age: 83
End: 2018-07-18
Attending: ANESTHESIOLOGY
Payer: COMMERCIAL

## 2018-07-18 VITALS — OXYGEN SATURATION: 98 % | HEART RATE: 71 BPM | SYSTOLIC BLOOD PRESSURE: 135 MMHG | DIASTOLIC BLOOD PRESSURE: 80 MMHG

## 2018-07-18 DIAGNOSIS — M46.1 SACROILIITIS (H): Primary | ICD-10-CM

## 2018-07-18 DIAGNOSIS — M54.16 LUMBAR RADICULOPATHY: ICD-10-CM

## 2018-07-18 DIAGNOSIS — M53.3 SI (SACROILIAC) JOINT DYSFUNCTION: ICD-10-CM

## 2018-07-18 PROCEDURE — 27096 INJECT SACROILIAC JOINT: CPT | Mod: RT | Performed by: ANESTHESIOLOGY

## 2018-07-18 NOTE — PATIENT INSTRUCTIONS
[facility] Procedure Discharge Instructions    Today you saw:   Dr. Lola Amado           Your procedure: Right Sacro-iliac joint injection    Medications used:  Lidocaine (anesthetic)  Bupivacaine (anesthetic) Kenalog (steroid)  Omnipaque (contrast)              Be cautious when walking as numbness and/or weakness in the legs may occur up to 6-8 hours after the procedure due to effect of the local anesthetic        The effect of the local anesthetic could slow your reflexes.     Avoid strenuous activity for the first 24 hours. You may resume your regular activities after that.     You may shower, however avoid swimming, tub baths or hot tubs for 24 hours following your procedure    You may have a mild to moderate increase in pain for several days following the injection.      You may use ice packs for 10-15 minutes, 3 to 4 times a day at the injection site for comfort    Do not use heat to painful areas for 6 to 8 hours. This will give the local anesthetic time to wear off and prevent you from accidentally burning your skin.    You may use anti-inflammatory medications (such as Ibuprofen/Advil or Aleve) or Tylenol for pain control if necessary    With diabetes, check your blood sugar more frequently than usual as your blood sugar may be higher than normal for 10-14 days following a steroid injection. Contact your doctor who manages your diabetes if your blood sugar is higher than usual    It may take up to 14 days for the steroid medication to start working although you may feel the effect as early as a few days after the procedure.     Follow up with your referring provider in 2-3 weeks      If you experience any of the following, call the pain center line during work hours at [phone] or on-call physician after hours at [phone]:  -Fever over 100 degree F  -Swelling, bleeding, redness, drainage, warmth at the injection site  -Progressive weakness or numbness in your legs  -Unusual new onset of  pain that is not improving    Phone #s:  Nurse triage line for general questions: [phone]

## 2018-07-18 NOTE — MR AVS SNAPSHOT
After Visit Summary   7/18/2018    Jessica Mckeon    MRN: [id number]           Patient Information     Date Of Birth          10/26/1929        Visit Information        Provider Department      7/18/2018 10:30 AM Lola Amado MD; [employer] [facility]        Care Instructions    [facility] Procedure Discharge Instructions    Today you saw:   Dr. Lola Amado           Your procedure: Right Sacro-iliac joint injection    Medications used:  Lidocaine (anesthetic)  Bupivacaine (anesthetic) Kenalog (steroid)  Omnipaque (contrast)              Be cautious when walking as numbness and/or weakness in the legs may occur up to 6-8 hours after the procedure due to effect of the local anesthetic        The effect of the local anesthetic could slow your reflexes.     Avoid strenuous activity for the first 24 hours. You may resume your regular activities after that.     You may shower, however avoid swimming, tub baths or hot tubs for 24 hours following your procedure    You may have a mild to moderate increase in pain for several days following the injection.      You may use ice packs for 10-15 minutes, 3 to 4 times a day at the injection site for comfort    Do not use heat to painful areas for 6 to 8 hours. This will give the local anesthetic time to wear off and prevent you from accidentally burning your skin.    You may use anti-inflammatory medications (such as Ibuprofen/Advil or Aleve) or Tylenol for pain control if necessary    With diabetes, check your blood sugar more frequently than usual as your blood sugar may be higher than normal for 10-14 days following a steroid injection. Contact your doctor who manages your diabetes if your blood sugar is higher than usual    It may take up to 14 days for the steroid medication to start working although you may feel the effect as early as a few days after the procedure.     Follow up with your referring provider  in 2-3 weeks      If you experience any of the following, call the pain center line during work hours at [phone] or on-call physician after hours at [phone]:  -Fever over 100 degree F  -Swelling, bleeding, redness, drainage, warmth at the injection site  -Progressive weakness or numbness in your legs  -Unusual new onset of pain that is not improving    Phone #s:  Nurse triage line for general questions: [phone]          Follow-ups after your visit        Your next 10 appointments already scheduled     Oct 02, 2018  9:45 AM CDT   LAB with RU LAB   [facility] [place] ([email or web address])    [address]  [facility] [address] [phone]           Please do not eat 10-12 hours before your appointment if you are coming in fasting for labs on lipids, cholesterol, or glucose (sugar). This does not apply to pregnant women. Water, hot tea and black coffee (with nothing added) are okay. Do not drink other fluids, diet soda or chew gum.            Oct 02, 2018 10:45 AM CDT   Return Visit with Patric Kulkarni MD   [facility] ([email or web address] Clin[facility])    [address]  [facility] [address] [phone]              Who to contact     If you have questions or need follow up information about today's clinic visit or your schedule please contact [facility] directly at [phone].  Normal or non-critical lab and imaging results will be communicated to you by MyChart, letter or phone within 4 business days after the clinic has received the results. If you do not hear from us within 7 days, please contact the clinic through [email or web address] or phone. If you have a critical or abnormal lab result, we will notify you by phone as soon as possible.  Submit refill requests through CadenceMD or call your pharmacy and they will forward the refill request to us. Please allow 3 business days for  "your refill to be completed.          Additional Information About Your Visit        MyChart Information     Right Skills lets you send messages to your doctor, view your test results, renew your prescriptions, schedule appointments and more. To sign up, go to [email or web address] . Click on \"Log in\" on the left side of the screen, which will take you to the Welcome page. Then click on \"Sign up Now\" on the right side of the page.     You will be asked to enter the access code listed below, as well as some personal information. Please follow the directions to create your username and password.     Your access code is: [email or web address]  Expires: 2018 10:18 AM     Your access code will  in 90 days. If you need help or a new code, please call your [place] clinic or [phone].        Care EveryWhere ID     This is your Care EveryWhere ID. This could be used by other organizations to access your [place] medical records  [id number]        Your Vitals Were     Pulse Pulse Oximetry                68 96%           Blood Pressure from Last 3 Encounters:   18 126/67   18 164/64   17 102/62    Weight from Last 3 Encounters:   18 59 kg (130 lb)   17 56.7 kg (125 lb)   17 57.6 kg (127 lb)              Today, you had the following     No orders found for display       Primary Care Provider Office Phone # Fax #    Kale Thompson [phone] [address] [place] [address]        Equal Access to Services     [facility]: Hadii courtney rossi hadasho Soomaali, waaxda luqadaha, qaybta kaalmada ye, dana mosqueda . So [facility] [phone].    ATENCIÓN: Si habla español, tiene a hylton disposición servicios gratuitos de asistencia lingüística. Llame al [phone].    We comply with applicable federal civil rights laws and [place] laws. We do not discriminate on the basis of race, color, national origin, age, disability, sex, sexual " orientation, or gender identity.            Thank you!     Thank you for choosing [facility]  for your care. Our goal is always to provide you with excellent care. Hearing back from our patients is one way we can continue to improve our services. Please take a few minutes to complete the written survey that you may receive in the mail after your visit with us. Thank you!             Your Updated Medication List - Protect others around you: Learn how to safely use, store and throw away your medicines at www.disposemymeds.org.          This list is accurate as of 7/18/18 11:06 AM.  Always use your most recent med list.                   Brand Name Dispense Instructions for use Diagnosis    alfuzosin 10 MG 24 hr tablet    UROXATRAL    90 tablet    TAKE ONE TABLET BY MOUTH ONCE DAILY    Essential hypertension with goal blood pressure less than 140/90       amLODIPine 5 MG tablet    NORVASC    90 tablet    Take 1 tablet (5 mg) by mouth daily    Coronary artery disease involving native coronary artery of native heart without angina pectoris, Essential hypertension with goal blood pressure less than 140/90       aspirin 81 MG tablet      Take 81 mg by mouth daily        CALCIUM 600 + D PO      With magnesium and zinc        finasteride 5 MG tablet    PROSCAR    90 tablet    TAKE ONE TABLET BY MOUTH ONCE DAILY    Benign prostatic hyperplasia       fluticasone 50 MCG/ACT spray    FLONASE    16 g    Spray 1-2 sprays into both nostrils daily    Chronic rhinitis       hydrocortisone 25 MG Suppository    ANUSOL-HC     Place 25 mg rectally        LANSOPRAZOLE PO      Take 30 mg by mouth daily        LIPITOR 40 MG tablet   Generic drug:  atorvastatin     90 tablet    Take 1 tablet (40 mg) by mouth daily    Pure hypercholesterolemia       lisinopril 10 MG tablet    PRINIVIL/ZESTRIL    90 tablet    Take 1 tablet (10 mg) by mouth daily    Coronary artery disease involving native coronary artery of native heart without  angina pectoris, Essential hypertension with goal blood pressure less than 140/90       metoprolol succinate 25 MG 24 hr tablet    TOPROL-XL    45 tablet    Take 0.5 tablets (12.5 mg) by mouth daily    ASCVD (arteriosclerotic cardiovascular disease), Essential hypertension       metoprolol tartrate 25 MG tablet    LOPRESSOR     Take 25 mg by mouth        * order for DME     2 each    Jobst Stockings. Knee high. Medium compression (20-30 mmHg). Dispense 2 pairs.    Coronary artery disease involving native coronary artery of native heart without angina pectoris, Bilateral edema of lower extremity       * order for DME     1 each    Auto-CPAP, setting 5-15 cm H2O. Sleep study 10/2016. RDI 24.7. AHI 15.5. REM AHI 37.6.    NED (obstructive sleep apnea)       * order for DME     1 each    CPAP tubing, mask and necessary supplies    NED (obstructive sleep apnea)       pantoprazole 40 MG EC tablet    PROTONIX     Take 40 mg by mouth        RABEprazole 20 MG EC tablet    ACIPHEX    90 tablet    TAKE ONE TABLET BY MOUTH ONCE DAILY    Gastroesophageal reflux disease without esophagitis       rOPINIRole 0.5 MG tablet    REQUIP    90 tablet    TAKE ONE TABLET BY MOUTH AT BEDTIME    Restless leg syndrome       rosuvastatin 20 MG tablet    CRESTOR    90 tablet    TAKE ONE TABLET BY MOUTH ONCE DAILY FOR CHOLESTEROL    Hyperlipidemia LDL goal <70       sennosides 8.6 MG tablet    SENOKOT    120 each    Take 1 tablet by mouth as needed        tolterodine 4 MG 24 hr capsule    DETROL LA    90 capsule    TAKE ONE CAPSULE BY MOUTH AT BEDTIME    Benign prostatic hyperplasia with nocturia       traMADol 50 MG tablet    ULTRAM          * Notice:  This list has 3 medication(s) that are the same as other medications prescribed for you. Read the directions carefully, and ask your doctor or other care provider to review them with you.

## 2018-07-18 NOTE — NURSING NOTE
Discharge Information    IV Discontiued Time:  NA    Amount of Fluid Infused:  NA    Discharge Criteria = When patient returns to baseline or as per MD order    Consciousness:  Pt is fully awake    Circulation:  BP +/- 20% of pre-procedure level    Respiration:  Patient is able to breathe deeply    O2 Sat:  Patient is able to maintain O2 Sat >92% on room air    Activity:  Moves 4 extremities on command    Ambulation:  Patient is able to stand and walk or stand and pivot into wheelchair    Dressing:  Clean/dry or No Dressing    Notes:   Discharge instructions and AVS given to patient    Patient meets criteria for discharge?  YES    Admitted to PCU?  No    Responsible adult present to accompany patient home?  Yes    Signature/Title:    Lacey Barth RN Care Coordinator  [facility]

## 2018-07-18 NOTE — Clinical Note
I was planning to do a caudal LISET, however, he insisted that his previous right SI joint injection done by [facility] over a year ago took care of his low back AND radiating leg pain for over a year.   He requested that this be repeated.

## 2018-07-24 ENCOUNTER — TRANSFERRED RECORDS (OUTPATIENT)
Dept: HEALTH INFORMATION MANAGEMENT | Facility: CLINIC | Age: 83
End: 2018-07-24

## 2018-08-14 ENCOUNTER — TRANSFERRED RECORDS (OUTPATIENT)
Dept: HEALTH INFORMATION MANAGEMENT | Facility: CLINIC | Age: 83
End: 2018-08-14

## 2018-09-10 ENCOUNTER — OFFICE VISIT (OUTPATIENT)
Dept: OPTOMETRY | Facility: CLINIC | Age: 83
End: 2018-09-10
Payer: COMMERCIAL

## 2018-09-10 DIAGNOSIS — Z96.1 PSEUDOPHAKIA: ICD-10-CM

## 2018-09-10 DIAGNOSIS — H52.203 HYPEROPIA WITH ASTIGMATISM AND PRESBYOPIA, BILATERAL: Primary | ICD-10-CM

## 2018-09-10 DIAGNOSIS — H01.004 BLEPHARITIS OF UPPER EYELIDS OF BOTH EYES, UNSPECIFIED TYPE: ICD-10-CM

## 2018-09-10 DIAGNOSIS — H52.03 HYPEROPIA WITH ASTIGMATISM AND PRESBYOPIA, BILATERAL: Primary | ICD-10-CM

## 2018-09-10 DIAGNOSIS — H04.123 DRY EYES: ICD-10-CM

## 2018-09-10 DIAGNOSIS — H01.001 BLEPHARITIS OF UPPER EYELIDS OF BOTH EYES, UNSPECIFIED TYPE: ICD-10-CM

## 2018-09-10 DIAGNOSIS — H52.4 HYPEROPIA WITH ASTIGMATISM AND PRESBYOPIA, BILATERAL: Primary | ICD-10-CM

## 2018-09-10 PROCEDURE — 92015 DETERMINE REFRACTIVE STATE: CPT | Performed by: OPTOMETRIST

## 2018-09-10 PROCEDURE — 92014 COMPRE OPH EXAM EST PT 1/>: CPT | Performed by: OPTOMETRIST

## 2018-09-10 ASSESSMENT — REFRACTION_MANIFEST
OD_SPHERE: -1.00
OS_AXIS: 180
OS_CYLINDER: +2.50
OD_CYLINDER: +2.50
OD_AXIS: 006
OS_SPHERE: -1.50

## 2018-09-10 ASSESSMENT — REFRACTION_WEARINGRX
OS_AXIS: 180
OS_CYLINDER: +1.75
OS_SPHERE: -1.25
OD_AXIS: 165
OS_ADD: +2.50
OD_ADD: +2.50
OD_CYLINDER: +2.50
OD_SPHERE: -1.00
SPECS_TYPE: BIFOCAL

## 2018-09-10 ASSESSMENT — EXTERNAL EXAM - LEFT EYE: OS_EXAM: MULTIPLE NEVI

## 2018-09-10 ASSESSMENT — VISUAL ACUITY
OS_SC: 20/200
OS_SC+: -1
CORRECTION_TYPE: GLASSES
OS_CC: 20/80
OD_SC: 20/150
OS_CC+: -1
OD_CC: 20/20
METHOD: SNELLEN - LINEAR
OS_SC: 20/125
OD_CC: 20/40 -1
OD_SC: 20/200
OS_CC: 20/30

## 2018-09-10 ASSESSMENT — TONOMETRY
OS_IOP_MMHG: 16
OD_IOP_MMHG: 16
IOP_METHOD: APPLANATION

## 2018-09-10 ASSESSMENT — CUP TO DISC RATIO
OS_RATIO: 0.4
OD_RATIO: 0.45

## 2018-09-10 ASSESSMENT — CONF VISUAL FIELD
OS_NORMAL: 1
OD_NORMAL: 1

## 2018-09-10 NOTE — PROGRESS NOTES
Chief Complaint   Patient presents with     COMPREHENSIVE EYE EXAM      Accompanied by   Difficulty with small print with glasses     Last Eye Exam: 1 year ago  Dilated Previously: Yes    What are you currently using to see?  glasses       Distance Vision Acuity: Satisfied with vision    Near Vision Acuity: Not satisfied     Eye Comfort: watery and burning both eyes  Do you use eye drops? : No  Used ointment after last year and then discontinued , does do lid hygiene with regular soap  Occupation or Hobbies: retired    Christiane Rodriguez, Optometric Tech      Final Rx        Sphere Cylinder Axis Add   Right -0.75 +2.50 166 +2.50   Left -1.25 +1.75 007 +2.50        Type: Bifocal     Expiration Date: 6/17/2019         Medical, surgical and family histories reviewed and updated 9/10/2018.       OBJECTIVE: See Ophthalmology exam    ASSESSMENT:    ICD-10-CM    1. Hyperopia with astigmatism and presbyopia, bilateral H52.03     H52.203     H52.4    2. 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 Dry eyes H04.123    4. Pseudophakia Z96.1         PLAN:   Continue with lid hygiene and artificial tears      Anila Lehman OD

## 2018-09-10 NOTE — PATIENT INSTRUCTIONS
Overabundance of bacterial microorganisms along the eyelashes and lid margins induce stress on the tear film and promote inflammation.  Regular lid hygiene helps diminish the bacterial population to prevent inflammation and infection.  Use a warm compress to loosen crusts   Cleanse lids once daily with a lid cleansing product as directed such as Ocusoft or Sterilid which can be purchased at most pharmacies. Diluted baby shampoo will also work, but not as well.     I recommend using artificial tears for your dry eye. There are over the counter drops that work well and may be used up to 4 x daily. ( systane , refresh, thera tears) If you need more than 4 drops daily, use a preservative free product which come in individual vials and may be used for 24 hours until finished and discarded.   No change needed in prescription

## 2018-09-10 NOTE — LETTER
9/10/2018         RE: Jessica Mckeon  [address] [place] [address]        Dear Colleague,    [facility] you [facility]keon[facility]. Please see a copy of my visit note below.    Chief Complaint   Patient presents with     COMPREHENSIVE EYE EXAM      Accompanied by   Difficulty with small print with glasses     Last Eye Exam: 1 year ago  Dilated Previously: Yes    What are you currently using to see?  glasses       Distance Vision Acuity: Satisfied with vision    Near Vision Acuity: Not satisfied     Eye Comfort: watery and burning both eyes  Do you use eye drops? : No  Used ointment after last year and then discontinued , does do lid hygiene with regular soap  Occupation or Hobbies: retired    Christiane Rordiguez, Optometric Tech      Final Rx        Sphere Cylinder Axis Add   Right -0.75 +2.50 166 +2.50   Left -1.25 +1.75 007 +2.50        Type: Bifocal     Expiration Date: 6/17/2019         Medical, surgical and family histories reviewed and updated 9/10/2018.       OBJECTIVE: See Ophthalmology exam    ASSESSMENT:    ICD-10-CM    1. Hyperopia with astigmatism and presbyopia, bilateral H52.03     H52.203     H52.4    2. 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 Dry eyes H04.123    4. Pseudophakia Z96.1         PLAN:   Continue with lid hygiene and artificial tears      Anila Lehman OD     Again, thank you for allowing me to participate in the care of your patient.        Sincerely,        Anila Lehman, OD

## 2018-09-10 NOTE — MR AVS SNAPSHOT
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  Regular lid hygiene helps diminish the bacterial population to prevent inflammation and infection.  Use a warm compress to loosen crusts   Cleanse lids once daily with a lid cleansing product as directed such as Ocusoft or Sterilid which can be purchased at most pharmacies. Diluted baby shampoo will also work, but not as well.     I recommend using artificial tears for your dry eye. There are over the counter drops that work well and may be used up to 4 x daily. ( systane , refresh, thera tears) If you need more than 4 drops daily, use a preservative free product which come in individual vials and may be used for 24 hours until finished and discarded.   No change needed in prescription           Follow-ups after your visit        Your next 10 appointments already scheduled     Oct 02, 2018  9:45 AM CDT   LAB with RU LAB   [facility] [place] ([facility])    [address] [address] [address] [phone]           Please do not eat 10-12 hours before your appointment if you are coming in fasting for labs on lipids, cholesterol, or glucose (sugar). This does not apply to pregnant women. Water, hot tea and black coffee (with nothing added) are okay. Do not drink other fluids, diet soda or chew gum.        "     Oct 02, 2018 10:45 AM CDT   Return Visit with Patric Kulkarni MD   [facility] ([facility])    [address]  [facility] [address] [phone]              Who to contact     If you have questions or need follow up information about today's clinic visit or your schedule please contact [facility] directly at [phone].  Normal or non-critical lab and imaging results will be communicated to you by [email or web address], letter or phone within 4 business days after the clinic has received the results. If you do not hear from us within 7 days, please contact the clinic through MedGRCt or phone. If you have a critical or abnormal lab result, we will notify you by phone as soon as possible.  Submit refill requests through quitchen or call your pharmacy and they will forward the refill request to us. Please allow 3 business days for your refill to be completed.          Additional Information About Your Visit        quitchen Information     quitchen lets you send messages to your doctor, view your test results, renew your prescriptions, schedule appointments and more. To sign up, go to [email or web address] . Click on \"Log in\" on the left side of the screen, which will take you to the Welcome page. Then click on \"Sign up Now\" on the right side of the page.     You will be asked to enter the access code listed below, as well as some personal information. Please follow the directions to create your username and password.     Your access code is: [email or web address]  Expires: 2018 10:18 AM     Your access code will  in 90 days. If you need help or a new code, please call your [place] clinic or [phone].        Care EveryWhere ID     This is your Care EveryWhere ID. This could be used by other organizations to access your [place] medical records  [id number]         Blood Pressure from Last 3 Encounters:   18 135/80 "   06/29/18 164/64   11/13/17 102/62    Weight from Last 3 Encounters:   06/29/18 59 kg (130 lb)   11/13/17 56.7 kg (125 lb)   11/07/17 57.6 kg (127 lb)              Today, you had the following     No orders found for display       Primary Care Provider Office Phone # Fax #    Kale Thompson [phone] [address] [place] [address]        Equal Access to Services     [facility]: Hadii aad ku hadasho Soomaali, waaxda luqadaha, qaybta kaalmada adeegyada, waxay idiin hayaan adeeg menaaraemily laarya . So [facility] [phone].    ATENCIÓN: Si habla español, tiene a hylton disposición servicios gratuitos de asistencia lingüística. [facility] [phone].    We comply with applicable federal civil rights laws and [place] laws. We do not discriminate on the basis of race, color, national origin, age, disability, sex, sexual orientation, or gender identity.            Thank you!     Thank you for choosing [facility]  for your care. Our goal is always to provide you with excellent care. Hearing back from our patients is one way we can continue to improve our services. Please take a few minutes to complete the written survey that you may receive in the mail after your visit with us. Thank you!             Your Updated Medication List - Protect others around you: Learn how to safely use, store and throw away your medicines at www.disposemymeds.org.          This list is accurate as of 9/10/18  2:30 PM.  Always use your most recent med list.                   Brand Name Dispense Instructions for use Diagnosis    alfuzosin 10 MG 24 hr tablet    UROXATRAL    90 tablet    TAKE ONE TABLET BY MOUTH ONCE DAILY    Essential hypertension with goal blood pressure less than 140/90       amLODIPine 5 MG tablet    NORVASC    90 tablet    Take 1 tablet (5 mg) by mouth daily    Coronary artery disease involving native coronary artery of native heart without angina pectoris, Essential hypertension  with goal blood pressure less than 140/90       aspirin 81 MG tablet      Take 81 mg by mouth daily        CALCIUM 600 + D PO      With magnesium and zinc        finasteride 5 MG tablet    PROSCAR    90 tablet    TAKE ONE TABLET BY MOUTH ONCE DAILY    Benign prostatic hyperplasia, unspecified whether lower urinary tract symptoms present       fluticasone 50 MCG/ACT spray    FLONASE    16 g    Spray 1-2 sprays into both nostrils daily    Chronic rhinitis       hydrocortisone 25 MG Suppository    ANUSOL-HC     Place 25 mg rectally        LANSOPRAZOLE PO      Take 30 mg by mouth daily        LIPITOR 40 MG tablet   Generic drug:  atorvastatin     90 tablet    Take 1 tablet (40 mg) by mouth daily    Pure hypercholesterolemia       lisinopril 10 MG tablet    PRINIVIL/ZESTRIL    90 tablet    Take 1 tablet (10 mg) by mouth daily    Coronary artery disease involving native coronary artery of native heart without angina pectoris, Essential hypertension with goal blood pressure less than 140/90       metoprolol succinate 25 MG 24 hr tablet    TOPROL-XL    45 tablet    Take 0.5 tablets (12.5 mg) by mouth daily    ASCVD (arteriosclerotic cardiovascular disease), Essential hypertension       metoprolol tartrate 25 MG tablet    LOPRESSOR     Take 25 mg by mouth        * order for DME     2 each    Jobst Stockings. Knee high. Medium compression (20-30 mmHg). Dispense 2 pairs.    Coronary artery disease involving native coronary artery of native heart without angina pectoris, Bilateral edema of lower extremity       * order for DME     1 each    Auto-CPAP, setting 5-15 cm H2O. Sleep study 10/2016. RDI 24.7. AHI 15.5. REM AHI 37.6.    NED (obstructive sleep apnea)       * order for DME     1 each    CPAP tubing, mask and necessary supplies    NED (obstructive sleep apnea)       pantoprazole 40 MG EC tablet    PROTONIX     Take 40 mg by mouth        RABEprazole 20 MG EC tablet    ACIPHEX    90 tablet    TAKE ONE TABLET BY MOUTH ONCE  DAILY    Gastroesophageal reflux disease without esophagitis       rOPINIRole 0.5 MG tablet    REQUIP    90 tablet    TAKE ONE TABLET BY MOUTH AT BEDTIME    Restless leg syndrome       rosuvastatin 20 MG tablet    CRESTOR    90 tablet    TAKE ONE TABLET BY MOUTH ONCE DAILY FOR CHOLESTEROL    Hyperlipidemia LDL goal <70       sennosides 8.6 MG tablet    SENOKOT    120 each    Take 1 tablet by mouth as needed        tolterodine 4 MG 24 hr capsule    DETROL LA    90 capsule    TAKE ONE CAPSULE BY MOUTH AT BEDTIME    Benign prostatic hyperplasia with nocturia       traMADol 50 MG tablet    ULTRAM          * Notice:  This list has 3 medication(s) that are the same as other medications prescribed for you. Read the directions carefully, and ask your doctor or other care provider to review them with you.

## 2018-10-02 ENCOUNTER — TRANSFERRED RECORDS (OUTPATIENT)
Dept: HEALTH INFORMATION MANAGEMENT | Facility: CLINIC | Age: 83
End: 2018-10-02

## 2018-10-02 ENCOUNTER — OFFICE VISIT (OUTPATIENT)
Dept: CARDIOLOGY | Facility: CLINIC | Age: 83
End: 2018-10-02
Payer: COMMERCIAL

## 2018-10-02 VITALS
WEIGHT: 136 LBS | HEIGHT: 66 IN | DIASTOLIC BLOOD PRESSURE: 64 MMHG | SYSTOLIC BLOOD PRESSURE: 99 MMHG | HEART RATE: 54 BPM | OXYGEN SATURATION: 97 % | BODY MASS INDEX: 21.86 KG/M2

## 2018-10-02 DIAGNOSIS — I10 ESSENTIAL HYPERTENSION WITH GOAL BLOOD PRESSURE LESS THAN 140/90: Primary | ICD-10-CM

## 2018-10-02 DIAGNOSIS — I25.10 CORONARY ARTERY DISEASE INVOLVING NATIVE CORONARY ARTERY OF NATIVE HEART WITHOUT ANGINA PECTORIS: ICD-10-CM

## 2018-10-02 DIAGNOSIS — R42 DIZZINESS: ICD-10-CM

## 2018-10-02 LAB
ALT SERPL W P-5'-P-CCNC: 18 U/L (ref 0–70)
CHOLEST SERPL-MCNC: 117 MG/DL
HDLC SERPL-MCNC: 65 MG/DL
LDLC SERPL CALC-MCNC: 42 MG/DL
NONHDLC SERPL-MCNC: 52 MG/DL
TRIGL SERPL-MCNC: 48 MG/DL

## 2018-10-02 PROCEDURE — 84460 ALANINE AMINO (ALT) (SGPT): CPT | Performed by: INTERNAL MEDICINE

## 2018-10-02 PROCEDURE — 99213 OFFICE O/P EST LOW 20 MIN: CPT | Performed by: INTERNAL MEDICINE

## 2018-10-02 PROCEDURE — 36415 COLL VENOUS BLD VENIPUNCTURE: CPT | Performed by: INTERNAL MEDICINE

## 2018-10-02 PROCEDURE — 80061 LIPID PANEL: CPT | Performed by: INTERNAL MEDICINE

## 2018-10-02 NOTE — LETTER
10/2/2018    Kale Thompson MD  [address] [place] [address]    RE: Jessica Mckeon       Dear Colleague,    I had the pleasure of seeing Jessica Mckeon in the [facility].    HPI and Plan:   See dictation    Orders Placed This Encounter   Procedures     Basic metabolic panel     Lipid Profile     ALT     Follow-Up with Cardiologist       Orders Placed This Encounter   Medications     UNKNOWN TO PATIENT     Sig: Eye drops for leakage in eyes       There are no discontinued medications.      Encounter Diagnoses   Name Primary?     Essential hypertension with goal blood pressure less than 140/90 Yes     Coronary artery disease involving native coronary artery of native heart without angina pectoris      Dizziness        CURRENT MEDICATIONS:  Current Outpatient Prescriptions   Medication Sig Dispense Refill     alfuzosin (UROXATRAL) 10 MG 24 hr tablet TAKE ONE TABLET BY MOUTH ONCE DAILY 90 tablet 3     amLODIPine (NORVASC) 5 MG tablet Take 1 tablet (5 mg) by mouth daily 90 tablet 2     aspirin 81 MG tablet Take 81 mg by mouth daily       atorvastatin (LIPITOR) 40 MG tablet Take 1 tablet (40 mg) by mouth daily 90 tablet 1     finasteride (PROSCAR) 5 MG tablet TAKE ONE TABLET BY MOUTH ONCE DAILY 90 tablet 3     fluticasone (FLONASE) 50 MCG/ACT nasal spray Spray 1-2 sprays into both nostrils daily 16 g 3     hydrocortisone (ANUSOL-HC) 25 MG Suppository Place 25 mg rectally       LANSOPRAZOLE PO Take 30 mg by mouth daily       metoprolol (TOPROL-XL) 25 MG 24 hr tablet Take 0.5 tablets (12.5 mg) by mouth daily 45 tablet 2     pantoprazole (PROTONIX) 40 MG EC tablet Take 40 mg by mouth       RABEprazole (ACIPHEX) 20 MG EC tablet TAKE ONE TABLET BY MOUTH ONCE DAILY 90 tablet 3     rOPINIRole (REQUIP) 0.5 MG tablet TAKE ONE TABLET BY MOUTH AT BEDTIME 90 tablet 3     rosuvastatin (CRESTOR) 20 MG tablet TAKE ONE TABLET BY MOUTH ONCE DAILY FOR CHOLESTEROL 90 tablet 3      tolterodine (DETROL LA) 4 MG 24 hr capsule TAKE ONE CAPSULE BY MOUTH AT BEDTIME 90 capsule 3     UNKNOWN TO PATIENT Eye drops for leakage in eyes       Calcium Carb-Cholecalciferol (CALCIUM 600 + D PO) With magnesium and zinc       lisinopril (PRINIVIL/ZESTRIL) 10 MG tablet Take 1 tablet (10 mg) by mouth daily (Patient not taking: Reported on 10/2/2018) 90 tablet 2     order for DME Auto-CPAP, setting 5-15 cm H2O. Sleep study 10/2016. RDI 24.7. AHI 15.5. REM AHI 37.6. (Patient not taking: Reported on 10/2/2018) 1 each 0     order for DME CPAP tubing, mask and necessary supplies (Patient not taking: Reported on 10/2/2018) 1 each 0     order for DME Jobst Stockings. Knee high. Medium compression (20-30 mmHg). Dispense 2 pairs. (Patient not taking: Reported on 10/2/2018) 2 each 5     sennosides (SENNA) 8.6 MG tablet Take 1 tablet by mouth as needed  120 each      traMADol (ULTRAM) 50 MG tablet   2       ALLERGIES   No Known Allergies    PAST MEDICAL HISTORY:  Past Medical History:   Diagnosis Date     Anxiety      Arthritis     mild in neck     Benign neoplasm of colon 12/2007    colonic polyps     BPPV (benign paroxysmal positional vertigo) 9/16/2008     Coronary artery disease     3/11: PTCA & JACK to prox Cfx     Gastro-oesophageal reflux disease      Heart attack (H) 4/29/2016     Hyperlipidemia      Near syncope      NED (obstructive sleep apnea) 11/13/2017     Palpitations      Syncope      Unspecified essential hypertension      Urinary sys symptom NEC     BPH sx     Vertigo        PAST SURGICAL HISTORY:  Past Surgical History:   Procedure Laterality Date     BACK SURGERY  1993     CATARACT IOL, RT/LT  4/14/10    Right     CORONARY ANGIOGRAPHY ADULT ORDER  3/22/11     HEART CATH, ANGIOPLASTY  3/22/11    PTCA & JACK to prox Cfx     HYDROCELECTOMY INGUINAL  9/4/2012    Procedure: HYDROCELECTOMY INGUINAL;  Left Hydrocelectomy;  Surgeon: Wili Yan MD;  Location: [facility] OR     TESTICLE SURGERY         FAMILY  "HISTORY:  Family History   Problem Relation Age of Onset     Diabetes Mother      HEART DISEASE Father 97     Glaucoma No family hx of      Macular Degeneration No family hx of        SOCIAL HISTORY:  Social History     Social History     Marital status:      Spouse name: N/A     Number of children: N/A     Years of education: N/A     Social History Main Topics     Smoking status: Never Smoker     Smokeless tobacco: Never Used     Alcohol use No     Drug use: No     Sexual activity: Not Currently     Other Topics Concern     Caffeine Concern Yes     green tea     Sleep Concern Yes     night munoz      Stress Concern Yes     worries about every thing     Special Diet No     watching sodium intake     Exercise Yes     walking 20 minutes daily     Social History Narrative       Review of Systems:  Skin:  Negative       Eyes:  Positive for glasses some leakage of left eye   ENT:  Positive for hearing loss hearing aids, Hx sinus infections   Respiratory:  Positive for dyspnea on exertion     Cardiovascular:    lightheadedness;chest pain;Positive for;fatigue;edema lightheaded after hot shower/bath and when BP drops below 100 and slight chest pain with SOB and exertion   Gastroenterology: Positive for reflux controlled with medication  Genitourinary:  Positive for urinary frequency goes every 2 hours   Musculoskeletal:  Positive for back pain    Neurologic:  Negative      Psychiatric:  Positive for depression takes meds to controll the depression   Heme/Lymph/Imm:    easy bruising    Endocrine:  Negative        Physical Exam:  Vitals: BP 99/64 (BP Location: Right arm, Patient Position: Sitting, Cuff Size: Adult Regular)  Pulse 54  Ht 1.676 m (5' 6\")  Wt 61.7 kg (136 lb)  SpO2 97%  BMI 21.95 kg/m2    Constitutional:  cooperative, alert and oriented, well developed, well nourished, in no acute distress        Skin:  warm and dry to the touch, no apparent skin lesions or masses noted          Head:  normocephalic, " no masses or lesions        Eyes:  pupils equal and round, conjunctivae and lids unremarkable, sclera white, no xanthalasma, EOMS intact, no nystagmus        Lymph:      ENT:  no pallor or cyanosis, dentition good;no pallor or cyanosis hearing aide(s) present      Neck:  carotid pulses are full and equal bilaterally, JVP normal, no carotid bruit    (Right carotid bruit.)    Respiratory:  normal breath sounds, clear to auscultation, normal A-P diameter, normal symmetry, normal respiratory excursion, no use of accessory muscles         Cardiac: regular rhythm;normal S1 and S2;apical impulse not displaced       systolic ejection murmur;RUSB;grade 2;radiation to the carotid        pulses full and equal, no bruits auscultated                                        GI:  abdomen soft, non-tender, BS normoactive, no mass, no HSM, no bruits        Extremities and Muscular Skeletal:  no deformities, clubbing, cyanosis, erythema observed;no edema              Neurological:  no gross motor deficits;affect appropriate        Psych:  Alert and Oriented x 3        CC  Kale Thompson MD  [address] [place] [address] [place] [address]                Thank you for allowing me to participate in the care of your patient.      Sincerely,     Patric Kulkarni MD, MD     [facility]    cc:   Kale Thompson MD  [address] [place] [address] [place] [address]

## 2018-10-02 NOTE — MR AVS SNAPSHOT
After Visit Summary   10/2/2018    Jessica Mckeon    MRN: [id number]           Patient Information     Date Of Birth          10/26/1929        Visit Information        Provider Department      10/2/2018 10:30 AM Patric Kulkarni MD; [place] [employer] [facility]        Today's Diagnoses     Essential hypertension with goal blood pressure less than 140/90    -  1    Coronary artery disease involving native coronary artery of native heart without angina pectoris        Dizziness           Follow-ups after your visit        Additional Services     Follow-Up with Cardiologist                 Future tests that were ordered for you today     Open Future Orders        Priority Expected Expires Ordered    Basic metabolic panel Routine 10/2/2019 10/3/2019 10/2/2018    Lipid Profile Routine 10/2/2019 10/2/2019 10/2/2018    ALT Routine 10/2/2019 10/2/2019 10/2/2018    Follow-Up with Cardiologist Routine 10/2/2019 10/3/2019 10/2/2018            Who to contact     If you have questions or need follow up information about today's clinic visit or your schedule please contact [facility] directly at [phone].  Normal or non-critical lab and imaging results will be communicated to you by MyChart, letter or phone within 4 business days after the clinic has received the results. If you do not hear from us within 7 days, please contact the clinic through MyChart or phone. If you have a critical or abnormal lab result, we will notify you by phone as soon as possible.  Submit refill requests through [employer] or call your pharmacy and they will forward the refill request to us. Please allow 3 business days for your refill to be completed.          Additional Information About Your Visit        Care EveryWhere ID     This is your Care EveryWhere ID. This could be used by other organizations to access your  "[facility] medical records  [id number]        Your Vitals Were     Pulse Height Pulse Oximetry BMI (Body Mass Index)          54 1.676 m (5' 6\") 97% 21.95 kg/m2         Blood Pressure from Last 3 Encounters:   10/02/18 99/64   07/18/18 135/80   06/29/18 164/64    Weight from Last 3 Encounters:   10/02/18 61.7 kg (136 lb)   06/29/18 59 kg (130 lb)   11/13/17 56.7 kg (125 lb)               Primary Care Provider Office Phone # Fax #    Kale Thompson [phone] [address] [place] [address]        Equal Access to Services     [facility] : Hadii courtney rossi hadasho Soomaali, waaxda luqadaha, qaybta kaalmada adeegyada, dana mosqueda . So [facility] [phone].    ATENCIÓN: Si habla español, tiene a hylton disposición servicios gratuitos de asistencia lingüística. [facility] [phone].    We comply with applicable federal civil rights laws and [place] laws. We do not discriminate on the basis of race, color, national origin, age, disability, sex, sexual orientation, or gender identity.            Thank you!     Thank you for choosing [facility]  for your care. Our goal is always to provide you with excellent care. Hearing back from our patients is one way we can continue to improve our services. Please take a few minutes to complete the written survey that you may receive in the mail after your visit with us. Thank you!             Your Updated Medication List - Protect others around you: Learn how to safely use, store and throw away your medicines at www.disposemymeds.org.          This list is accurate as of 10/2/18 10:50 AM.  Always use your most recent med list.                   Brand Name Dispense Instructions for use Diagnosis    alfuzosin 10 MG 24 hr tablet    UROXATRAL    90 tablet    TAKE ONE TABLET BY MOUTH ONCE DAILY    Essential hypertension with goal blood pressure less than 140/90       amLODIPine 5 MG " tablet    NORVASC    90 tablet    Take 1 tablet (5 mg) by mouth daily    Coronary artery disease involving native coronary artery of native heart without angina pectoris, Essential hypertension with goal blood pressure less than 140/90       aspirin 81 MG tablet      Take 81 mg by mouth daily        CALCIUM 600 + D PO      With magnesium and zinc        finasteride 5 MG tablet    PROSCAR    90 tablet    TAKE ONE TABLET BY MOUTH ONCE DAILY    Benign prostatic hyperplasia, unspecified whether lower urinary tract symptoms present       fluticasone 50 MCG/ACT spray    FLONASE    16 g    Spray 1-2 sprays into both nostrils daily    Chronic rhinitis       hydrocortisone 25 MG Suppository    ANUSOL-HC     Place 25 mg rectally        LANSOPRAZOLE PO      Take 30 mg by mouth daily        LIPITOR 40 MG tablet   Generic drug:  atorvastatin     90 tablet    Take 1 tablet (40 mg) by mouth daily    Pure hypercholesterolemia       lisinopril 10 MG tablet    PRINIVIL/ZESTRIL    90 tablet    Take 1 tablet (10 mg) by mouth daily    Coronary artery disease involving native coronary artery of native heart without angina pectoris, Essential hypertension with goal blood pressure less than 140/90       metoprolol succinate 25 MG 24 hr tablet    TOPROL-XL    45 tablet    Take 0.5 tablets (12.5 mg) by mouth daily    ASCVD (arteriosclerotic cardiovascular disease), Essential hypertension       * order for DME     2 each    Jobst Stockings. Knee high. Medium compression (20-30 mmHg). Dispense 2 pairs.    Coronary artery disease involving native coronary artery of native heart without angina pectoris, Bilateral edema of lower extremity       * order for DME     1 each    Auto-CPAP, setting 5-15 cm H2O. Sleep study 10/2016. RDI 24.7. AHI 15.5. REM AHI 37.6.    NED (obstructive sleep apnea)       * order for DME     1 each    CPAP tubing, mask and necessary supplies    NED (obstructive sleep apnea)       pantoprazole 40 MG EC tablet    PROTONIX      Take 40 mg by mouth        RABEprazole 20 MG EC tablet    ACIPHEX    90 tablet    TAKE ONE TABLET BY MOUTH ONCE DAILY    Gastroesophageal reflux disease without esophagitis       rOPINIRole 0.5 MG tablet    REQUIP    90 tablet    TAKE ONE TABLET BY MOUTH AT BEDTIME    Restless leg syndrome       rosuvastatin 20 MG tablet    CRESTOR    90 tablet    TAKE ONE TABLET BY MOUTH ONCE DAILY FOR CHOLESTEROL    Hyperlipidemia LDL goal <70       sennosides 8.6 MG tablet    SENOKOT    120 each    Take 1 tablet by mouth as needed        tolterodine 4 MG 24 hr capsule    DETROL LA    90 capsule    TAKE ONE CAPSULE BY MOUTH AT BEDTIME    Benign prostatic hyperplasia with nocturia       traMADol 50 MG tablet    ULTRAM          UNKNOWN TO PATIENT      Eye drops for leakage in eyes        * Notice:  This list has 3 medication(s) that are the same as other medications prescribed for you. Read the directions carefully, and ask your doctor or other care provider to review them with you.

## 2018-10-02 NOTE — LETTER
10/2/2018      Kale Thompson MD  [address] [place] [address]      RE: Jessica Mckeon       Dear Colleague,    I had the pleasure of seeing Jessica Mckeon in the [facility].    Service Date: 10/02/2018      REFERRING PHYSICIAN:  Dr. Kale Thompson      HISTORY OF PRESENT ILLNESS:  It is my pleasure to see your patient, Jessica Mckeon.  He is an 88-year-old gentleman with a past history of coronary artery disease.  If you remember, he underwent angioplasty and stenting to the circumflex in 2011.  He also has a history of essential hypertension.  Since I last saw him, he has been doing relatively well.  He has no symptoms of angina pectoris.  He swims quite frequently and has no chest pains or chest pressure with this.  He gets no discomfort in his chest when walking.  He was getting some shortness of breath, however, when he was swimming but he had what sounds like sinus or nasal infections and he was given antibiotics and steroids by ENT.  This has improved his breathing.  In the past, he could swim about 20 laps without becoming short of breath.  With the nasal infection, he went down to 3 laps but now with treatment and steroids, he has gone up to about 6 laps.      Occasionally, he gets some discomfort in his chest but this usually occurs infrequently when he eats something which disagrees with him, or if he is constipated, sometimes he will get the discomfort in his chest.  Again, this is not associated with exertion at all.  It does not last long.  He usually takes a few deep breaths in and then the pain goes away.  His blood pressure is at the lower normal today at 99/64.  His lipids are excellent with an LDL of 42, HDL of 65 and triglycerides of 48 and a total cholesterol of 117.  His liver function tests are normal with an ALT of 18.        He does notice some dizziness and lightheadedness if he is in the hot tub for a period of time.  I told him that this is  due to the fact that in a hot tub he will vasodilate and that amplifies the effect of the amlodipine medication and the metoprolol, so he should not stay in the hot tub for a long period of time and he should be well hydrated also.      IMPRESSION:   1.  Coronary artery disease.  The patient is asymptomatic with respect to coronary artery disease.  He has occasional chest discomfort which sounds distinctly noncardiac and he exercises to a greater degree than most [id number] do and he has no chest discomfort with that.   2.  Excellent lipid profile.   3.  Normotensive.   4.  Significant improvement in dizziness since reducing the dose of metoprolol down to 12.5 mg per day.   5.  Dizziness associated with the hot tub as described above.      PLAN:  We will continue the patient on his present medications.  I will see him back again in 1 year's time but if he has any questions or any concerns, especially if his chest pain gets worse or his dizziness gets worse, he is to contact us immediately.  It has been my pleasure to be involved in the care of this very nice patient.  I look forward to seeing him again.      cc:   [place] [address]         JAE CARBAJAL MD, [facility]             D: 10/02/2018   T: 10/02/2018   MT: DAMON      Name:     TRAY BLAKE   MRN:      [id number]        Account:      [id number]   :      10/26/1929           Service Date: 10/02/2018      Document: [id number]         Outpatient Encounter Prescriptions as of 10/2/2018   Medication Sig Dispense Refill     alfuzosin (UROXATRAL) 10 MG 24 hr tablet TAKE ONE TABLET BY MOUTH ONCE DAILY 90 tablet 3     amLODIPine (NORVASC) 5 MG tablet Take 1 tablet (5 mg) by mouth daily 90 tablet 2     aspirin 81 MG tablet Take 81 mg by mouth daily       atorvastatin (LIPITOR) 40 MG tablet Take 1 tablet (40 mg) by mouth daily 90 tablet 1     finasteride (PROSCAR) 5 MG tablet TAKE ONE  TABLET BY MOUTH ONCE DAILY 90 tablet 3     fluticasone (FLONASE) 50 MCG/ACT nasal spray Spray 1-2 sprays into both nostrils daily 16 g 3     hydrocortisone (ANUSOL-HC) 25 MG Suppository Place 25 mg rectally       LANSOPRAZOLE PO Take 30 mg by mouth daily       metoprolol (TOPROL-XL) 25 MG 24 hr tablet Take 0.5 tablets (12.5 mg) by mouth daily 45 tablet 2     pantoprazole (PROTONIX) 40 MG EC tablet Take 40 mg by mouth       RABEprazole (ACIPHEX) 20 MG EC tablet TAKE ONE TABLET BY MOUTH ONCE DAILY 90 tablet 3     rOPINIRole (REQUIP) 0.5 MG tablet TAKE ONE TABLET BY MOUTH AT BEDTIME 90 tablet 3     rosuvastatin (CRESTOR) 20 MG tablet TAKE ONE TABLET BY MOUTH ONCE DAILY FOR CHOLESTEROL 90 tablet 3     tolterodine (DETROL LA) 4 MG 24 hr capsule TAKE ONE CAPSULE BY MOUTH AT BEDTIME 90 capsule 3     UNKNOWN TO PATIENT Eye drops for leakage in eyes       Calcium Carb-Cholecalciferol (CALCIUM 600 + D PO) With magnesium and zinc       lisinopril (PRINIVIL/ZESTRIL) 10 MG tablet Take 1 tablet (10 mg) by mouth daily (Patient not taking: Reported on 10/2/2018) 90 tablet 2     order for DME Auto-CPAP, setting 5-15 cm H2O. Sleep study 10/2016. RDI 24.7. AHI 15.5. REM AHI 37.6. (Patient not taking: Reported on 10/2/2018) 1 each 0     order for DME CPAP tubing, mask and necessary supplies (Patient not taking: Reported on 10/2/2018) 1 each 0     order for DME Jobst Stockings. Knee high. Medium compression (20-30 mmHg). Dispense 2 pairs. (Patient not taking: Reported on 10/2/2018) 2 each 5     sennosides (SENNA) 8.6 MG tablet Take 1 tablet by mouth as needed  120 each      traMADol (ULTRAM) 50 MG tablet   2     [DISCONTINUED] metoprolol (LOPRESSOR) 25 MG tablet Take 25 mg by mouth       Facility-Administered Encounter Medications as of 10/2/2018   Medication Dose Route Frequency Provider Last Rate Last Dose     iohexol (OMNIPAQUE) 300 mg/mL injection 10 mL  10 mL EPIDURAL Once Lola Amado MD           Again, thank you for allowing  me to participate in the care of your patient.      Sincerely,    Patric Kulkarni MD, MD     [facility]

## 2018-10-02 NOTE — PROGRESS NOTES
Service Date: 10/02/2018      REFERRING PHYSICIAN:  Dr. Kale Thompson      HISTORY OF PRESENT ILLNESS:  It is my pleasure to see your patient, Jessica Mckeon.  He is an 88-year-old gentleman with a past history of coronary artery disease.  If you remember, he underwent angioplasty and stenting to the circumflex in 2011.  He also has a history of essential hypertension.  Since I last saw him, he has been doing relatively well.  He has no symptoms of angina pectoris.  He swims quite frequently and has no chest pains or chest pressure with this.  He gets no discomfort in his chest when walking.  He was getting some shortness of breath, however, when he was swimming but he had what sounds like sinus or nasal infections and he was given antibiotics and steroids by ENT.  This has improved his breathing.  In the past, he could swim about 20 laps without becoming short of breath.  With the nasal infection, he went down to 3 laps but now with treatment and steroids, he has gone up to about 6 laps.      Occasionally, he gets some discomfort in his chest but this usually occurs infrequently when he eats something which disagrees with him, or if he is constipated, sometimes he will get the discomfort in his chest.  Again, this is not associated with exertion at all.  It does not last long.  He usually takes a few deep breaths in and then the pain goes away.  His blood pressure is at the lower normal today at 99/64.  His lipids are excellent with an LDL of 42, HDL of 65 and triglycerides of 48 and a total cholesterol of 117.  His liver function tests are normal with an ALT of 18.        He does notice some dizziness and lightheadedness if he is in the hot tub for a period of time.  I told him that this is due to the fact that in a hot tub he will vasodilate and that amplifies the effect of the amlodipine medication and the metoprolol, so he should not stay in the hot tub for a long period of time and he should be well hydrated  also.      IMPRESSION:   1.  Coronary artery disease.  The patient is asymptomatic with respect to coronary artery disease.  He has occasional chest discomfort which sounds distinctly noncardiac and he exercises to a greater degree than most [id number] do and he has no chest discomfort with that.   2.  Excellent lipid profile.   3.  Normotensive.   4.  Significant improvement in dizziness since reducing the dose of metoprolol down to 12.5 mg per day.   5.  Dizziness associated with the hot tub as described above.      PLAN:  We will continue the patient on his present medications.  I will see him back again in 1 year's time but if he has any questions or any concerns, especially if his chest pain gets worse or his dizziness gets worse, he is to contact us immediately.  It has been my pleasure to be involved in the care of this very nice patient.  I look forward to seeing him again.      cc:   Kale Thompson MD    [address] [address]         [facility]AL MD, [facility]             D: 10/02/2018   T: 10/02/2018   MT: DAMON      Name:     TRAY BLAKE   MRN:      [id number]        Account:      [id number]   :      10/26/1929           Service Date: 10/02/2018      Document: [id number]

## 2018-12-11 DIAGNOSIS — I25.10 CORONARY ARTERY DISEASE INVOLVING NATIVE CORONARY ARTERY OF NATIVE HEART WITHOUT ANGINA PECTORIS: ICD-10-CM

## 2018-12-11 DIAGNOSIS — I10 ESSENTIAL HYPERTENSION WITH GOAL BLOOD PRESSURE LESS THAN 140/90: ICD-10-CM

## 2018-12-11 DIAGNOSIS — E78.5 HYPERLIPIDEMIA LDL GOAL <70: ICD-10-CM

## 2018-12-11 RX ORDER — AMLODIPINE BESYLATE 5 MG/1
5 TABLET ORAL DAILY
Qty: 90 TABLET | Refills: 1 | Status: SHIPPED | OUTPATIENT
Start: 2018-12-11 | End: 2019-07-03

## 2018-12-11 NOTE — TELEPHONE ENCOUNTER
"Requested Prescriptions   Pending Prescriptions Disp Refills     rosuvastatin (CRESTOR) 20 MG tablet [Pharmacy Med Name: ROSUVASTATIN 20MG TAB]    Last Written Prescription Date:  12/1/2017  Last Fill Quantity: 90,  # refills: 3   Last office visit: 6/29/2018 with prescribing provider:  Kale Thompson     Future Office Visit:     90 tablet 3     Sig: TAKE ONE TABLET BY MOUTH ONCE DAILY FOR CHOLESTEROL    Statins Protocol Passed - 12/11/2018 10:13 AM       Passed - LDL on file in past 12 months    Recent Labs   Lab Test 10/02/18  0914   LDL 42            Passed - No abnormal creatine kinase in past 12 months    No lab results found.            Passed - Recent (12 mo) or future (30 days) visit within the authorizing provider's specialty    Patient had office visit in the last 12 months or has a visit in the next 30 days with authorizing provider or within the authorizing provider's specialty.  See \"Patient Info\" tab in inbasket, or \"Choose Columns\" in Meds & Orders section of the refill encounter.             Passed - Patient is age 18 or older        alfuzosin ER (UROXATRAL) 10 MG 24 hr tablet [Pharmacy Med Name: ALFUZOSIN HCL ER 10MG      TAB]    Last Written Prescription Date:  12/1/2017  Last Fill Quantity: 90,  # refills: 3   Last office visit: 6/29/2018 with prescribing provider:  Kale Thompson     Future Office Visit:     90 tablet 3     Sig: TAKE ONE TABLET BY MOUTH ONCE DAILY    Alpha Blockers Passed - 12/11/2018 10:13 AM       Passed - Blood pressure under 140/90 in past 12 months    BP Readings from Last 3 Encounters:   10/02/18 99/64   07/18/18 135/80   06/29/18 164/64                Passed - Recent (12 mo) or future (30 days) visit within the authorizing provider's specialty    Patient had office visit in the last 12 months or has a visit in the next 30 days with authorizing provider or within the authorizing provider's specialty.  See \"Patient Info\" tab in inbasket, or \"Choose Columns\" in Meds & Orders " section of the refill encounter.             Passed - Patient does not have Tadalafil, Vardenafil, or Sildenafil on their medication list       Passed - Patient is 18 years of age or older

## 2018-12-12 RX ORDER — ALFUZOSIN HYDROCHLORIDE 10 MG/1
TABLET, EXTENDED RELEASE ORAL
Qty: 90 TABLET | Refills: 3 | Status: SHIPPED | OUTPATIENT
Start: 2018-12-12 | End: 2019-09-16

## 2018-12-12 RX ORDER — ROSUVASTATIN CALCIUM 20 MG/1
TABLET, COATED ORAL
Qty: 90 TABLET | Refills: 1 | Status: SHIPPED | OUTPATIENT
Start: 2018-12-12 | End: 2019-07-03

## 2018-12-12 NOTE — TELEPHONE ENCOUNTER
Prescription approved per [facility] Refill Protocol for the crestor.    Routing refill request to provider for review/approval because:  For the alfuzosin - some bps ok, some above parameters

## 2018-12-13 DIAGNOSIS — I10 ESSENTIAL HYPERTENSION: ICD-10-CM

## 2018-12-13 DIAGNOSIS — I10 ESSENTIAL HYPERTENSION WITH GOAL BLOOD PRESSURE LESS THAN 140/90: ICD-10-CM

## 2018-12-13 DIAGNOSIS — I25.10 ASCVD (ARTERIOSCLEROTIC CARDIOVASCULAR DISEASE): ICD-10-CM

## 2018-12-13 DIAGNOSIS — I25.10 CORONARY ARTERY DISEASE INVOLVING NATIVE CORONARY ARTERY OF NATIVE HEART WITHOUT ANGINA PECTORIS: ICD-10-CM

## 2018-12-13 RX ORDER — LISINOPRIL 10 MG/1
10 TABLET ORAL DAILY
Qty: 90 TABLET | Refills: 1 | Status: SHIPPED | OUTPATIENT
Start: 2018-12-13 | End: 2019-07-03

## 2018-12-13 RX ORDER — METOPROLOL SUCCINATE 25 MG/1
12.5 TABLET, EXTENDED RELEASE ORAL DAILY
Qty: 45 TABLET | Refills: 1 | Status: SHIPPED | OUTPATIENT
Start: 2018-12-13 | End: 2019-07-03

## 2019-07-03 DIAGNOSIS — I25.10 ASCVD (ARTERIOSCLEROTIC CARDIOVASCULAR DISEASE): ICD-10-CM

## 2019-07-03 DIAGNOSIS — E78.5 HYPERLIPIDEMIA LDL GOAL <70: ICD-10-CM

## 2019-07-03 DIAGNOSIS — I10 ESSENTIAL HYPERTENSION WITH GOAL BLOOD PRESSURE LESS THAN 140/90: ICD-10-CM

## 2019-07-03 DIAGNOSIS — I25.10 CORONARY ARTERY DISEASE INVOLVING NATIVE CORONARY ARTERY OF NATIVE HEART WITHOUT ANGINA PECTORIS: ICD-10-CM

## 2019-07-03 DIAGNOSIS — I10 ESSENTIAL HYPERTENSION: ICD-10-CM

## 2019-07-03 RX ORDER — ROSUVASTATIN CALCIUM 20 MG/1
TABLET, COATED ORAL
Qty: 30 TABLET | Refills: 0 | Status: SHIPPED | OUTPATIENT
Start: 2019-07-03 | End: 2019-07-05

## 2019-07-03 RX ORDER — LISINOPRIL 10 MG/1
10 TABLET ORAL DAILY
Qty: 90 TABLET | Refills: 1 | Status: SHIPPED | OUTPATIENT
Start: 2019-07-03 | End: 2019-10-22

## 2019-07-03 RX ORDER — METOPROLOL SUCCINATE 25 MG/1
12.5 TABLET, EXTENDED RELEASE ORAL DAILY
Qty: 45 TABLET | Refills: 1 | Status: SHIPPED | OUTPATIENT
Start: 2019-07-03 | End: 2019-10-22

## 2019-07-03 RX ORDER — AMLODIPINE BESYLATE 5 MG/1
5 TABLET ORAL DAILY
Qty: 90 TABLET | Refills: 1 | Status: SHIPPED | OUTPATIENT
Start: 2019-07-03 | End: 2019-10-22

## 2019-07-03 NOTE — TELEPHONE ENCOUNTER
rx sent without refills. Due for annual visit. Please let know    Denisse Epps MD  Internal Medicine/Pediatrics

## 2019-07-03 NOTE — TELEPHONE ENCOUNTER
I called and spoke to the pt with an interp on the phone and they would like the rx sent to the [facility] in [place], [place]. Thank you.   Summer Sandoval on 7/3/2019 at 4:04 PM

## 2019-07-03 NOTE — TELEPHONE ENCOUNTER
"Routing refill request to provider for review/approval because:  Patient needs to be seen because it has been more than 1 year since last office visit.  Pt due for annual exam 6/29/19    Medication pended for 30 day supply with reminder.             Requested Prescriptions   Pending Prescriptions Disp Refills     rosuvastatin (CRESTOR) 20 MG tablet [Pharmacy Med Name: ROSUVASTATIN CALCIUM 20 MG TAB] 90 tablet 0     Sig: TAKE 1 TABLET BY MOUTH EVERY DAY       Statins Protocol Failed - 7/3/2019  1:32 PM        Failed - Recent (12 mo) or future (30 days) visit within the authorizing provider's specialty     Patient had office visit in the last 12 months or has a visit in the next 30 days with authorizing provider or within the authorizing provider's specialty.  See \"Patient Info\" tab in inbasket, or \"Choose Columns\" in Meds & Orders section of the refill encounter.              Passed - LDL on file in past 12 months     Recent Labs   Lab Test 10/02/18  0914   LDL 42             Passed - No abnormal creatine kinase in past 12 months     No lab results found.             Passed - Medication is active on med list        Passed - Patient is age 18 or older          "

## 2019-07-05 RX ORDER — ROSUVASTATIN CALCIUM 20 MG/1
20 TABLET, COATED ORAL DAILY
Qty: 30 TABLET | Refills: 0 | Status: SHIPPED | OUTPATIENT
Start: 2019-07-05 | End: 2019-09-03

## 2019-07-05 NOTE — TELEPHONE ENCOUNTER
Transferred to [facility] pharmacy per patient's request.    Natasha Garibay RN  Message handled by Nurse Triage.

## 2019-09-03 DIAGNOSIS — E78.5 HYPERLIPIDEMIA LDL GOAL <70: ICD-10-CM

## 2019-09-03 RX ORDER — ROSUVASTATIN CALCIUM 20 MG/1
20 TABLET, COATED ORAL DAILY
Qty: 90 TABLET | Refills: 0 | Status: SHIPPED | OUTPATIENT
Start: 2019-09-03 | End: 2020-02-05

## 2019-09-03 NOTE — TELEPHONE ENCOUNTER
"Requested Prescriptions   Pending Prescriptions Disp Refills     rosuvastatin (CRESTOR) 20 MG tablet [Pharmacy Med Name: ROSUVASTATIN 20MG TAB]    Last Written Prescription Date:  7/5/2019  Last Fill Quantity: 30,  # refills: 0   Last office visit: 6/29/2018 with prescribing provider:  Kale Thompson     Future Office Visit:   Next 5 appointments (look out 90 days)    Sep 16, 2019  1:30 PM CDT  Office Visit with Kale Thompson MD, EA EXAM ROOM 01  [facility] ([address] [address]  [phone]          30 tablet 0     Sig: TAKE 1 TABLET BY MOUTH ONCE DAILY       Statins Protocol Passed - 9/3/2019 11:06 AM        Passed - LDL on file in past 12 months     Recent Labs   Lab Test 10/02/18  0914   LDL 42             Passed - No abnormal creatine kinase in past 12 months     No lab results found.             Passed - Recent (12 mo) or future (30 days) visit within the authorizing provider's specialty     Patient had office visit in the last 12 months or has a visit in the next 30 days with authorizing provider or within the authorizing provider's specialty.  See \"Patient Info\" tab in inbasket, or \"Choose Columns\" in Meds & Orders section of the refill encounter.              Passed - Medication is active on med list        Passed - Patient is age 18 or older          "

## 2019-09-16 ENCOUNTER — OFFICE VISIT (OUTPATIENT)
Dept: PEDIATRICS | Facility: CLINIC | Age: 84
End: 2019-09-16
Payer: COMMERCIAL

## 2019-09-16 VITALS
HEIGHT: 66 IN | TEMPERATURE: 97.4 F | RESPIRATION RATE: 14 BRPM | BODY MASS INDEX: 19.93 KG/M2 | SYSTOLIC BLOOD PRESSURE: 124 MMHG | OXYGEN SATURATION: 99 % | WEIGHT: 124 LBS | DIASTOLIC BLOOD PRESSURE: 60 MMHG | HEART RATE: 67 BPM

## 2019-09-16 DIAGNOSIS — I10 ESSENTIAL HYPERTENSION WITH GOAL BLOOD PRESSURE LESS THAN 140/90: Primary | ICD-10-CM

## 2019-09-16 DIAGNOSIS — E78.5 HYPERLIPIDEMIA LDL GOAL <70: ICD-10-CM

## 2019-09-16 DIAGNOSIS — H81.10 BENIGN PAROXYSMAL POSITIONAL VERTIGO, UNSPECIFIED LATERALITY: ICD-10-CM

## 2019-09-16 DIAGNOSIS — I25.10 CORONARY ARTERY DISEASE INVOLVING NATIVE CORONARY ARTERY OF NATIVE HEART WITHOUT ANGINA PECTORIS: ICD-10-CM

## 2019-09-16 PROCEDURE — 99214 OFFICE O/P EST MOD 30 MIN: CPT | Performed by: INTERNAL MEDICINE

## 2019-09-16 ASSESSMENT — MIFFLIN-ST. JEOR: SCORE: 1166.21

## 2019-09-16 NOTE — PROGRESS NOTES
"Subjective     Jessica Mckeon is a 89 year old male who presents to clinic today for the following health issues:    HPI   Hyperlipidemia Follow-Up      Are you having any of the following symptoms? (Select all that apply)  No complaints of shortness of breath, chest pain or pressure.  No increased sweating or nausea with activity.  No left-sided neck or arm pain.  No complaints of pain in calves when walking 1-2 blocks.    Are you regularly taking any medication or supplement to lower your cholesterol?   Yes- lipitor, and suppliments     Are you having muscle aches or other side effects that you think could be caused by your cholesterol lowering medication?  No    CAD / HTN. Somewhat difficult to determine exactly which medications he is taking, but med list updated to the best of my ability. No cardiac sx such as CP, palpitations, PND, orthopnea, MADRID. He has rare peripheral edema, typically late in the day. Sometimes legs feel \"cold\".   Initial BP was slightly high, but much better on my check.  Is followed by cardiology as well.    Hyperlipidemia. Reviewed labs. Nearly due for fasting labwork.     BPH. Stopped taking all  meds d/t lack of effectiveness. He has not noted any significant change in his urinary symptoms.       How many servings of fruits and vegetables do you eat daily?  4 or more    On average, how many sweetened beverages do you drink each day (soda, juice, sweet tea, etc)?   1    How many days per week do you miss taking your medication? 0    Reviewed and updated as needed this visit by Provider  Tobacco  Allergies  Meds  Problems  Med Hx  Surg Hx  Fam Hx         Review of Systems   ROS COMP: Constitutional, HEENT, cardiovascular, pulmonary, gi and gu systems are negative, except as otherwise noted.      Objective    /60   Pulse 67   Temp 97.4  F (36.3  C) (Oral)   Resp 14   Ht 1.67 m (5' 5.75\")   Wt 56.2 kg (124 lb)   SpO2 99%   BMI 20.17 kg/m    Body mass index is 20.17 " kg/m .  Physical Exam   GEN: No distress  HEENT: PERRL. EOMI. TM's clear bilaterally. Nasal mucosa normal. OP moist without lesions.  NECK: Supple. No LAD or TM.  LUNGS: Clear to auscultation bilaterally. No rhonchi, rales, wheezes or retractions.  CV: Regular rate and rhythm.  No murmurs, rubs or gallops. Pulses 2+ radial.  ABD: BS+. S, ND.   EXTR: No edema noted.  PSYCH: Normal affect. Well groomed. Good eye contact.           Assessment & Plan       ICD-10-CM    1. Essential hypertension with goal blood pressure less than 140/90 I10 CBC with platelets     **TSH with free T4 reflex FUTURE anytime   2. Benign paroxysmal positional vertigo, unspecified laterality H81.10 CBC with platelets     **TSH with free T4 reflex FUTURE anytime   3. Hyperlipidemia LDL goal <70 E78.5    4. Coronary artery disease involving native coronary artery of native heart without angina pectoris I25.10      No changes with your medications today    Fasting labwork tomorrow morning    Next visit with me in about 1 year       Kale Thompson MD  [facility]

## 2019-09-16 NOTE — PATIENT INSTRUCTIONS
No changes with your medications today    Fasting labwork tomorrow morning    Next visit with me in about 1 year

## 2019-09-17 DIAGNOSIS — H81.10 BENIGN PAROXYSMAL POSITIONAL VERTIGO, UNSPECIFIED LATERALITY: ICD-10-CM

## 2019-09-17 DIAGNOSIS — I10 ESSENTIAL HYPERTENSION WITH GOAL BLOOD PRESSURE LESS THAN 140/90: ICD-10-CM

## 2019-09-17 LAB
ERYTHROCYTE [DISTWIDTH] IN BLOOD BY AUTOMATED COUNT: 13.8 % (ref 10–15)
HCT VFR BLD AUTO: 38.7 % (ref 40–53)
HGB BLD-MCNC: 12.3 G/DL (ref 13.3–17.7)
MCH RBC QN AUTO: 28 PG (ref 26.5–33)
MCHC RBC AUTO-ENTMCNC: 31.8 G/DL (ref 31.5–36.5)
MCV RBC AUTO: 88 FL (ref 78–100)
PLATELET # BLD AUTO: 172 10E9/L (ref 150–450)
RBC # BLD AUTO: 4.39 10E12/L (ref 4.4–5.9)
TSH SERPL DL<=0.005 MIU/L-ACNC: 2.87 MU/L (ref 0.4–4)
WBC # BLD AUTO: 5.1 10E9/L (ref 4–11)

## 2019-09-17 PROCEDURE — 85027 COMPLETE CBC AUTOMATED: CPT | Performed by: INTERNAL MEDICINE

## 2019-09-17 PROCEDURE — 84443 ASSAY THYROID STIM HORMONE: CPT | Performed by: INTERNAL MEDICINE

## 2019-09-17 PROCEDURE — 36415 COLL VENOUS BLD VENIPUNCTURE: CPT | Performed by: INTERNAL MEDICINE

## 2019-10-22 DIAGNOSIS — I10 ESSENTIAL HYPERTENSION: ICD-10-CM

## 2019-10-22 DIAGNOSIS — I25.10 CORONARY ARTERY DISEASE INVOLVING NATIVE CORONARY ARTERY OF NATIVE HEART WITHOUT ANGINA PECTORIS: ICD-10-CM

## 2019-10-22 DIAGNOSIS — I25.10 ASCVD (ARTERIOSCLEROTIC CARDIOVASCULAR DISEASE): ICD-10-CM

## 2019-10-22 DIAGNOSIS — I10 ESSENTIAL HYPERTENSION WITH GOAL BLOOD PRESSURE LESS THAN 140/90: ICD-10-CM

## 2019-10-22 RX ORDER — METOPROLOL SUCCINATE 25 MG/1
12.5 TABLET, EXTENDED RELEASE ORAL DAILY
Qty: 45 TABLET | Refills: 0 | Status: SHIPPED | OUTPATIENT
Start: 2019-10-22 | End: 2020-04-29

## 2019-10-22 RX ORDER — LISINOPRIL 10 MG/1
10 TABLET ORAL DAILY
Qty: 90 TABLET | Refills: 0 | Status: SHIPPED | OUTPATIENT
Start: 2019-10-22 | End: 2020-02-06

## 2019-10-22 RX ORDER — AMLODIPINE BESYLATE 5 MG/1
5 TABLET ORAL DAILY
Qty: 90 TABLET | Refills: 0 | Status: SHIPPED | OUTPATIENT
Start: 2019-10-22 | End: 2020-04-30

## 2020-02-05 DIAGNOSIS — E78.5 HYPERLIPIDEMIA LDL GOAL <70: ICD-10-CM

## 2020-02-05 RX ORDER — ROSUVASTATIN CALCIUM 20 MG/1
20 TABLET, COATED ORAL DAILY
Qty: 90 TABLET | Refills: 3 | Status: SHIPPED | OUTPATIENT
Start: 2020-02-05 | End: 2021-04-29

## 2020-02-05 NOTE — LETTER
[facility]NANETTE Muniz[address] [place] [address]  [address]  [place] [address]  Phone: [phone]  Fax: [phone]        February 7, 2020      Jessica Mckeon                                                                                                                                [address] [facility] [address] [facility]  [place] [address]            Dear Mr. Mckeon,    We are concerned about your health care.  We recently provided you with a medication refill.  Many medications require routine follow-up with your Doctor.      At this time we ask that: You schedule an appointment for a lab visit. You will need to be fasting for this visit to recheck lipids.     Your prescription: Has been refilled for 3 months so you may have time for the above noted follow-up.    Please call the scheduling line at [phone] to set up your appointment. If you have any questions, you can call the number mentioned above.     Thank you,      Kale Thompson MD

## 2020-02-05 NOTE — TELEPHONE ENCOUNTER
"Failed ldl protocol    Requested Prescriptions   Pending Prescriptions Disp Refills     rosuvastatin (CRESTOR) 20 MG tablet 90 tablet 0     Sig: Take 1 tablet (20 mg) by mouth daily Office visit needed prior to additional refills.       Statins Protocol Failed - 2/5/2020 10:30 AM        Failed - LDL on file in past 12 months     Recent Labs   Lab Test 10/02/18  0914   LDL 42             Passed - No abnormal creatine kinase in past 12 months     No lab results found.             Passed - Recent (12 mo) or future (30 days) visit within the authorizing provider's specialty     Patient has had an office visit with the authorizing provider or a provider within the authorizing providers department within the previous 12 mos or has a future within next 30 days. See \"Patient Info\" tab in inbasket, or \"Choose Columns\" in Meds & Orders section of the refill encounter.              Passed - Medication is active on med list        Passed - Patient is age 18 or older        Last Written Prescription Date:  09/3/2019  Last Fill Quantity: 90,  # refills: 0   Last office visit: 9/16/2019 with prescribing provider:     Future Office Visit:      "

## 2020-02-06 DIAGNOSIS — I10 ESSENTIAL HYPERTENSION WITH GOAL BLOOD PRESSURE LESS THAN 140/90: ICD-10-CM

## 2020-02-06 DIAGNOSIS — I25.10 CORONARY ARTERY DISEASE INVOLVING NATIVE CORONARY ARTERY OF NATIVE HEART WITHOUT ANGINA PECTORIS: ICD-10-CM

## 2020-02-06 RX ORDER — LISINOPRIL 10 MG/1
10 TABLET ORAL DAILY
Qty: 90 TABLET | Refills: 0 | Status: SHIPPED | OUTPATIENT
Start: 2020-02-06 | End: 2020-04-30

## 2020-02-06 NOTE — TELEPHONE ENCOUNTER
Medication Refilled: Lisinopril  Last office visit: 10/2/2018  Last Labs/EKG: 10/2/2018  Next office visit: OVERDUE - 90 day supply, letter sent  Pharmacy sent to: Bernardo Smyth RN

## 2020-02-11 NOTE — TELEPHONE ENCOUNTER
Called and spoke to patient's wife (CTC). She states they are currently in [place] and won't return until May-June. I notified that pt is due for fasting labs and wife stated they will try to get them done there.     Yuko Sandhu MA 1:24 PM 2/11/2020

## 2020-02-12 ENCOUNTER — DOCUMENTATION ONLY (OUTPATIENT)
Dept: CARDIOLOGY | Facility: CLINIC | Age: 85
End: 2020-02-12

## 2020-04-29 DIAGNOSIS — I10 ESSENTIAL HYPERTENSION: ICD-10-CM

## 2020-04-29 DIAGNOSIS — I25.10 ASCVD (ARTERIOSCLEROTIC CARDIOVASCULAR DISEASE): ICD-10-CM

## 2020-04-29 RX ORDER — METOPROLOL SUCCINATE 25 MG/1
12.5 TABLET, EXTENDED RELEASE ORAL DAILY
Qty: 45 TABLET | Refills: 0 | Status: SHIPPED | OUTPATIENT
Start: 2020-04-29 | End: 2020-07-23

## 2020-04-29 NOTE — TELEPHONE ENCOUNTER
Routing refill request to provider for review/approval because:  A break in medication    Olga Donaldson, RN   [facility] -- Triage Nurse

## 2020-04-30 ENCOUNTER — DOCUMENTATION ONLY (OUTPATIENT)
Dept: CARDIOLOGY | Facility: CLINIC | Age: 85
End: 2020-04-30

## 2020-04-30 DIAGNOSIS — I10 ESSENTIAL HYPERTENSION WITH GOAL BLOOD PRESSURE LESS THAN 140/90: ICD-10-CM

## 2020-04-30 DIAGNOSIS — I25.10 CORONARY ARTERY DISEASE INVOLVING NATIVE CORONARY ARTERY OF NATIVE HEART WITHOUT ANGINA PECTORIS: ICD-10-CM

## 2020-04-30 RX ORDER — AMLODIPINE BESYLATE 5 MG/1
5 TABLET ORAL DAILY
Qty: 90 TABLET | Refills: 0 | Status: SHIPPED | OUTPATIENT
Start: 2020-04-30 | End: 2021-04-30

## 2020-04-30 RX ORDER — LISINOPRIL 10 MG/1
10 TABLET ORAL DAILY
Qty: 90 TABLET | Refills: 0 | Status: SHIPPED | OUTPATIENT
Start: 2020-04-30 | End: 2021-05-03

## 2020-04-30 NOTE — PROGRESS NOTES
Received refill requests from [facility] in [place] for lisinopril and amlodipine. Patient is overdue for follow up and has been sent a letter to his [place] address. Sending another notification letter to [place] address that patient needs to make an appointment or call to speak with team nurse regarding follow up and future refill authorizations.

## 2020-07-30 ENCOUNTER — TELEPHONE (OUTPATIENT)
Dept: CARDIOLOGY | Facility: CLINIC | Age: 85
End: 2020-07-30

## 2020-07-30 NOTE — TELEPHONE ENCOUNTER
Received refill requests from [facility] in [place] for lisinopril and amlodipine. Patient is overdue for follow up and has received letter to make apt for refills. Request faxed to [place] that request is denied. Eleno GONZALEZ

## 2021-04-29 DIAGNOSIS — E78.5 HYPERLIPIDEMIA LDL GOAL <70: ICD-10-CM

## 2021-04-29 RX ORDER — ROSUVASTATIN CALCIUM 20 MG/1
TABLET, COATED ORAL
Qty: 90 TABLET | Refills: 0 | Status: SHIPPED | OUTPATIENT
Start: 2021-04-29 | End: 2021-08-16

## 2021-04-29 NOTE — TELEPHONE ENCOUNTER
Rx filled.    Please contact pt or his family - it would be good if he can schedule a routine visit with me or cardiology in the near future.

## 2021-04-30 DIAGNOSIS — I10 ESSENTIAL HYPERTENSION WITH GOAL BLOOD PRESSURE LESS THAN 140/90: ICD-10-CM

## 2021-04-30 DIAGNOSIS — I25.10 ASCVD (ARTERIOSCLEROTIC CARDIOVASCULAR DISEASE): ICD-10-CM

## 2021-04-30 DIAGNOSIS — I10 ESSENTIAL HYPERTENSION: ICD-10-CM

## 2021-04-30 DIAGNOSIS — I25.10 CORONARY ARTERY DISEASE INVOLVING NATIVE CORONARY ARTERY OF NATIVE HEART WITHOUT ANGINA PECTORIS: ICD-10-CM

## 2021-04-30 RX ORDER — METOPROLOL SUCCINATE 25 MG/1
TABLET, EXTENDED RELEASE ORAL
Qty: 45 TABLET | Refills: 0 | Status: SHIPPED | OUTPATIENT
Start: 2021-04-30 | End: 2021-08-16

## 2021-04-30 RX ORDER — AMLODIPINE BESYLATE 5 MG/1
5 TABLET ORAL DAILY
Qty: 90 TABLET | Refills: 0 | Status: SHIPPED | OUTPATIENT
Start: 2021-04-30 | End: 2021-08-16

## 2021-04-30 NOTE — TELEPHONE ENCOUNTER
Routing refill request to provider for review/approval because:  Last labs and visit: Sept. 2019

## 2021-04-30 NOTE — TELEPHONE ENCOUNTER
2nd attempt: called patient's son, no answer and unable to leave VM. Will attempt again another time.     Yuko Sandhu MA 4:28 PM 4/30/2021

## 2021-05-03 RX ORDER — LISINOPRIL 10 MG/1
10 TABLET ORAL DAILY
Qty: 90 TABLET | Refills: 0 | Status: SHIPPED | OUTPATIENT
Start: 2021-05-03 | End: 2021-08-16

## 2021-05-03 NOTE — TELEPHONE ENCOUNTER
Routing refill request to provider for review/approval because:  Karin given x1 and patient did not follow up, please advise  Labs not current:  All  Patient needs to be seen because it has been more than 1 year since last office visit.  BP not in parameters for [facility] protocol for RN to refill

## 2021-05-03 NOTE — TELEPHONE ENCOUNTER
3rd attempt:  Left message requesting call back to schedule.  Provided clinic number.    Virgie Ernandez

## 2021-08-16 ENCOUNTER — OFFICE VISIT (OUTPATIENT)
Dept: PEDIATRICS | Facility: CLINIC | Age: 86
End: 2021-08-16
Payer: COMMERCIAL

## 2021-08-16 VITALS
WEIGHT: 124.8 LBS | DIASTOLIC BLOOD PRESSURE: 80 MMHG | HEIGHT: 65 IN | BODY MASS INDEX: 20.79 KG/M2 | HEART RATE: 71 BPM | SYSTOLIC BLOOD PRESSURE: 132 MMHG | OXYGEN SATURATION: 95 %

## 2021-08-16 DIAGNOSIS — E78.5 HYPERLIPIDEMIA LDL GOAL <70: ICD-10-CM

## 2021-08-16 DIAGNOSIS — I10 ESSENTIAL HYPERTENSION: ICD-10-CM

## 2021-08-16 DIAGNOSIS — Z00.00 ENCOUNTER FOR MEDICARE ANNUAL WELLNESS EXAM: Primary | ICD-10-CM

## 2021-08-16 DIAGNOSIS — I25.10 ASCVD (ARTERIOSCLEROTIC CARDIOVASCULAR DISEASE): ICD-10-CM

## 2021-08-16 LAB
ERYTHROCYTE [DISTWIDTH] IN BLOOD BY AUTOMATED COUNT: 13.9 % (ref 10–15)
HCT VFR BLD AUTO: 37.4 % (ref 40–53)
HGB BLD-MCNC: 12.1 G/DL (ref 13.3–17.7)
MCH RBC QN AUTO: 28.3 PG (ref 26.5–33)
MCHC RBC AUTO-ENTMCNC: 32.4 G/DL (ref 31.5–36.5)
MCV RBC AUTO: 87 FL (ref 78–100)
PLATELET # BLD AUTO: 175 10E3/UL (ref 150–450)
RBC # BLD AUTO: 4.28 10E6/UL (ref 4.4–5.9)
WBC # BLD AUTO: 5.4 10E3/UL (ref 4–11)

## 2021-08-16 PROCEDURE — 36415 COLL VENOUS BLD VENIPUNCTURE: CPT | Performed by: INTERNAL MEDICINE

## 2021-08-16 PROCEDURE — 80061 LIPID PANEL: CPT | Performed by: INTERNAL MEDICINE

## 2021-08-16 PROCEDURE — 85027 COMPLETE CBC AUTOMATED: CPT | Performed by: INTERNAL MEDICINE

## 2021-08-16 PROCEDURE — 84443 ASSAY THYROID STIM HORMONE: CPT | Performed by: INTERNAL MEDICINE

## 2021-08-16 PROCEDURE — 99397 PER PM REEVAL EST PAT 65+ YR: CPT | Performed by: INTERNAL MEDICINE

## 2021-08-16 PROCEDURE — 80053 COMPREHEN METABOLIC PANEL: CPT | Performed by: INTERNAL MEDICINE

## 2021-08-16 RX ORDER — LISINOPRIL 10 MG/1
10 TABLET ORAL DAILY
Qty: 90 TABLET | Refills: 4 | Status: SHIPPED | OUTPATIENT
Start: 2021-08-16 | End: 2022-08-22

## 2021-08-16 RX ORDER — METOPROLOL SUCCINATE 25 MG/1
TABLET, EXTENDED RELEASE ORAL
Qty: 45 TABLET | Refills: 4 | Status: SHIPPED | OUTPATIENT
Start: 2021-08-16 | End: 2022-08-22

## 2021-08-16 RX ORDER — ROSUVASTATIN CALCIUM 20 MG/1
20 TABLET, COATED ORAL DAILY
Qty: 90 TABLET | Refills: 4 | Status: SHIPPED | OUTPATIENT
Start: 2021-08-16 | End: 2022-08-22

## 2021-08-16 RX ORDER — AMLODIPINE BESYLATE 5 MG/1
5 TABLET ORAL DAILY
Qty: 90 TABLET | Refills: 4 | Status: SHIPPED | OUTPATIENT
Start: 2021-08-16 | End: 2022-08-22

## 2021-08-16 ASSESSMENT — ACTIVITIES OF DAILY LIVING (ADL): CURRENT_FUNCTION: NO ASSISTANCE NEEDED

## 2021-08-16 ASSESSMENT — MIFFLIN-ST. JEOR: SCORE: 1147.97

## 2021-08-16 NOTE — PROGRESS NOTES
"SUBJECTIVE:   Jessica Mckeon is a 89 year old male who presents for Preventive Visit.      Patient has been advised of split billing requirements and indicates understanding: Yes   Are you in the first 12 months of your Medicare coverage?  No    Healthy Habits:    In general, how would you rate your overall health?  Good    Frequency of exercise:  6-7 days/week    Duration of exercise:  15-30 minutes    Do you usually eat at least 4 servings of fruit and vegetables a day, include whole grains    & fiber and avoid regularly eating high fat or \"junk\" foods?  Yes    Taking medications regularly:  Yes    Barriers to taking medications:  None    Medication side effects:  None    Ability to successfully perform activities of daily living:  No assistance needed    Home Safety:  No safety concerns identified    Hearing Impairment:  Difficulty following a conversation in a noisy restaurant or crowded room, feel that people are mumbling or not speaking clearly, need to ask people to speak up or repeat themselves and difficulty understanding soft or whispered speech    In the past 6 months, have you been bothered by leaking of urine?  No    In general, how would you rate your overall mental or emotional health?  Good      PHQ-2 Total Score:    Additional concerns today:  No    Do you feel safe in your environment? Yes    Have you ever done Advance Care Planning? (For example, a Health Directive, POLST, or a discussion with a medical provider or your loved ones about your wishes): No, advance care planning information given to patient to review.  Patient plans to discuss their wishes with loved ones or provider.         Fall risk  Fallen 2 or more times in the past year?: No  Any fall with injury in the past year?: No    Cognitive Screening Unable to complete due to hearing difficulty    Do you have sleep apnea, excessive snoring or daytime drowsiness?: no    ASCVD, HTN, Hyperlipidemia. Tolerating medications well.  No " "cardiac sx such as CP, palpitations, PND, orthopnea, MADRID or peripheral edema.   Lab Results   Component Value Date    LDL 42 10/02/2018    LDL 52 06/29/2018     BP Readings from Last 3 Encounters:   08/16/21 132/80   09/16/19 124/60   10/02/18 99/64     Is fasting today for labwork.     Social History     Tobacco Use     Smoking status: Never Smoker     Smokeless tobacco: Never Used   Substance Use Topics     Alcohol use: No     Alcohol/week: 0.0 standard drinks         Current providers sharing in care for this patient include:   Patient Care Team:  Kale Thompson MD as PCP - General  Kale Thompson MD as Assigned PCP    The following health maintenance items are reviewed in Epic and correct as of today:  Health Maintenance Due   Topic Date Due     ANNUAL REVIEW OF  ORDERS  Never done     COVID-19 Vaccine (1) Never done     DTAP/TDAP/TD IMMUNIZATION (1 - Tdap) Never done     ZOSTER IMMUNIZATION (1 of 2) Never done     COLORECTAL CANCER SCREENING  01/28/2016     FALL RISK ASSESSMENT  06/29/2019     EYE EXAM  09/10/2019       OBJECTIVE:   /80 (BP Location: Right arm, Patient Position: Sitting, Cuff Size: Adult Regular)   Pulse 71   Ht 1.651 m (5' 5\")   Wt 56.6 kg (124 lb 12.8 oz)   SpO2 95%   BMI 20.77 kg/m   Estimated body mass index is 20.77 kg/m  as calculated from the following:    Height as of this encounter: 1.651 m (5' 5\").    Weight as of this encounter: 56.6 kg (124 lb 12.8 oz).  Physical Exam  GENERAL: healthy, alert and no distress  EYES: Eyes grossly normal to inspection, PERRL and conjunctivae and sclerae normal  HENT: ear canals and TM's normal, nose and mouth without ulcers or lesions  NECK: no adenopathy, no asymmetry, masses, or scars and thyroid normal to palpation  RESP: lungs clear to auscultation - no rales, rhonchi or wheezes  CV: regular rate and rhythm, normal S1 S2, no murmur, no peripheral edema and peripheral pulses 1-2+ william PT and DP  ABDOMEN: soft, nontender, bowel sounds " "normal  MS: no gross musculoskeletal defects noted, no edema  SKIN: no suspicious lesions or rashes  NEURO: Normal strength and tone, mentation intact and speech normal  PSYCH: mentation appears normal, affect normal/bright      ASSESSMENT / PLAN:       ICD-10-CM    1. Encounter for Medicare annual wellness exam  Z00.00 Lipid panel reflex to direct LDL Fasting     Comprehensive metabolic panel (BMP + Alb, Alk Phos, ALT, AST, Total. Bili, TP)     CBC with platelets     TSH with free T4 reflex   2. Hyperlipidemia LDL goal <70  E78.5 rosuvastatin (CRESTOR) 20 MG tablet     Lipid panel reflex to direct LDL Fasting     Comprehensive metabolic panel (BMP + Alb, Alk Phos, ALT, AST, Total. Bili, TP)   3. ASCVD (arteriosclerotic cardiovascular disease)  I25.10 metoprolol succinate ER (TOPROL-XL) 25 MG 24 hr tablet     lisinopril (ZESTRIL) 10 MG tablet     amLODIPine (NORVASC) 5 MG tablet     CBC with platelets     TSH with free T4 reflex   4. Essential hypertension  I10 metoprolol succinate ER (TOPROL-XL) 25 MG 24 hr tablet     lisinopril (ZESTRIL) 10 MG tablet     amLODIPine (NORVASC) 5 MG tablet     CBC with platelets     TSH with free T4 reflex         COUNSELING:  Reviewed preventive health counseling, as reflected in patient instructions    Estimated body mass index is 20.77 kg/m  as calculated from the following:    Height as of this encounter: 1.651 m (5' 5\").    Weight as of this encounter: 56.6 kg (124 lb 12.8 oz).    Weight management plan noted, stable and monitoring    He reports that he has never smoked. He has never used smokeless tobacco.      Appropriate preventive services were discussed with this patient, including applicable screening as appropriate for cardiovascular disease, diabetes, osteopenia/osteoporosis, and glaucoma.  As appropriate for age/gender, discussed screening for colorectal cancer, prostate cancer. Checklist reviewing preventive services available has been given to the patient.    Reviewed " patients plan of care and provided an AVS. The Basic Care Plan (routine screening as documented in Health Maintenance) for Jessica meets the Care Plan requirement. This Care Plan has been established and reviewed with the Patient.    Counseling Resources:  ATP IV Guidelines  Pooled Cohorts Equation Calculator  Breast Cancer Risk Calculator  Breast Cancer: Medication to Reduce Risk  FRAX Risk Assessment  ICSI Preventive Guidelines  Dietary Guidelines for Americans, 2010  Exposed Vocals's MyPlate  ASA Prophylaxis  Lung CA Screening    Kale Thompson MD  [facility]    Identified Health Risks:

## 2021-08-16 NOTE — LETTER
August 19, 2021      [facility] [address]  [address] [place] [address]        Dear ,    We are writing to inform you of your test results.    1. Your TSH (thyroid function) is normal.     2. Your cholesterol profile shows overall good results. Your LDL level is slightly higher than your goal, but you are already taking a high dose of rosuvastatin. No changes are needed with your medication dose.     Your Lipid Goals:   Cholesterol: Desirable is less than 200, Borderline is 200-240   HDL (Good Cholesterol): Desirable is greater than 40   LDL (Bad Cholesterol): Desirable is less than 100, Borderline 100-130   Triglycerides (Fats in the Blood): Desirable is less than 150,  Borderline is 150-200     3. Your metabolic panel, including liver and kidney function, glucose (blood sugar) and electrolytes, is all normal.     4. Your blood counts show that you are mildly anemic (low red blood count) but the level is similar to previous checks so is not worrisome. No additional evaluation is needed at this time.       Please feel free to contact me if you have any questions or concerns.       Resulted Orders   Lipid panel reflex to direct LDL Fasting   Result Value Ref Range    Cholesterol 199 <200 mg/dL      Comment:      Age 0-19 years  Desirable: <170 mg/dL  Borderline high:  170-199 mg/dl  High:            >199 mg/dl    Age 20 years and older  Desirable: <200 mg/dL    Triglycerides 87 <150 mg/dL      Comment:      0-9 years:  Normal:    Less than 75 mg/dL  Borderline high:  75-99 mg/dL  High:             Greater than or equal to 100 mg/dL    0-19 years:  Normal:    Less than 90 mg/dL  Borderline high:   mg/dL  High:             Greater than or equal to 130 mg/dL    20 years and older:  Normal:    Less than 150 mg/dL  Borderline high:  150-199 mg/dL  High:             200-499 mg/dL  Very high:   Greater than or equal to 500 mg/dL    Direct Measure HDL 53 >=40 mg/dL      Comment:      0-19 years:        Greater than or equal to 45 mg/dL   Low: Less than 40 mg/dL   Borderline low: 40-44 mg/dL     20 years and older:   Female: Greater than or equal to 50 mg/dL   Male:   Greater than or equal to 40 mg/dL         LDL Cholesterol Calculated 129 (H) <=100 mg/dL      Comment:      Age 0-19 years:  Desirable: 0-110 mg/dL   Borderline high: 110-129 mg/dL   High: >= 130 mg/dL    Age 20 years and older:  Desirable: <100mg/dL  Above desirable: 100-129 mg/dL   Borderline high: 130-159 mg/dL   High: 160-189 mg/dL   Very high: >= 190 mg/dL    Non HDL Cholesterol 146 (H) <130 mg/dL      Comment:      0-19 years:  Desirable:          Less than 120 mg/dL  Borderline high:   120-144 mg/dL  High:                   Greater than or equal to 145 mg/dL    20 years and older:  Desirable:          130 mg/dL  Above Desirable: 130-159 mg/dL  Borderline high:   160-189 mg/dL  High:               190-219 mg/dL  Very high:     Greater than or equal to 220 mg/dL    Patient Fasting > 8hrs? Yes    Comprehensive metabolic panel (BMP + Alb, Alk Phos, ALT, AST, Total. Bili, TP)   Result Value Ref Range    Sodium 137 133 - 144 mmol/L    Potassium 3.7 3.4 - 5.3 mmol/L    Chloride 104 94 - 109 mmol/L    Carbon Dioxide (CO2) 30 20 - 32 mmol/L    Anion Gap 3 3 - 14 mmol/L    Urea Nitrogen 24 7 - 30 mg/dL    Creatinine 1.03 0.66 - 1.25 mg/dL    Calcium 9.3 8.5 - 10.1 mg/dL    Glucose 95 70 - 99 mg/dL    Alkaline Phosphatase 44 40 - 150 U/L    AST 22 0 - 45 U/L    ALT 20 0 - 70 U/L    Protein Total 7.2 6.8 - 8.8 g/dL    Albumin 4.1 3.4 - 5.0 g/dL    Bilirubin Total 0.5 0.2 - 1.3 mg/dL    GFR Estimate 63 >60 mL/min/1.73m2      Comment:      As of July 11, 2021, eGFR is calculated by the CKD-EPI creatinine equation, without race adjustment. eGFR can be influenced by muscle mass, exercise, and diet. The reported eGFR is an estimation only and is only applicable if the renal function is stable.   CBC with platelets   Result Value Ref Range    WBC Count 5.4 4.0  - 11.0 10e3/uL    RBC Count 4.28 (L) 4.40 - 5.90 10e6/uL    Hemoglobin 12.1 (L) 13.3 - 17.7 g/dL    Hematocrit 37.4 (L) 40.0 - 53.0 %    MCV 87 78 - 100 fL    MCH 28.3 26.5 - 33.0 pg    MCHC 32.4 31.5 - 36.5 g/dL    RDW 13.9 10.0 - 15.0 %    Platelet Count 175 150 - 450 10e3/uL   TSH with free T4 reflex   Result Value Ref Range    TSH 3.21 0.40 - 4.00 mU/L       If you have any questions or concerns, please call the clinic at the number listed above.       Sincerely,      Kale Thompson MD

## 2021-08-16 NOTE — PATIENT INSTRUCTIONS
Patient Education   Personalized Prevention Plan  You are due for the preventive services outlined below.  Your care team is available to assist you in scheduling these services.  If you have already completed any of these items, please share that information with your care team to update in your medical record.  Health Maintenance Due   Topic Date Due     ANNUAL REVIEW OF HM ORDERS  Never done     Discuss Advance Care Planning  Never done     COVID-19 Vaccine (1) Never done     Diptheria Tetanus Pertussis (DTAP/TDAP/TD) Vaccine (1 - Tdap) Never done     Zoster (Shingles) Vaccine (1 of 2) Never done     Colorectal Cancer Screening  01/28/2016     Annual Wellness Visit  06/29/2019     FALL RISK ASSESSMENT  06/29/2019     Eye Exam  09/10/2019     PHQ-2  01/01/2021

## 2021-08-17 LAB
ALBUMIN SERPL-MCNC: 4.1 G/DL (ref 3.4–5)
ALP SERPL-CCNC: 44 U/L (ref 40–150)
ALT SERPL W P-5'-P-CCNC: 20 U/L (ref 0–70)
ANION GAP SERPL CALCULATED.3IONS-SCNC: 3 MMOL/L (ref 3–14)
AST SERPL W P-5'-P-CCNC: 22 U/L (ref 0–45)
BILIRUB SERPL-MCNC: 0.5 MG/DL (ref 0.2–1.3)
BUN SERPL-MCNC: 24 MG/DL (ref 7–30)
CALCIUM SERPL-MCNC: 9.3 MG/DL (ref 8.5–10.1)
CHLORIDE BLD-SCNC: 104 MMOL/L (ref 94–109)
CHOLEST SERPL-MCNC: 199 MG/DL
CO2 SERPL-SCNC: 30 MMOL/L (ref 20–32)
CREAT SERPL-MCNC: 1.03 MG/DL (ref 0.66–1.25)
FASTING STATUS PATIENT QL REPORTED: YES
GFR SERPL CREATININE-BSD FRML MDRD: 63 ML/MIN/1.73M2
GLUCOSE BLD-MCNC: 95 MG/DL (ref 70–99)
HDLC SERPL-MCNC: 53 MG/DL
LDLC SERPL CALC-MCNC: 129 MG/DL
NONHDLC SERPL-MCNC: 146 MG/DL
POTASSIUM BLD-SCNC: 3.7 MMOL/L (ref 3.4–5.3)
PROT SERPL-MCNC: 7.2 G/DL (ref 6.8–8.8)
SODIUM SERPL-SCNC: 137 MMOL/L (ref 133–144)
TRIGL SERPL-MCNC: 87 MG/DL
TSH SERPL DL<=0.005 MIU/L-ACNC: 3.21 MU/L (ref 0.4–4)

## 2021-10-18 ENCOUNTER — TRANSFERRED RECORDS (OUTPATIENT)
Dept: HEALTH INFORMATION MANAGEMENT | Facility: CLINIC | Age: 86
End: 2021-10-18

## 2022-01-01 NOTE — PROGRESS NOTES
HPI and Plan:   See dictation    Orders Placed This Encounter   Procedures     Basic metabolic panel     Lipid Profile     ALT     Follow-Up with Cardiologist       Orders Placed This Encounter   Medications     UNKNOWN TO PATIENT     Sig: Eye drops for leakage in eyes       There are no discontinued medications.      Encounter Diagnoses   Name Primary?     Essential hypertension with goal blood pressure less than 140/90 Yes     Coronary artery disease involving native coronary artery of native heart without angina pectoris      Dizziness        CURRENT MEDICATIONS:  Current Outpatient Prescriptions   Medication Sig Dispense Refill     alfuzosin (UROXATRAL) 10 MG 24 hr tablet TAKE ONE TABLET BY MOUTH ONCE DAILY 90 tablet 3     amLODIPine (NORVASC) 5 MG tablet Take 1 tablet (5 mg) by mouth daily 90 tablet 2     aspirin 81 MG tablet Take 81 mg by mouth daily       atorvastatin (LIPITOR) 40 MG tablet Take 1 tablet (40 mg) by mouth daily 90 tablet 1     finasteride (PROSCAR) 5 MG tablet TAKE ONE TABLET BY MOUTH ONCE DAILY 90 tablet 3     fluticasone (FLONASE) 50 MCG/ACT nasal spray Spray 1-2 sprays into both nostrils daily 16 g 3     hydrocortisone (ANUSOL-HC) 25 MG Suppository Place 25 mg rectally       LANSOPRAZOLE PO Take 30 mg by mouth daily       metoprolol (TOPROL-XL) 25 MG 24 hr tablet Take 0.5 tablets (12.5 mg) by mouth daily 45 tablet 2     pantoprazole (PROTONIX) 40 MG EC tablet Take 40 mg by mouth       RABEprazole (ACIPHEX) 20 MG EC tablet TAKE ONE TABLET BY MOUTH ONCE DAILY 90 tablet 3     rOPINIRole (REQUIP) 0.5 MG tablet TAKE ONE TABLET BY MOUTH AT BEDTIME 90 tablet 3     rosuvastatin (CRESTOR) 20 MG tablet TAKE ONE TABLET BY MOUTH ONCE DAILY FOR CHOLESTEROL 90 tablet 3     tolterodine (DETROL LA) 4 MG 24 hr capsule TAKE ONE CAPSULE BY MOUTH AT BEDTIME 90 capsule 3     UNKNOWN TO PATIENT Eye drops for leakage in eyes       Calcium Carb-Cholecalciferol (CALCIUM 600 + D PO) With magnesium and zinc        lisinopril (PRINIVIL/ZESTRIL) 10 MG tablet Take 1 tablet (10 mg) by mouth daily (Patient not taking: Reported on 10/2/2018) 90 tablet 2     order for DME Auto-CPAP, setting 5-15 cm H2O. Sleep study 10/2016. RDI 24.7. AHI 15.5. REM AHI 37.6. (Patient not taking: Reported on 10/2/2018) 1 each 0     order for DME CPAP tubing, mask and necessary supplies (Patient not taking: Reported on 10/2/2018) 1 each 0     order for DME Jobst Stockings. Knee high. Medium compression (20-30 mmHg). Dispense 2 pairs. (Patient not taking: Reported on 10/2/2018) 2 each 5     sennosides (SENNA) 8.6 MG tablet Take 1 tablet by mouth as needed  120 each      traMADol (ULTRAM) 50 MG tablet   2       ALLERGIES   No Known Allergies    PAST MEDICAL HISTORY:  Past Medical History:   Diagnosis Date     Anxiety      Arthritis     mild in neck     Benign neoplasm of colon 12/2007    colonic polyps     BPPV (benign paroxysmal positional vertigo) 9/16/2008     Coronary artery disease     3/11: PTCA & JACK to prox Cfx     Gastro-oesophageal reflux disease      Heart attack (H) 4/29/2016     Hyperlipidemia      Near syncope      NED (obstructive sleep apnea) 11/13/2017     Palpitations      Syncope      Unspecified essential hypertension      Urinary sys symptom NEC     BPH sx     Vertigo        PAST SURGICAL HISTORY:  Past Surgical History:   Procedure Laterality Date     BACK SURGERY  1993     CATARACT IOL, RT/LT  4/14/10    Right     CORONARY ANGIOGRAPHY ADULT ORDER  3/22/11     HEART CATH, ANGIOPLASTY  3/22/11    PTCA & JACK to prox Cfx     HYDROCELECTOMY INGUINAL  9/4/2012    Procedure: HYDROCELECTOMY INGUINAL;  Left Hydrocelectomy;  Surgeon: Wili Yan MD;  Location: [facility] OR     TESTICLE SURGERY         FAMILY HISTORY:  Family History   Problem Relation Age of Onset     Diabetes Mother      HEART DISEASE Father 97     Glaucoma No family hx of      Macular Degeneration No family hx of        SOCIAL HISTORY:  Social History     Social History      "Marital status:      Spouse name: N/A     Number of children: N/A     Years of education: N/A     Social History Main Topics     Smoking status: Never Smoker     Smokeless tobacco: Never Used     Alcohol use No     Drug use: No     Sexual activity: Not Currently     Other Topics Concern     Caffeine Concern Yes     green tea     Sleep Concern Yes     night munoz      Stress Concern Yes     worries about every thing     Special Diet No     watching sodium intake     Exercise Yes     walking 20 minutes daily     Social History Narrative       Review of Systems:  Skin:  Negative       Eyes:  Positive for glasses some leakage of left eye   ENT:  Positive for hearing loss hearing aids, Hx sinus infections   Respiratory:  Positive for dyspnea on exertion     Cardiovascular:    lightheadedness;chest pain;Positive for;fatigue;edema lightheaded after hot shower/bath and when BP drops below 100 and slight chest pain with SOB and exertion   Gastroenterology: Positive for reflux controlled with medication  Genitourinary:  Positive for urinary frequency goes every 2 hours   Musculoskeletal:  Positive for back pain    Neurologic:  Negative      Psychiatric:  Positive for depression takes meds to controll the depression   Heme/Lymph/Imm:    easy bruising    Endocrine:  Negative        Physical Exam:  Vitals: BP 99/64 (BP Location: Right arm, Patient Position: Sitting, Cuff Size: Adult Regular)  Pulse 54  Ht 1.676 m (5' 6\")  Wt 61.7 kg (136 lb)  SpO2 97%  BMI 21.95 kg/m2    Constitutional:  cooperative, alert and oriented, well developed, well nourished, in no acute distress        Skin:  warm and dry to the touch, no apparent skin lesions or masses noted          Head:  normocephalic, no masses or lesions        Eyes:  pupils equal and round, conjunctivae and lids unremarkable, sclera white, no xanthalasma, EOMS intact, no nystagmus        Lymph:      ENT:  no pallor or cyanosis, dentition good;no pallor or cyanosis " hearing aide(s) present      Neck:  carotid pulses are full and equal bilaterally, JVP normal, no carotid bruit    (Right carotid bruit.)    Respiratory:  normal breath sounds, clear to auscultation, normal A-P diameter, normal symmetry, normal respiratory excursion, no use of accessory muscles         Cardiac: regular rhythm;normal S1 and S2;apical impulse not displaced       systolic ejection murmur;RUSB;grade 2;radiation to the carotid        pulses full and equal, no bruits auscultated                                        GI:  abdomen soft, non-tender, BS normoactive, no mass, no HSM, no bruits        Extremities and Muscular Skeletal:  no deformities, clubbing, cyanosis, erythema observed;no edema              Neurological:  no gross motor deficits;affect appropriate        Psych:  Alert and Oriented x 3        CC  Kale Thompson MD  [address] [place] [address]               Previously Negative (within the last year)

## 2022-06-14 ENCOUNTER — OFFICE VISIT (OUTPATIENT)
Dept: OPTOMETRY | Facility: CLINIC | Age: 87
End: 2022-06-14
Payer: COMMERCIAL

## 2022-06-14 DIAGNOSIS — H52.03 HYPEROPIA OF BOTH EYES WITH ASTIGMATISM: Primary | ICD-10-CM

## 2022-06-14 DIAGNOSIS — H52.4 PRESBYOPIA: ICD-10-CM

## 2022-06-14 DIAGNOSIS — H01.003 BLEPHARITIS OF BOTH EYES, UNSPECIFIED EYELID, UNSPECIFIED TYPE: ICD-10-CM

## 2022-06-14 DIAGNOSIS — H01.006 BLEPHARITIS OF BOTH EYES, UNSPECIFIED EYELID, UNSPECIFIED TYPE: ICD-10-CM

## 2022-06-14 DIAGNOSIS — H52.203 HYPEROPIA OF BOTH EYES WITH ASTIGMATISM: Primary | ICD-10-CM

## 2022-06-14 DIAGNOSIS — Z96.1 PSEUDOPHAKIA OF BOTH EYES: ICD-10-CM

## 2022-06-14 PROCEDURE — 92004 COMPRE OPH EXAM NEW PT 1/>: CPT | Performed by: OPTOMETRIST

## 2022-06-14 PROCEDURE — 92015 DETERMINE REFRACTIVE STATE: CPT | Performed by: OPTOMETRIST

## 2022-06-14 ASSESSMENT — REFRACTION_MANIFEST
OS_CYLINDER: +3.50
OS_CYLINDER: +3.50
OD_AXIS: 178
OS_AXIS: 004
OD_SPHERE: -1.00
METHOD_AUTOREFRACTION: 1
OD_SPHERE: -1.25
OS_ADD: +2.50
OD_AXIS: 180
OS_SPHERE: -1.25
OD_CYLINDER: +3.00
OS_SPHERE: -1.00
OD_ADD: +2.50
OS_AXIS: 002
OD_CYLINDER: +3.00

## 2022-06-14 ASSESSMENT — KERATOMETRY
OS_AXISANGLE2_DEGREES: 101
OD_K1POWER_DIOPTERS: 42.62
OD_AXISANGLE_DEGREES: 1810
OS_K2POWER_DIOPTERS: 45
OD_AXISANGLE2_DEGREES: 90
METHOD_AUTO_MANUAL: AUTOMATED
OS_AXISANGLE_DEGREES: 11
OS_K1POWER_DIOPTERS: 42
OD_K2POWER_DIOPTERS: 45.37

## 2022-06-14 ASSESSMENT — TONOMETRY
IOP_METHOD: APPLANATION
OS_IOP_MMHG: 15
OD_IOP_MMHG: 15

## 2022-06-14 ASSESSMENT — EXTERNAL EXAM - LEFT EYE: OS_EXAM: MULTIPLE NEVI

## 2022-06-14 ASSESSMENT — CONF VISUAL FIELD
METHOD: COUNTING FINGERS
OD_NORMAL: 1
OS_NORMAL: 1

## 2022-06-14 ASSESSMENT — REFRACTION_WEARINGRX
OS_SPHERE: -1.25
SPECS_TYPE: BIFOCAL
OS_AXIS: 007
OD_ADD: +2.50
OS_ADD: +2.50
OD_CYLINDER: +2.25
OS_CYLINDER: +1.50
OD_AXIS: 163
OD_SPHERE: -0.75

## 2022-06-14 ASSESSMENT — VISUAL ACUITY
CORRECTION_TYPE: GLASSES
OS_CC: 20/40
METHOD: SNELLEN - LINEAR
OS_SC: 20/80
OD_CC: 20/60
OD_CC: 20/50
OS_CC: 20/60
OD_SC: 20/200

## 2022-06-14 ASSESSMENT — CUP TO DISC RATIO
OS_RATIO: 0.35
OD_RATIO: 0.45

## 2022-06-14 NOTE — LETTER
6/14/2022         RE: Jessica Mckeon  [address] [place] [address]        Dear Colleague,    Thank you for referring your patient, Jessica Mckeon, to the [facility]. Please see a copy of my visit note below.    Chief Complaint   Patient presents with     Annual Eye Exam    needs new glasses falling apart  Accompanied by wife who is helping interpret and is his caregiver  Last Eye Exam: 09/10/2018  Dilated Previously: Yes, side effects of dilation explained today    What are you currently using to see?  glasses       Distance Vision Acuity: Noticed gradual change in both eyes    Near Vision Acuity: Satisfied with vision while reading and using computer with glasses    Eye Comfort: dry and itchy  Do you use eye drops? : Yes: OTC artificial tears when needed      Jadyn Harris - Optometric Assistant           Medical, surgical and family histories reviewed and updated 6/14/2022.       OBJECTIVE: See Ophthalmology exam    ASSESSMENT:    ICD-10-CM    1. Hyperopia of both eyes with astigmatism  H52.03     H52.203    2. Presbyopia  H52.4    3. Pseudophakia of both eyes  Z96.1    4. Blepharitis of both eyes, unspecified eyelid, unspecified type  H01.003     H01.006        PLAN:   Lid hygiene discussed   Bifocal     Anila Lehman OD         Again, thank you for allowing me to participate in the care of your patient.        Sincerely,        Anila Lehman, OD

## 2022-06-14 NOTE — PROGRESS NOTES
Chief Complaint   Patient presents with     Annual Eye Exam    needs new glasses falling apart  Accompanied by wife who is helping interpret and is his caregiver  Last Eye Exam: 09/10/2018  Dilated Previously: Yes, side effects of dilation explained today    What are you currently using to see?  glasses       Distance Vision Acuity: Noticed gradual change in both eyes    Near Vision Acuity: Satisfied with vision while reading and using computer with glasses    Eye Comfort: dry and itchy  Do you use eye drops? : Yes: OTC artificial tears when needed      Jadyn Harris - Optometric Assistant           Medical, surgical and family histories reviewed and updated 6/14/2022.       OBJECTIVE: See Ophthalmology exam    ASSESSMENT:    ICD-10-CM    1. Hyperopia of both eyes with astigmatism  H52.03     H52.203    2. Presbyopia  H52.4    3. Pseudophakia of both eyes  Z96.1    4. Blepharitis of both eyes, unspecified eyelid, unspecified type  H01.003     H01.006        PLAN:   Lid hygiene discussed   Bifocal     Anila Lehman OD

## 2022-06-21 ENCOUNTER — OFFICE VISIT (OUTPATIENT)
Dept: PEDIATRICS | Facility: CLINIC | Age: 87
End: 2022-06-21
Payer: COMMERCIAL

## 2022-06-21 VITALS
WEIGHT: 126 LBS | RESPIRATION RATE: 16 BRPM | TEMPERATURE: 97.8 F | SYSTOLIC BLOOD PRESSURE: 134 MMHG | BODY MASS INDEX: 20.99 KG/M2 | OXYGEN SATURATION: 98 % | HEIGHT: 65 IN | HEART RATE: 64 BPM | DIASTOLIC BLOOD PRESSURE: 84 MMHG

## 2022-06-21 DIAGNOSIS — R60.9 EDEMA, UNSPECIFIED TYPE: ICD-10-CM

## 2022-06-21 DIAGNOSIS — L98.9 SKIN LESION: Primary | ICD-10-CM

## 2022-06-21 PROCEDURE — 99214 OFFICE O/P EST MOD 30 MIN: CPT | Performed by: INTERNAL MEDICINE

## 2022-06-21 ASSESSMENT — PAIN SCALES - GENERAL: PAINLEVEL: NO PAIN (0)

## 2022-06-21 NOTE — PATIENT INSTRUCTIONS
Call to schedule with Dermatology     Edema- elevate legs when sitting and wear compression stockings during the day.

## 2022-06-21 NOTE — PROGRESS NOTES
Assessment & Plan     Skin lesion  Large atypical nevus.  Referred to dermatology for biopsy  - Adult Dermatology Referral; Future    Edema, unspecified type  Dependent edema, calcium channel blocker likely contributing.  Recommended elevating legs when sitting, resume compression stockings    Follow-up: Patient will return for annual physical in August.    Antonio Martin MD  [facility] is a 89 year old, presenting for the following health issues:    HPI     Presents today with 2 concerns.  Main concern is a skin lesion right side of the nose which seems to have increased in size over the past several months.    Also notes intermittent mild swelling of both ankles.  Tends to be worse throughout the day and when sitting for longer periods, improves at night.  Medication review: Currently takes amlodipine for hypertension.    Patient Active Problem List   Diagnosis     Anxiety state     Esophageal reflux     BPPV (benign paroxysmal positional vertigo)     Essential hypertension     Constipation     Benign prostatic hyperplasia with nocturia     Hyperlipidemia LDL goal <70     Internal hemorrhoids     Coronary artery disease     Palpitations     Hyperlipidemia     Heart attack (H)     Near syncope     Varicose veins of legs     Restless leg syndrome     NED (obstructive sleep apnea)     Dry eyes     Blepharitis of upper eyelids of both eyes, unspecified type     Pseudophakia     Current Outpatient Medications   Medication Sig Dispense Refill     amLODIPine (NORVASC) 5 MG tablet Take 1 tablet (5 mg) by mouth daily 90 tablet 4     aspirin 81 MG tablet Take 81 mg by mouth daily       Calcium Carb-Cholecalciferol (CALCIUM 600 + D PO) With magnesium and zinc       fluticasone (FLONASE) 50 MCG/ACT nasal spray Spray 1-2 sprays into both nostrils daily 16 g 3     hydrocortisone (ANUSOL-HC) 25 MG Suppository Place 25 mg rectally       lisinopril (ZESTRIL) 10 MG tablet Take 1 tablet  "(10 mg) by mouth daily 90 tablet 4     metoprolol succinate ER (TOPROL-XL) 25 MG 24 hr tablet TAKE 1/2 TABLET BY MOUTH DAILY 45 tablet 4     order for DME Auto-CPAP, setting 5-15 cm H2O. Sleep study 10/2016. RDI 24.7. AHI 15.5. REM AHI 37.6. 1 each 0     order for DME CPAP tubing, mask and necessary supplies 1 each 0     order for DME Jobst Stockings. Knee high. Medium compression (20-30 mmHg). Dispense 2 pairs. 2 each 5     rosuvastatin (CRESTOR) 20 MG tablet Take 1 tablet (20 mg) by mouth daily 90 tablet 4     sennosides (SENNA) 8.6 MG tablet Take 1 tablet by mouth as needed  120 each      UNKNOWN TO PATIENT Eye drops for leakage in eyes            Objective    Pulse 64   Temp 97.8  F (36.6  C) (Tympanic)   Resp 16   Ht 1.651 m (5' 5\")   Wt 57.2 kg (126 lb)   SpO2 98%   BMI 20.97 kg/m    Body mass index is 20.97 kg/m .  Physical Exam   GENERAL: healthy, alert and no distress  HENT: Brown exophytic nevus with irregular border approximately 2 cm right side nasal bridge.  MS: no edema  SKIN: no suspicious lesions or rashes  PSYCH: affect normal/bright            .  ..  "

## 2022-06-22 ENCOUNTER — APPOINTMENT (OUTPATIENT)
Dept: OPTOMETRY | Facility: CLINIC | Age: 87
End: 2022-06-22
Payer: COMMERCIAL

## 2022-06-22 PROCEDURE — 92341 FIT SPECTACLES BIFOCAL: CPT | Performed by: OPTOMETRIST

## 2022-08-16 ENCOUNTER — TELEPHONE (OUTPATIENT)
Dept: PEDIATRICS | Facility: CLINIC | Age: 87
End: 2022-08-16

## 2022-08-17 NOTE — TELEPHONE ENCOUNTER
Medication listed as discontinued.   RABEprazole (ACIPHEX) 20 MG EC tablet (Discontinued) 90 tablet 3 3/21/2018 9/16/2019 No   Sig: TAKE ONE TABLET BY MOUTH ONCE DAILY   Sent to pharmacy as: RABEprazole (ACIPHEX) 20 MG EC tablet   Class: E-Prescribe   Reason for Discontinue: Stopped by Patient   Order: [id number]   E-Prescribing Status: Receipt confirmed by pharmacy (3/21/2018 12:44 PM CDT)     Informed pharmacy.     Marco Antonio LEON RN

## 2022-08-22 ENCOUNTER — OFFICE VISIT (OUTPATIENT)
Dept: PEDIATRICS | Facility: CLINIC | Age: 87
End: 2022-08-22
Payer: COMMERCIAL

## 2022-08-22 VITALS
BODY MASS INDEX: 21.34 KG/M2 | OXYGEN SATURATION: 99 % | DIASTOLIC BLOOD PRESSURE: 52 MMHG | SYSTOLIC BLOOD PRESSURE: 114 MMHG | TEMPERATURE: 96.7 F | HEIGHT: 65 IN | RESPIRATION RATE: 18 BRPM | HEART RATE: 65 BPM | WEIGHT: 128.1 LBS

## 2022-08-22 DIAGNOSIS — Z00.00 ENCOUNTER FOR MEDICARE ANNUAL WELLNESS EXAM: Primary | ICD-10-CM

## 2022-08-22 DIAGNOSIS — I10 ESSENTIAL HYPERTENSION: ICD-10-CM

## 2022-08-22 DIAGNOSIS — E78.5 HYPERLIPIDEMIA LDL GOAL <70: ICD-10-CM

## 2022-08-22 DIAGNOSIS — K21.9 GASTROESOPHAGEAL REFLUX DISEASE WITHOUT ESOPHAGITIS: ICD-10-CM

## 2022-08-22 DIAGNOSIS — I25.10 ASCVD (ARTERIOSCLEROTIC CARDIOVASCULAR DISEASE): ICD-10-CM

## 2022-08-22 LAB
ALBUMIN SERPL-MCNC: 3.7 G/DL (ref 3.4–5)
ALP SERPL-CCNC: 53 U/L (ref 40–150)
ALT SERPL W P-5'-P-CCNC: 21 U/L (ref 0–70)
ANION GAP SERPL CALCULATED.3IONS-SCNC: 6 MMOL/L (ref 3–14)
AST SERPL W P-5'-P-CCNC: 26 U/L (ref 0–45)
BILIRUB SERPL-MCNC: 0.3 MG/DL (ref 0.2–1.3)
BUN SERPL-MCNC: 23 MG/DL (ref 7–30)
CALCIUM SERPL-MCNC: 9.2 MG/DL (ref 8.5–10.1)
CHLORIDE BLD-SCNC: 107 MMOL/L (ref 94–109)
CHOLEST SERPL-MCNC: 115 MG/DL
CO2 SERPL-SCNC: 26 MMOL/L (ref 20–32)
CREAT SERPL-MCNC: 1.02 MG/DL (ref 0.66–1.25)
ERYTHROCYTE [DISTWIDTH] IN BLOOD BY AUTOMATED COUNT: 13.8 % (ref 10–15)
FASTING STATUS PATIENT QL REPORTED: YES
GFR SERPL CREATININE-BSD FRML MDRD: 69 ML/MIN/1.73M2
GLUCOSE BLD-MCNC: 100 MG/DL (ref 70–99)
HCT VFR BLD AUTO: 35.4 % (ref 40–53)
HDLC SERPL-MCNC: 52 MG/DL
HGB BLD-MCNC: 11.4 G/DL (ref 13.3–17.7)
LDLC SERPL CALC-MCNC: 49 MG/DL
MCH RBC QN AUTO: 28.3 PG (ref 26.5–33)
MCHC RBC AUTO-ENTMCNC: 32.2 G/DL (ref 31.5–36.5)
MCV RBC AUTO: 88 FL (ref 78–100)
NONHDLC SERPL-MCNC: 63 MG/DL
PLATELET # BLD AUTO: 160 10E3/UL (ref 150–450)
POTASSIUM BLD-SCNC: 3.8 MMOL/L (ref 3.4–5.3)
PROT SERPL-MCNC: 7 G/DL (ref 6.8–8.8)
RBC # BLD AUTO: 4.03 10E6/UL (ref 4.4–5.9)
SODIUM SERPL-SCNC: 139 MMOL/L (ref 133–144)
TRIGL SERPL-MCNC: 70 MG/DL
WBC # BLD AUTO: 6.5 10E3/UL (ref 4–11)

## 2022-08-22 PROCEDURE — 36415 COLL VENOUS BLD VENIPUNCTURE: CPT | Performed by: INTERNAL MEDICINE

## 2022-08-22 PROCEDURE — 99214 OFFICE O/P EST MOD 30 MIN: CPT | Mod: 25 | Performed by: INTERNAL MEDICINE

## 2022-08-22 PROCEDURE — 80053 COMPREHEN METABOLIC PANEL: CPT | Performed by: INTERNAL MEDICINE

## 2022-08-22 PROCEDURE — G0439 PPPS, SUBSEQ VISIT: HCPCS | Performed by: INTERNAL MEDICINE

## 2022-08-22 PROCEDURE — 85027 COMPLETE CBC AUTOMATED: CPT | Performed by: INTERNAL MEDICINE

## 2022-08-22 PROCEDURE — 80061 LIPID PANEL: CPT | Performed by: INTERNAL MEDICINE

## 2022-08-22 RX ORDER — ROSUVASTATIN CALCIUM 20 MG/1
20 TABLET, COATED ORAL DAILY
Qty: 90 TABLET | Refills: 4 | Status: SHIPPED | OUTPATIENT
Start: 2022-08-22 | End: 2023-05-31

## 2022-08-22 RX ORDER — LISINOPRIL 10 MG/1
10 TABLET ORAL DAILY
Qty: 90 TABLET | Refills: 4 | Status: SHIPPED | OUTPATIENT
Start: 2022-08-22 | End: 2023-05-31

## 2022-08-22 RX ORDER — PANTOPRAZOLE SODIUM 20 MG/1
40 TABLET, DELAYED RELEASE ORAL DAILY
Qty: 90 TABLET | Refills: 4 | Status: SHIPPED | OUTPATIENT
Start: 2022-08-22 | End: 2023-02-17

## 2022-08-22 RX ORDER — AMLODIPINE BESYLATE 5 MG/1
5 TABLET ORAL DAILY
Qty: 90 TABLET | Refills: 4 | Status: SHIPPED | OUTPATIENT
Start: 2022-08-22 | End: 2023-05-31

## 2022-08-22 RX ORDER — METOPROLOL SUCCINATE 25 MG/1
TABLET, EXTENDED RELEASE ORAL
Qty: 45 TABLET | Refills: 4 | Status: SHIPPED | OUTPATIENT
Start: 2022-08-22 | End: 2023-05-31

## 2022-08-22 ASSESSMENT — ACTIVITIES OF DAILY LIVING (ADL)
CURRENT_FUNCTION: TRANSPORTATION REQUIRES ASSISTANCE
CURRENT_FUNCTION: TELEPHONE REQUIRES ASSISTANCE
CURRENT_FUNCTION: LAUNDRY REQUIRES ASSISTANCE
CURRENT_FUNCTION: HOUSEWORK REQUIRES ASSISTANCE
CURRENT_FUNCTION: PREPARING MEALS REQUIRES ASSISTANCE
CURRENT_FUNCTION: SHOPPING REQUIRES ASSISTANCE

## 2022-08-22 NOTE — LETTER
August 22, 2022      Jessica Mckeon  [address] [place] [address]        Jessica:     Your tests are all complete. The results show:     1. Your cholesterol results look great!     Your Lipid Goals:   Cholesterol: Desirable is less than 200, Borderline is 200-240   HDL (Good Cholesterol): Desirable is greater than 40   LDL (Bad Cholesterol): Desirable is less than 100, Borderline 100-130   Triglycerides (Fats in the Blood): Desirable is less than 150,  Borderline is 150-200     2. Your metabolic panel, including liver and kidney function, glucose (blood sugar) and electrolytes, is essentially normal. The glucose at 100 is not worrisome.     3. Your blood counts are similar to prior checks. Your hemoglobin (red blood count) is slightly lower than the last check, but is is also not worrisome.     It was nice to see you!     Please feel free to contact me if you have any questions or concerns.       Kale Thompson       Resulted Orders   Comprehensive metabolic panel   Result Value Ref Range    Sodium 139 133 - 144 mmol/L    Potassium 3.8 3.4 - 5.3 mmol/L    Chloride 107 94 - 109 mmol/L    Carbon Dioxide (CO2) 26 20 - 32 mmol/L    Anion Gap 6 3 - 14 mmol/L    Urea Nitrogen 23 7 - 30 mg/dL    Creatinine 1.02 0.66 - 1.25 mg/dL    Calcium 9.2 8.5 - 10.1 mg/dL    Glucose 100 (H) 70 - 99 mg/dL    Alkaline Phosphatase 53 40 - 150 U/L    AST 26 0 - 45 U/L    ALT 21 0 - 70 U/L    Protein Total 7.0 6.8 - 8.8 g/dL    Albumin 3.7 3.4 - 5.0 g/dL    Bilirubin Total 0.3 0.2 - 1.3 mg/dL    GFR Estimate 69 >60 mL/min/1.73m2      Comment:      Effective December 21, 2021 eGFRcr in adults is calculated using the 2021 CKD-EPI creatinine equation which includes age and gender (Pravin et al., NEJM, DOI: 10.1056/PYVXvv4651048)   Lipid panel reflex to direct LDL Fasting   Result Value Ref Range    Cholesterol 115 <200 mg/dL    Triglycerides 70 <150 mg/dL    Direct Measure HDL 52 >=40 mg/dL    LDL Cholesterol Calculated 49 <=100 mg/dL     Non HDL Cholesterol 63 <130 mg/dL    Patient Fasting > 8hrs? Yes     Narrative    Cholesterol  Desirable:  <200 mg/dL    Triglycerides  Normal:  Less than 150 mg/dL  Borderline High:  150-199 mg/dL  High:  200-499 mg/dL  Very High:  Greater than or equal to 500 mg/dL    Direct Measure HDL  Female:  Greater than or equal to 50 mg/dL   Male:  Greater than or equal to 40 mg/dL    LDL Cholesterol  Desirable:  <100mg/dL  Above Desirable:  100-129 mg/dL   Borderline High:  130-159 mg/dL   High:  160-189 mg/dL   Very High:  >= 190 mg/dL    Non HDL Cholesterol  Desirable:  130 mg/dL  Above Desirable:  130-159 mg/dL  Borderline High:  160-189 mg/dL  High:  190-219 mg/dL  Very High:  Greater than or equal to 220 mg/dL   CBC with platelets   Result Value Ref Range    WBC Count 6.5 4.0 - 11.0 10e3/uL    RBC Count 4.03 (L) 4.40 - 5.90 10e6/uL    Hemoglobin 11.4 (L) 13.3 - 17.7 g/dL    Hematocrit 35.4 (L) 40.0 - 53.0 %    MCV 88 78 - 100 fL    MCH 28.3 26.5 - 33.0 pg    MCHC 32.2 31.5 - 36.5 g/dL    RDW 13.8 10.0 - 15.0 %    Platelet Count 160 150 - 450 10e3/uL

## 2022-08-22 NOTE — PROGRESS NOTES
"SUBJECTIVE:   Jessica Mckeon is a 89 year old male who presents for Preventive Visit.    Are you in the first 12 months of your Medicare coverage?  No    Healthy Habits:    In general, how would you rate your overall health?  Fair    Frequency of exercise:  6-7 days/week    Duration of exercise:  15-30 minutes    Do you usually eat at least 4 servings of fruit and vegetables a day, include whole grains    & fiber and avoid regularly eating high fat or \"junk\" foods?  Yes    Taking medications regularly:  Yes    Barriers to taking medications:  None    Medication side effects:  None    Ability to successfully perform activities of daily living:  Telephone requires assistance, transportation requires assistance, housework requires assistance, shopping requires assistance, preparing meals requires assistance and laundry requires assistance    Home Safety:  No safety concerns identified    Hearing Impairment:  Difficulty following a conversation in a noisy restaurant or crowded room, feel that people are mumbling or not speaking clearly, difficulty following dialogue in the theater, difficult to understand a speaker at a public meeting or Vood[employer] service, need to ask people to speak up or repeat themselves, find that men's voices are easier to understand than woman's, difficulty understanding soft or whispered speech and difficulty understanding speech on the telephone    In the past 6 months, have you been bothered by leaking of urine?  No    In general, how would you rate your overall mental or emotional health?  Very good      PHQ-2 Total Score:    Additional concerns today:  No    Do you feel safe in your environment? Yes    Have you ever done Advance Care Planning? (For example, a Health Directive, POLST, or a discussion with a medical provider or your loved ones about your wishes): no    Fall risk  Fallen 2 or more times in the past year?: No  Any fall with injury in the past year?: No    Cognitive Screening " "  1) Repeat 3 items (Leader, Season, Table)    2) Clock draw:   3) 3 item recall: Recalls NO objects   Results: 0 items recalled: PROBABLE COGNITIVE IMPAIRMENT    Mini-CogTM Copyright S Neda. Licensed by the author for use in [facility] [employer]; reprinted with permission ([email or web address]). All rights reserved.      Here for wellness exam today.  Overall he is feeling well.    ASCVD, HTN, hyperlipidemia.   No cardiac sx such as CP, palpitations, PND, orthopnea, MADRID or peripheral edema.  Reviewed medications, updated rx. No changes needed.  BP Readings from Last 3 Encounters:   08/22/22 114/52   06/21/22 134/84   08/16/21 132/80     Lab Results   Component Value Date     08/16/2021    LDL 42 10/02/2018    LDL 52 06/29/2018     GERD. Sx controlled w/ pantoprazole. Had rx from GI. OK for me to fill today.     Social History     Tobacco Use     Smoking status: Never Smoker     Smokeless tobacco: Never Used   Substance Use Topics     Alcohol use: No     Alcohol/week: 0.0 standard drinks     If you drink alcohol do you typically have >3 drinks per day or >7 drinks per week? No    Current providers sharing in care for this patient include:   Patient Care Team:  Kale Thompson MD as PCP - General  Kale Thompson MD as Assigned PCP  Anila Lehman OD as Assigned Surgical Provider    The following health maintenance items are reviewed in Epic and correct as of today:  Health Maintenance Due   Topic Date Due     ANNUAL REVIEW OF HM ORDERS  Never done     DTAP/TDAP/TD IMMUNIZATION (1 - Tdap) Never done     ZOSTER IMMUNIZATION (1 of 2) Never done     COLORECTAL CANCER SCREENING  01/28/2016       OBJECTIVE:   /52 (BP Location: Right arm, Patient Position: Sitting, Cuff Size: Adult Regular)   Pulse 65   Temp (!) 96.7  F (35.9  C) (Tympanic)   Resp 18   Ht 1.651 m (5' 5\")   Wt 58.1 kg (128 lb 1.6 oz)   SpO2 99%   BMI 21.32 kg/m   Estimated body mass index is 21.32 kg/m  as calculated from the " "following:    Height as of this encounter: 1.651 m (5' 5\").    Weight as of this encounter: 58.1 kg (128 lb 1.6 oz).  Physical Exam  GENERAL: healthy, alert and no distress  EYES: PERRL, EOMI  HENT: ear canals and TM's normal  NECK: no adenopathy  RESP: lungs clear to auscultation - no rales, rhonchi or wheezes  CV: regular rate and rhythm, normal S1 S2, 1/6 systolic murmur, no peripheral edema and peripheral pulses strong  ABDOMEN: soft, nontender, bowel sounds normal  MS: no gross musculoskeletal defects noted, no edema  SKIN: no suspicious lesions or rashes  NEURO: Normal strength and tone  PSYCH: mentation appears normal, affect normal/bright       ASSESSMENT / PLAN:       ICD-10-CM    1. Encounter for Medicare annual wellness exam  Z00.00 Comprehensive metabolic panel     Lipid panel reflex to direct LDL Fasting     CBC with platelets   2. Hyperlipidemia LDL goal <70  E78.5 rosuvastatin (CRESTOR) 20 MG tablet     Lipid panel reflex to direct LDL Fasting   3. ASCVD (arteriosclerotic cardiovascular disease)  I25.10 metoprolol succinate ER (TOPROL XL) 25 MG 24 hr tablet     lisinopril (ZESTRIL) 10 MG tablet     amLODIPine (NORVASC) 5 MG tablet   4. Essential hypertension  I10 metoprolol succinate ER (TOPROL XL) 25 MG 24 hr tablet     lisinopril (ZESTRIL) 10 MG tablet     amLODIPine (NORVASC) 5 MG tablet   5. Gastroesophageal reflux disease without esophagitis  K21.9 pantoprazole (PROTONIX) 20 MG EC tablet     No medication changes made today.  Labwork drawn.     COUNSELING:  Reviewed preventive health counseling, as reflected in patient instructions    Estimated body mass index is 21.32 kg/m  as calculated from the following:    Height as of this encounter: 1.651 m (5' 5\").    Weight as of this encounter: 58.1 kg (128 lb 1.6 oz).    Weight management plan noted, stable and monitoring    He reports that he has never smoked. He has never used smokeless tobacco.      Appropriate preventive services were discussed " with this patient, including applicable screening as appropriate for cardiovascular disease, diabetes, osteopenia/osteoporosis, and glaucoma.  As appropriate for age/gender, discussed screening for colorectal cancer. Checklist reviewing preventive services available has been given to the patient.    Reviewed patients plan of care and provided an AVS. The Basic Care Plan (routine screening as documented in Health Maintenance) for Jessica meets the Care Plan requirement. This Care Plan has been established and reviewed with the Patient.    Counseling Resources:  ATP IV Guidelines  Pooled Cohorts Equation Calculator  Breast Cancer Risk Calculator  Breast Cancer: Medication to Reduce Risk  FRAX Risk Assessment  ICSI Preventive Guidelines  Dietary Guidelines for Americans, 2010  USDA's MyPlate  ASA Prophylaxis  Lung CA Screening    Kale Thompson MD  [facility]    Identified Health Risks:

## 2022-08-22 NOTE — PATIENT INSTRUCTIONS
Patient Education   Personalized Prevention Plan  You are due for the preventive services outlined below.  Your care team is available to assist you in scheduling these services.  If you have already completed any of these items, please share that information with your care team to update in your medical record.  Health Maintenance Due   Topic Date Due     ANNUAL REVIEW OF HM ORDERS  Never done     Diptheria Tetanus Pertussis (DTAP/TDAP/TD) Vaccine (1 - Tdap) Never done     Zoster (Shingles) Vaccine (1 of 2) Never done     Colorectal Cancer Screening  01/28/2016     PHQ-2 (once per calendar year)  01/01/2022     FALL RISK ASSESSMENT  08/16/2022     Annual Wellness Visit  08/16/2022

## 2022-11-30 NOTE — TELEPHONE ENCOUNTER
Called patient with  and left voicemail for patient to call us back to schedule an appointment.   1. Monitor PO intake/appetite, GI distress, diet tolerance, labs, weights.  2. Honor pt food preferences as able.  3. MVI.  4. RD to remain available for additional nutrition interventions as needed.

## 2023-05-08 ENCOUNTER — NURSE TRIAGE (OUTPATIENT)
Dept: PEDIATRICS | Facility: CLINIC | Age: 88
End: 2023-05-08

## 2023-05-08 ENCOUNTER — TELEPHONE (OUTPATIENT)
Dept: PEDIATRICS | Facility: CLINIC | Age: 88
End: 2023-05-08

## 2023-05-08 ENCOUNTER — OFFICE VISIT (OUTPATIENT)
Dept: PEDIATRICS | Facility: CLINIC | Age: 88
End: 2023-05-08
Payer: COMMERCIAL

## 2023-05-08 VITALS — DIASTOLIC BLOOD PRESSURE: 84 MMHG | TEMPERATURE: 97.4 F | SYSTOLIC BLOOD PRESSURE: 133 MMHG | HEART RATE: 84 BPM

## 2023-05-08 DIAGNOSIS — H91.93 BILATERAL CHANGE IN HEARING: Primary | ICD-10-CM

## 2023-05-08 PROCEDURE — 99213 OFFICE O/P EST LOW 20 MIN: CPT | Performed by: INTERNAL MEDICINE

## 2023-05-08 NOTE — TELEPHONE ENCOUNTER
Patient walked in.   Presented to  for moaning and appearing to be in distress.     Once back in the room patient was able to tell me his phone number. Wanted us to call his Wife.   He seemed to be overwhelmed by questions.     I was able to asked him why he came in today. He stated he can't hear anything. Though it seemed that he was able to hear my questions fine.   He showed me that both of his hearing aids were working.     Discussed with Dr. Thompson. He will see the patient.     VITALS:  /84  Temp 97.4  02 98  HR 84  R 30

## 2023-05-08 NOTE — TELEPHONE ENCOUNTER
Spoke with wife. Pt is scheduled at [facility] [place] 5/19 Friday at 1:30 arrival. Pt will be seeing Dr. Pérez.    Address: [address] [place] [id number] [phone]    Blanka Correa on 5/8/2023 at 3:11 PM

## 2023-05-08 NOTE — PROGRESS NOTES
Assessment & Plan       ICD-10-CM    1. Bilateral change in hearing  H91.93         Unclear cause for his hearing changes. No mechanical obstruction noted.   Hearing aids do appear to have functional batteries.   Has a small serous effusion, but only on the right side.  Recommend he return to see Dr. Harrell to further evaluate.     Kale Thompson MD  [facility] LAUREEN Lopez is a 89 year old, presenting for the following health issues:    HPI     Change in hearing  Jessica has noted a significant decline in his hearing over the past few weeks.  No injury recalled. No other signs of infection.  Has hearing aids, they do seem to be working.  He can tell that someone is speaking, but cannot interpret what they are saying.   He has been evaluated by ENT in the past, most recently Dr. Harrell in 2018.             Objective    /84 (BP Location: Left arm, Patient Position: Sitting, Cuff Size: Adult Regular)   Pulse 84   Temp 97.4  F (36.3  C) (Oral)   There is no height or weight on file to calculate BMI.  Physical Exam   GEN: No distress  HEENT: PERRL. No eye discharge. Ear canals are clear bilaterally. Small serous effusion on the right. No effusion on the left. No nasal discharge. OP moist.  NECK: Supple

## 2023-05-31 ENCOUNTER — TELEPHONE (OUTPATIENT)
Dept: PEDIATRICS | Facility: CLINIC | Age: 88
End: 2023-05-31

## 2023-05-31 ENCOUNTER — OFFICE VISIT (OUTPATIENT)
Dept: PEDIATRICS | Facility: CLINIC | Age: 88
End: 2023-05-31
Payer: COMMERCIAL

## 2023-05-31 VITALS
RESPIRATION RATE: 16 BRPM | WEIGHT: 120 LBS | HEART RATE: 71 BPM | HEIGHT: 65 IN | OXYGEN SATURATION: 99 % | BODY MASS INDEX: 19.99 KG/M2 | DIASTOLIC BLOOD PRESSURE: 62 MMHG | SYSTOLIC BLOOD PRESSURE: 140 MMHG | TEMPERATURE: 97.5 F

## 2023-05-31 DIAGNOSIS — E78.5 HYPERLIPIDEMIA LDL GOAL <70: ICD-10-CM

## 2023-05-31 DIAGNOSIS — M71.21 BAKER'S CYST OF KNEE, RIGHT: ICD-10-CM

## 2023-05-31 DIAGNOSIS — K21.9 GASTROESOPHAGEAL REFLUX DISEASE WITHOUT ESOPHAGITIS: ICD-10-CM

## 2023-05-31 DIAGNOSIS — I25.10 ASCVD (ARTERIOSCLEROTIC CARDIOVASCULAR DISEASE): Primary | ICD-10-CM

## 2023-05-31 DIAGNOSIS — K21.9 GASTROESOPHAGEAL REFLUX DISEASE WITHOUT ESOPHAGITIS: Primary | ICD-10-CM

## 2023-05-31 DIAGNOSIS — I10 ESSENTIAL HYPERTENSION: ICD-10-CM

## 2023-05-31 PROCEDURE — 99214 OFFICE O/P EST MOD 30 MIN: CPT | Performed by: INTERNAL MEDICINE

## 2023-05-31 RX ORDER — ROSUVASTATIN CALCIUM 20 MG/1
20 TABLET, COATED ORAL DAILY
Qty: 90 TABLET | Refills: 4 | Status: SHIPPED | OUTPATIENT
Start: 2023-05-31 | End: 2024-07-01

## 2023-05-31 RX ORDER — AMLODIPINE BESYLATE 5 MG/1
5 TABLET ORAL DAILY
Qty: 90 TABLET | Refills: 4 | Status: SHIPPED | OUTPATIENT
Start: 2023-05-31 | End: 2024-07-01

## 2023-05-31 RX ORDER — METOPROLOL SUCCINATE 25 MG/1
TABLET, EXTENDED RELEASE ORAL
Qty: 45 TABLET | Refills: 4 | Status: SHIPPED | OUTPATIENT
Start: 2023-05-31 | End: 2024-07-01

## 2023-05-31 RX ORDER — LISINOPRIL 10 MG/1
10 TABLET ORAL DAILY
Qty: 90 TABLET | Refills: 4 | Status: SHIPPED | OUTPATIENT
Start: 2023-05-31 | End: 2024-07-01

## 2023-05-31 RX ORDER — PANTOPRAZOLE SODIUM 20 MG/1
40 TABLET, DELAYED RELEASE ORAL DAILY
Qty: 180 TABLET | Refills: 4 | Status: SHIPPED | OUTPATIENT
Start: 2023-05-31 | End: 2024-07-01

## 2023-05-31 ASSESSMENT — ACTIVITIES OF DAILY LIVING (ADL): CURRENT_FUNCTION: NO ASSISTANCE NEEDED

## 2023-05-31 NOTE — PROGRESS NOTES
"SUBJECTIVE:   Jessica is a 89 year old who presents for Routine Visit.      5/31/2023     3:50 PM   Additional Questions   Roomed by [facility]   Accompanied by Wife, also interpretor via telephone     Visit was scheduled as an office visit. On arrival they requested AWV, but this was done in August 2022 so will not have AWV today, just review of his ongoing medical issues.     Healthy Habits:    In general, how would you rate your overall health?  Fair    Frequency of exercise:  6-7 days/week    Duration of exercise:  30-45 minutes    Do you usually eat at least 4 servings of fruit and vegetables a day, include whole grains    & fiber and avoid regularly eating high fat or \"junk\" foods?  Yes    Taking medications regularly:  Yes    Barriers to taking medications:  None    Medication side effects:  None    Ability to successfully perform activities of daily living:  No assistance needed    Home Safety:  No safety concerns identified    Hearing Impairment:  Difficulty following a conversation in a noisy restaurant or crowded room, need to ask people to speak up or repeat themselves, difficulty understanding soft or whispered speech, difficult to understand a speaker at a public meeting or Evangelical service and feel that people are mumbling or not speaking clearly    In the past 6 months, have you been bothered by leaking of urine?  No    In general, how would you rate your overall mental or emotional health?  Good      PHQ-2 Total Score:    Additional concerns today:  Yes (knee swelling )    Earlier this month evaluated for acute hearing changes.  He had a visit with ENT last week. Report is not available, but he was told that he has untreatable hearing loss. He does have HA which do seem to help somewhat.     ASCVD and HTN. No cardiac sx such as CP, palpitations, PND, orthopnea, MADRID or peripheral edema.  BP Readings from Last 3 Encounters:   05/31/23 (!) 140/62   05/08/23 133/84   08/22/22 114/52     Hyperlipidemia. " "Treated with statin. No med side effects noted.  Lab Results   Component Value Date    LDL 49 08/22/2022     08/16/2021    LDL 42 10/02/2018    LDL 52 06/29/2018     GERD. Sx are under control w/ his medications. No dysphagia.    Right knee swelling. Started after kneeling. Swelling is all posterior in the knee. Has not tried any interventions. No anterior swelling. Does not limit his activities at this time.      Social History     Tobacco Use     Smoking status: Never     Smokeless tobacco: Never   Vaping Use     Vaping status: Not on file   Substance Use Topics     Alcohol use: No     Alcohol/week: 0.0 standard drinks of alcohol         OBJECTIVE:   BP (!) 140/62   Pulse 71   Temp 97.5  F (36.4  C) (Temporal)   Resp 16   Ht 1.651 m (5' 5\")   Wt 54.4 kg (120 lb)   SpO2 99%   BMI 19.97 kg/m   Estimated body mass index is 19.97 kg/m  as calculated from the following:    Height as of this encounter: 1.651 m (5' 5\").    Weight as of this encounter: 54.4 kg (120 lb).  Physical Exam  GEN: No distress  NECK: Supple  LUNGS: Clear to auscultation bilaterally. No rhonchi, rales, wheezes or retractions.  CV: Regular rate and rhythm.  No murmurs, rubs or gallops. Pulses 2+ radial.  ABD: BS+. S, ND.   EXTR: firm swelling posterior right knee. No anterior effusion noted. Ligaments clinically intact.         ASSESSMENT / PLAN:       ICD-10-CM    1. ASCVD (arteriosclerotic cardiovascular disease)  I25.10 metoprolol succinate ER (TOPROL XL) 25 MG 24 hr tablet     lisinopril (ZESTRIL) 10 MG tablet     amLODIPine (NORVASC) 5 MG tablet      2. Essential hypertension  I10 metoprolol succinate ER (TOPROL XL) 25 MG 24 hr tablet     lisinopril (ZESTRIL) 10 MG tablet     amLODIPine (NORVASC) 5 MG tablet      3. Hyperlipidemia LDL goal <70  E78.5 rosuvastatin (CRESTOR) 20 MG tablet      4. Gastroesophageal reflux disease without esophagitis  K21.9 pantoprazole (PROTONIX) 20 MG EC tablet      5. Baker's cyst of knee, right  " M71.21         Reviewed his chronic medical issues today.   Medications were renewed as needed. He is not fasting today, will forego labwork at this time.   Baker's cyst right knee. Compression. Orthopedic referral if sx progress.       Kale Thompson MD  [facility]

## 2023-05-31 NOTE — TELEPHONE ENCOUNTER
Prior Authorization Retail Medication Request    Medication/Dose: Pantoprazole 20mg  ICD code (if different than what is on RX):    Previously Tried and Failed:    Rationale:      Insurance Name:  [facility]  Insurance ID:  [id number]      Pharmacy Information (if different than what is on RX)  Name:  [facility]  Phone:  [phone]    Plan limitations exceeded. MAXIMUM DAILY DOSE OF [id number]    Thank you,  Brandy Jones CPhT  [facility]

## 2023-06-01 PROBLEM — M71.21 BAKER'S CYST OF KNEE, RIGHT: Status: ACTIVE | Noted: 2023-06-01

## 2023-06-01 RX ORDER — PANTOPRAZOLE SODIUM 40 MG/1
40 TABLET, DELAYED RELEASE ORAL DAILY
Qty: 90 TABLET | Refills: 3 | Status: SHIPPED | OUTPATIENT
Start: 2023-06-01 | End: 2024-07-03

## 2023-06-01 NOTE — TELEPHONE ENCOUNTER
He has been taking 20 mg BID.  Please call pt or his wife to see if 40 mg daily is acceptable. If so OK to change rx.

## 2023-06-01 NOTE — TELEPHONE ENCOUNTER
Spoke with patient's wife, she is ok with only #1 40 mg tab instead of #2 20 mg tabs. Called [facility] back to advise, got VM left detailed message that ok to substitute #1 40 mg pantoprazole instead of #2-20 mg daily.     LISA Flowers on 6/1/2023 at 11:04 AM

## 2023-06-01 NOTE — TELEPHONE ENCOUNTER
Central Prior Authorization Team  Phone: [phone]    PA Initiation    Medication: PANTOPRAZOLE SODIUM 20 MG PO [facility]  Insurance Company: [employer] - Phone [phone] Fax [phone]  Pharmacy Filling the Rx: [facility] - [address]   Filling Pharmacy Phone: [phone]  Filling Pharmacy Fax:    Start Date: 6/1/2023

## 2023-06-01 NOTE — TELEPHONE ENCOUNTER
Medication: Pantoprazole 20mg  PA for pantoprazole 20mg was cancelled. New script for 40mg strength was sent to pharmacy.    Pharmacy Notified:  New rx for 40mg strength was sent to pharmacy.  Patient Notified:      Please close encounter when finished.    Thank you,  Central Prior Authorization Team  [phone]

## 2023-06-01 NOTE — TELEPHONE ENCOUNTER
Received call from [employer], inquiring if they should withdraw the PA for 20mg pantoprazole as medication is changing to 40mg. Confirmed [employer] should withdraw PA as insurance will cover 40mg per [employer]. Needs new rx. Sending new rx per PCPs approval in message below. Prescription approved per [facility] Refill Protocol.      Marco Antonio LEON RN 6/1/2023 at 11:28 AM

## 2023-06-01 NOTE — TELEPHONE ENCOUNTER
Pharmacist from [facility] asking about script 2 tabs once a day is he able to switch to 1 of the 40 mg tablets instead of 2 of the twenty.    This medication has an open PA.     Per chart review-     Script for Pantoprazole was sent for #2- 20 mg tablets once daily. Asking if patient can take #1- 40 mg tablet once daily.     Call back # [phone]. Reference # [id number]    Needs call back by 4 pm today.      Routing to provider for approval. Please advise.  Thank you.  LISA lFowers on 6/1/2023 at 9:35 AM

## 2023-10-19 ENCOUNTER — OFFICE VISIT (OUTPATIENT)
Dept: URGENT CARE | Facility: URGENT CARE | Age: 88
End: 2023-10-19
Payer: COMMERCIAL

## 2023-10-19 ENCOUNTER — TELEPHONE (OUTPATIENT)
Dept: PEDIATRICS | Facility: CLINIC | Age: 88
End: 2023-10-19

## 2023-10-19 VITALS
SYSTOLIC BLOOD PRESSURE: 150 MMHG | RESPIRATION RATE: 12 BRPM | HEART RATE: 62 BPM | TEMPERATURE: 98.9 F | OXYGEN SATURATION: 96 % | DIASTOLIC BLOOD PRESSURE: 75 MMHG

## 2023-10-19 VITALS — OXYGEN SATURATION: 90 % | HEART RATE: 90 BPM | DIASTOLIC BLOOD PRESSURE: 66 MMHG | SYSTOLIC BLOOD PRESSURE: 100 MMHG

## 2023-10-19 DIAGNOSIS — L30.9 DERMATITIS: Primary | ICD-10-CM

## 2023-10-19 DIAGNOSIS — J06.9 VIRAL URI: ICD-10-CM

## 2023-10-19 PROCEDURE — 99213 OFFICE O/P EST LOW 20 MIN: CPT | Performed by: PHYSICIAN ASSISTANT

## 2023-10-19 PROCEDURE — 87635 SARS-COV-2 COVID-19 AMP PRB: CPT | Performed by: PHYSICIAN ASSISTANT

## 2023-10-19 RX ORDER — CETIRIZINE HYDROCHLORIDE 10 MG/1
10 TABLET ORAL DAILY
Qty: 14 TABLET | Refills: 0 | Status: SHIPPED | OUTPATIENT
Start: 2023-10-19

## 2023-10-19 RX ORDER — BENZONATATE 100 MG/1
100 CAPSULE ORAL 3 TIMES DAILY PRN
Qty: 30 CAPSULE | Refills: 0 | Status: SHIPPED | OUTPATIENT
Start: 2023-10-19 | End: 2024-07-03

## 2023-10-19 NOTE — TELEPHONE ENCOUNTER
Patient presented to clinic. Called  services. Patient is hard of hearing. Was unable to understand  over the phone. Was able to determine that patient was here to schedule an appointment. Patient is c/o itchy skin all over his body. Patient was scratching a bump on his right forearm.    Patient has consent to communicate on file for his son, Charis. Called patient's son and advised that his father is here. We are unable to schedule him with PCP. Will need to follow-up with patient's son tomorrow after discussing with PCP about approval required spot.    Patient was escorted to Urgent Care by RN and assisted to check in at . Patient son was updated that patient will be at the Urgent Care at the  to be seen. Family has agreed to come and bring patient home after visit at .  Isabela EAST RN, BSN

## 2023-10-20 LAB — SARS-COV-2 RNA RESP QL NAA+PROBE: NEGATIVE

## 2023-10-20 NOTE — TELEPHONE ENCOUNTER
Huddled with patient's PCP. Okay to use [facility] spot for AWV. Will wait until COVID PRC results to determine date of visit.  Isabela EAST RN, BSN

## 2023-10-20 NOTE — PROGRESS NOTES
Assessment/Plan:    No acute distress or toxicity noted. Lungs CTAB, no signs of pneumonia. Suspect viral illness. Supportive treatments discussed- continue use of over the counter treatments such as acetaminophen, guaifenesin as needed. Rx Tessalon to use as well, cough is making it difficult to sleep.  Discussed that symptoms do overlap with possible COVID-19, and patient was tested for this today. Isolate while awaiting test results. If positive, will be contacted regarding treatment.    Rash does not appear c/w scabies or bed bugs. Likely due to contact dermatitis secondary to wearing new fabrics and staying in a different location. Advised using lotion daily to help keep skin moisturized, and may try cetirizine as well. Rx sent.    See patient instructions below.  At the end of the encounter, I discussed results, diagnosis, medications. Discussed red flags for immediate return to clinic/ER, as well as indications for follow up if no improvement. Patient understood and agreed to plan. Patient was stable for discharge.      ICD-10-CM    1. Dermatitis  L30.9 cetirizine (ZYRTEC) 10 MG tablet      2. Viral URI  J06.9 benzonatate (TESSALON) 100 MG capsule     Symptomatic COVID-19 Virus (Coronavirus) by PCR Nose            Return in about 2 weeks (around 11/2/2023) for Follow up w/ primary care provider if not better.    KATE Jackson, ADRIANNA  [facility]    ------------------------------------------------------------------------------------------------------------------------------------------------------------------------  HPI:  Jessica Mckeon is a 89 year old male who presents for evaluation of:    1. Dry cough onset 3 days ago. No treatments tried. Patient reports no fever/chills, myalgias, nasal congestion, sore throat, loss of sense of taste or smell, headache, chest pain, shortness of breath, abdominal pain, nausea, vomiting, diarrhea, rash, or any other symptoms. No known sick  contacts/COVID exposure.     2. Itchy rash on abdomen & bilateral arms onset 2 months ago. Rash started shortly after arriving in [place], and he was there for 2 months and returned 3 days ago. He was wearing different clothes than he usually does while there, and was staying with family. Reports another family member was also itchy while he was staying there. No treatments tried.      Past Medical History:   Diagnosis Date    Anxiety     Arthritis     mild in neck    Benign neoplasm of colon 12/2007    colonic polyps    BPPV (benign paroxysmal positional vertigo) 9/16/2008    Coronary artery disease     3/11: [facility]CA & JACK to prox Cfx    Gastro-oesophageal reflux disease     Heart attack (H) 4/29/2016    Hyperlipidemia     Near syncope     NED (obstructive sleep apnea) 11/13/2017    Palpitations     Syncope     Unspecified essential hypertension     Urinary sys symptom NEC     BPH sx    Vertigo        Vitals:    10/19/23 1812   BP: (!) 150/75   BP Location: Right arm   Patient Position: Sitting   Pulse: 62   Resp: 12   Temp: 98.9  F (37.2  C)   TempSrc: Tympanic   SpO2: 96%       Physical Exam  Vitals and nursing note reviewed.   HENT:      Nose: Nose normal.   Cardiovascular:      Rate and Rhythm: Normal rate and regular rhythm.      Heart sounds: Normal heart sounds.   Pulmonary:      Effort: Pulmonary effort is normal.      Breath sounds: Normal breath sounds.   Skin:     Findings: Rash present. Rash is macular (macular erythematous rash on lower abdomen and R forearm).   Neurological:      Mental Status: He is alert.         Labs/Imaging:  No results found for this or any previous visit (from the past 24 hour(s)).  No results found for this or any previous visit (from the past 24 hour(s)).      Patient Instructions     I recommend using moisturizing thick lotion or ointment such as Aquaphor, Eucerin, CeraVe, or Vanicream.  Apply twice daily, but especially after showering.  Use gentle fragrance-free soaps, such as  Dove, Cetaphil, Vanicream or CereVe  Use gentle fragrance-free detergents and unscented dryer sheets for washing clothes  Avoid irritants that can make your rash worse, such as fragrances, alpha hydroxyl acids, benzoyl peroxide, rubbing alcohol, hydrogen peroxide, wool, or dust  Follow-up in a couple weeks if not improving and sooner if worse.

## 2023-10-24 NOTE — TELEPHONE ENCOUNTER
Routing to Support Team: Please reach out to patient's son, Charis, and assist with scheduled AWV: [phone].    Okay to use EVELYN spot per PCP Dr. Thompson.    Thanks!  Isabela EAST RN, BSN

## 2023-10-30 ENCOUNTER — OFFICE VISIT (OUTPATIENT)
Dept: PEDIATRICS | Facility: CLINIC | Age: 88
End: 2023-10-30
Payer: COMMERCIAL

## 2023-10-30 VITALS
WEIGHT: 128.9 LBS | HEIGHT: 66 IN | OXYGEN SATURATION: 95 % | HEART RATE: 70 BPM | BODY MASS INDEX: 20.72 KG/M2 | RESPIRATION RATE: 20 BRPM | SYSTOLIC BLOOD PRESSURE: 131 MMHG | DIASTOLIC BLOOD PRESSURE: 67 MMHG

## 2023-10-30 DIAGNOSIS — Z00.00 ENCOUNTER FOR MEDICARE ANNUAL WELLNESS EXAM: Primary | ICD-10-CM

## 2023-10-30 DIAGNOSIS — I25.10 CORONARY ARTERY DISEASE INVOLVING NATIVE CORONARY ARTERY OF NATIVE HEART WITHOUT ANGINA PECTORIS: ICD-10-CM

## 2023-10-30 DIAGNOSIS — E78.5 HYPERLIPIDEMIA LDL GOAL <70: ICD-10-CM

## 2023-10-30 DIAGNOSIS — Z23 NEED FOR VACCINATION: ICD-10-CM

## 2023-10-30 DIAGNOSIS — I10 ESSENTIAL HYPERTENSION: ICD-10-CM

## 2023-10-30 PROBLEM — H01.001 BLEPHARITIS OF UPPER EYELIDS OF BOTH EYES, UNSPECIFIED TYPE: Status: RESOLVED | Noted: 2018-09-10 | Resolved: 2023-10-30

## 2023-10-30 PROBLEM — H01.004 BLEPHARITIS OF UPPER EYELIDS OF BOTH EYES, UNSPECIFIED TYPE: Status: RESOLVED | Noted: 2018-09-10 | Resolved: 2023-10-30

## 2023-10-30 LAB
ALBUMIN SERPL BCG-MCNC: 4 G/DL (ref 3.5–5.2)
ALP SERPL-CCNC: 53 U/L (ref 40–129)
ALT SERPL W P-5'-P-CCNC: 9 U/L (ref 0–70)
ANION GAP SERPL CALCULATED.3IONS-SCNC: 9 MMOL/L (ref 7–15)
AST SERPL W P-5'-P-CCNC: 34 U/L (ref 0–45)
BILIRUB SERPL-MCNC: 0.3 MG/DL
BUN SERPL-MCNC: 23.5 MG/DL (ref 8–23)
CALCIUM SERPL-MCNC: 9.4 MG/DL (ref 8.2–9.6)
CHLORIDE SERPL-SCNC: 101 MMOL/L (ref 98–107)
CHOLEST SERPL-MCNC: 154 MG/DL
CREAT SERPL-MCNC: 1.25 MG/DL (ref 0.67–1.17)
DEPRECATED HCO3 PLAS-SCNC: 27 MMOL/L (ref 22–29)
EGFRCR SERPLBLD CKD-EPI 2021: 53 ML/MIN/1.73M2
ERYTHROCYTE [DISTWIDTH] IN BLOOD BY AUTOMATED COUNT: 15.1 % (ref 10–15)
GLUCOSE SERPL-MCNC: 95 MG/DL (ref 70–99)
HCT VFR BLD AUTO: 36.9 % (ref 40–53)
HDLC SERPL-MCNC: 46 MG/DL
HGB BLD-MCNC: 11.6 G/DL (ref 13.3–17.7)
LDLC SERPL CALC-MCNC: 86 MG/DL
MCH RBC QN AUTO: 28 PG (ref 26.5–33)
MCHC RBC AUTO-ENTMCNC: 31.4 G/DL (ref 31.5–36.5)
MCV RBC AUTO: 89 FL (ref 78–100)
NONHDLC SERPL-MCNC: 108 MG/DL
PLATELET # BLD AUTO: 216 10E3/UL (ref 150–450)
POTASSIUM SERPL-SCNC: 5 MMOL/L (ref 3.4–5.3)
PROT SERPL-MCNC: 6.9 G/DL (ref 6.4–8.3)
RBC # BLD AUTO: 4.14 10E6/UL (ref 4.4–5.9)
SODIUM SERPL-SCNC: 137 MMOL/L (ref 135–145)
TRIGL SERPL-MCNC: 110 MG/DL
WBC # BLD AUTO: 5.6 10E3/UL (ref 4–11)

## 2023-10-30 PROCEDURE — G0439 PPPS, SUBSEQ VISIT: HCPCS | Performed by: INTERNAL MEDICINE

## 2023-10-30 PROCEDURE — 85027 COMPLETE CBC AUTOMATED: CPT | Performed by: INTERNAL MEDICINE

## 2023-10-30 PROCEDURE — G0008 ADMIN INFLUENZA VIRUS VAC: HCPCS | Performed by: INTERNAL MEDICINE

## 2023-10-30 PROCEDURE — 90662 IIV NO PRSV INCREASED AG IM: CPT | Performed by: INTERNAL MEDICINE

## 2023-10-30 PROCEDURE — 80061 LIPID PANEL: CPT | Performed by: INTERNAL MEDICINE

## 2023-10-30 PROCEDURE — 36415 COLL VENOUS BLD VENIPUNCTURE: CPT | Performed by: INTERNAL MEDICINE

## 2023-10-30 PROCEDURE — 80053 COMPREHEN METABOLIC PANEL: CPT | Performed by: INTERNAL MEDICINE

## 2023-10-30 RX ORDER — IBANDRONATE SODIUM 150 MG/1
150 TABLET, FILM COATED ORAL
Status: CANCELLED | OUTPATIENT
Start: 2023-10-30

## 2023-10-30 ASSESSMENT — PAIN SCALES - GENERAL: PAINLEVEL: MILD PAIN (2)

## 2023-10-30 NOTE — LETTER
November 2, 2023      Jessica Mckeon  [address] [place] [address]        Jessica:     Your tests are all complete. The results show:     1. Your cholesterol profile shows very good results.     Your Lipid Goals:   Cholesterol: Desirable is less than 200, Borderline is 200-240   HDL (Good Cholesterol): Desirable is greater than 40   LDL (Bad Cholesterol): Desirable is less than 100, Borderline 100-130   Triglycerides (Fats in the Blood): Desirable is less than 150,  Borderline is 150-200     2.  Your blood counts are similar to previous results. You remain mildly anemic (low red blood count) but this should not cause any symptoms.     3. Your metabolic panel, including liver and kidney function, glucose (blood sugar) and electrolytes, is significant for an increase in your urea nitrogen and creatinine. These levels relate to kidney function and indicate a slight decrease in kidney function. These levels are not worrisome unless they continue to increase.     We should recheck kidney labwork the next time you are in clinic.       Please feel free to contact me if you have any questions or concerns.       Kale Thompson                     Resulted Orders   Lipid panel reflex to direct LDL Fasting   Result Value Ref Range    Cholesterol 154 <200 mg/dL    Triglycerides 110 <150 mg/dL    Direct Measure HDL 46 >=40 mg/dL    LDL Cholesterol Calculated 86 <=100 mg/dL    Non HDL Cholesterol 108 <130 mg/dL    Narrative    Cholesterol  Desirable:  <200 mg/dL    Triglycerides  Normal:  Less than 150 mg/dL  Borderline High:  150-199 mg/dL  High:  200-499 mg/dL  Very High:  Greater than or equal to 500 mg/dL    Direct Measure HDL  Female:  Greater than or equal to 50 mg/dL   Male:  Greater than or equal to 40 mg/dL    LDL Cholesterol  Desirable:  <100mg/dL  Above Desirable:  100-129 mg/dL   Borderline High:  130-159 mg/dL   High:  160-189 mg/dL   Very High:  >= 190 mg/dL    Non HDL Cholesterol  Desirable:  130  mg/dL  Above Desirable:  130-159 mg/dL  Borderline High:  160-189 mg/dL  High:  190-219 mg/dL  Very High:  Greater than or equal to 220 mg/dL   Comprehensive metabolic panel   Result Value Ref Range    Sodium 137 135 - 145 mmol/L      Comment:      Reference intervals for this test were updated on 09/26/2023 to more accurately reflect our healthy population. There may be differences in the flagging of prior results with similar values performed with this method. Interpretation of those prior results can be made in the context of the updated reference intervals.     Potassium 5.0 3.4 - 5.3 mmol/L    Carbon Dioxide (CO2) 27 22 - 29 mmol/L    Anion Gap 9 7 - 15 mmol/L    Urea Nitrogen 23.5 (H) 8.0 - 23.0 mg/dL    Creatinine 1.25 (H) 0.67 - 1.17 mg/dL    GFR Estimate 53 (L) >60 mL/min/1.73m2    Calcium 9.4 8.2 - 9.6 mg/dL    Chloride 101 98 - 107 mmol/L    Glucose 95 70 - 99 mg/dL    Alkaline Phosphatase 53 40 - 129 U/L    AST 34 0 - 45 U/L      Comment:      Reference intervals for this test were updated on 6/12/2023 to more accurately reflect our healthy population. There may be differences in the flagging of prior results with similar values performed with this method. Interpretation of those prior results can be made in the context of the updated reference intervals.    ALT 9 0 - 70 U/L      Comment:      Reference intervals for this test were updated on 6/12/2023 to more accurately reflect our healthy population. There may be differences in the flagging of prior results with similar values performed with this method. Interpretation of those prior results can be made in the context of the updated reference intervals.      Protein Total 6.9 6.4 - 8.3 g/dL    Albumin 4.0 3.5 - 5.2 g/dL    Bilirubin Total 0.3 <=1.2 mg/dL   CBC with platelets   Result Value Ref Range    WBC Count 5.6 4.0 - 11.0 10e3/uL    RBC Count 4.14 (L) 4.40 - 5.90 10e6/uL    Hemoglobin 11.6 (L) 13.3 - 17.7 g/dL    Hematocrit 36.9 (L) 40.0 - 53.0 %     MCV 89 78 - 100 fL    MCH 28.0 26.5 - 33.0 pg    MCHC 31.4 (L) 31.5 - 36.5 g/dL    RDW 15.1 (H) 10.0 - 15.0 %    Platelet Count 216 150 - 450 10e3/uL

## 2023-10-30 NOTE — PATIENT INSTRUCTIONS
Patient Education   Personalized Prevention Plan  You are due for the preventive services outlined below.  Your care team is available to assist you in scheduling these services.  If you have already completed any of these items, please share that information with your care team to update in your medical record.  Health Maintenance Due   Topic Date Due     ANNUAL REVIEW OF HM ORDERS  Never done     Zoster (Shingles) Vaccine (1 of 2) Never done     RSV VACCINE 60+ (1 - 1-dose 60+ series) Never done     Eye Exam  06/14/2023     Annual Wellness Visit  08/22/2023     Flu Vaccine (1) 09/01/2023

## 2023-10-30 NOTE — PROGRESS NOTES
SUBJECTIVE:   Jessica is a 89 year old who presents for Preventive Visit.      10/30/2023     8:34 AM   Additional Questions   Roomed by Suze Muller   Accompanied by Wife       Are you in the first 12 months of your Medicare coverage?  No    HPI    Has rash on arms, went to  10/19  Sx are improving. Thought to be a contact allergy. Still has some pruritus on the back.  Taking cetirizine. Discussed can be purchased OTC.     Reviewed chronic health issues.    HTN, ASCVD. No palpitations, PND, orthopnea, MADRID or peripheral edema. Has occasional chest pain, but is stable over time. Exercises every day. Has not needed to change exertion level.     Hyperlipidemia. Tolerating statin.   Lab Results   Component Value Date    LDL 49 08/22/2022     08/16/2021    LDL 42 10/02/2018    LDL 52 06/29/2018        Have you ever done Advance Care Planning? (For example, a Health Directive, POLST, or a discussion with a medical provider or your loved ones about your wishes): No, advance care planning information given to patient to review.  Patient declined advance care planning discussion at this time.       Fall risk  Fallen 2 or more times in the past year?: No  Any fall with injury in the past year?: No    Cognitive Screening   1) Repeat 3 items (Leader, Season, Table)    2) Clock draw: NORMAL  3) 3 item recall: Recalls NO objects   Results: 0 items recalled: PROBABLE COGNITIVE IMPAIRMENT, **INFORM PROVIDER**    Mini-CogTM Copyright KATHI Red. Licensed by the author for use in [facility]; reprinted with permission (arnoldo@.[email or web address]). All rights reserved.      Do you have sleep apnea, excessive snoring or daytime drowsiness? : no        Social History     Tobacco Use    Smoking status: Never    Smokeless tobacco: Never   Substance Use Topics    Alcohol use: No     Alcohol/week: 0.0 standard drinks of alcohol         Do you have a current opioid prescription? No  Do you use any other controlled substances or  "medications that are not prescribed by a provider? None      Current providers sharing in care for this patient include:   Patient Care Team:  Kale Thompson MD as PCP - General  Kale Thompson MD as Assigned PCP  Anila Lehman OD as Assigned Surgical Provider    The following health maintenance items are reviewed in Epic and correct as of today:  Health Maintenance   Topic Date Due    ANNUAL REVIEW OF HM ORDERS  Never done    ZOSTER IMMUNIZATION (1 of 2) Never done    RSV VACCINE 60+ (1 - 1-dose 60+ series) Never done    EYE EXAM  06/14/2023    MEDICARE ANNUAL WELLNESS VISIT  08/22/2023    INFLUENZA VACCINE (1) 09/01/2023    FALL RISK ASSESSMENT  10/30/2024    ADVANCE CARE PLANNING  08/16/2026    LIPID  08/22/2027    PHQ-2 (once per calendar year)  Completed    Pneumococcal Vaccine: 65+ Years  Completed    IPV IMMUNIZATION  Aged Out    HPV IMMUNIZATION  Aged Out    MENINGITIS IMMUNIZATION  Aged Out    COLORECTAL CANCER SCREENING  Discontinued    DTAP/TDAP/TD IMMUNIZATION  Discontinued    COVID-19 Vaccine  Discontinued       OBJECTIVE:   /67 (BP Location: Right arm, Patient Position: Sitting, Cuff Size: Adult Regular)   Pulse 70   Resp 20   Ht 1.665 m (5' 5.55\")   Wt 58.5 kg (128 lb 14.4 oz)   SpO2 95%   BMI 21.09 kg/m   Estimated body mass index is 21.09 kg/m  as calculated from the following:    Height as of this encounter: 1.665 m (5' 5.55\").    Weight as of this encounter: 58.5 kg (128 lb 14.4 oz).  Physical Exam  GEN: No distress  NECK: Supple. No LAD or TM. No bruits.  LUNGS: Clear to auscultation bilaterally. No rhonchi, rales, wheezes or retractions.  CV: Regular rate and rhythm.  1/6 systolic murmur. No rubs or gallops. Pulses 2+ radial.   ABD: Bowel sounds positive throughout. Soft, nontender, nondistended. No organomegaly. No masses.  EXTR: No edema  PSYCH: Normal affect. Well groomed. Good eye contact.       ASSESSMENT / PLAN:       ICD-10-CM    1. Encounter for Medicare annual " wellness exam  Z00.00       2. Hyperlipidemia LDL goal <70  E78.5 Lipid panel reflex to direct LDL Fasting     Comprehensive metabolic panel      3. Essential hypertension  I10 Comprehensive metabolic panel     CBC with platelets      4. Need for vaccination  Z23 INFLUENZA VACCINE 65+ (FLUZONE HD)      5. Coronary artery disease involving native coronary artery of native heart without angina pectoris  I25.10         Overall doing well.  Stable mild chest pain w/o change over the past few years.  BP is controlled.  Check fasting labwork today.      COUNSELING:  Reviewed preventive health counseling, as reflected in patient instructions      He reports that he has never smoked. He has never used smokeless tobacco.      Appropriate preventive services were discussed with this patient, including applicable screening as appropriate for fall prevention, nutrition, physical activity, Tobacco-use cessation, weight loss and cognition.  Checklist reviewing preventive services available has been given to the patient.    Reviewed patients plan of care and provided an AVS. The Basic Care Plan (routine screening as documented in Health Maintenance) for Jessica meets the Care Plan requirement. This Care Plan has been established and reviewed with the Patient.        Kale Thompson MD  [facility]

## 2024-07-01 ENCOUNTER — OFFICE VISIT (OUTPATIENT)
Dept: PEDIATRICS | Facility: CLINIC | Age: 89
End: 2024-07-01
Payer: COMMERCIAL

## 2024-07-01 VITALS
BODY MASS INDEX: 21.81 KG/M2 | HEIGHT: 65 IN | HEART RATE: 68 BPM | SYSTOLIC BLOOD PRESSURE: 110 MMHG | DIASTOLIC BLOOD PRESSURE: 64 MMHG | RESPIRATION RATE: 14 BRPM | OXYGEN SATURATION: 97 % | TEMPERATURE: 97.1 F | WEIGHT: 130.9 LBS

## 2024-07-01 DIAGNOSIS — K21.9 GASTROESOPHAGEAL REFLUX DISEASE WITHOUT ESOPHAGITIS: ICD-10-CM

## 2024-07-01 DIAGNOSIS — I10 ESSENTIAL HYPERTENSION: ICD-10-CM

## 2024-07-01 DIAGNOSIS — E78.5 HYPERLIPIDEMIA LDL GOAL <70: ICD-10-CM

## 2024-07-01 DIAGNOSIS — I25.10 ASCVD (ARTERIOSCLEROTIC CARDIOVASCULAR DISEASE): Primary | ICD-10-CM

## 2024-07-01 PROCEDURE — 99214 OFFICE O/P EST MOD 30 MIN: CPT | Performed by: INTERNAL MEDICINE

## 2024-07-01 RX ORDER — AMLODIPINE BESYLATE 5 MG/1
5 TABLET ORAL DAILY
Qty: 90 TABLET | Refills: 4 | Status: SHIPPED | OUTPATIENT
Start: 2024-07-01 | End: 2024-07-05

## 2024-07-01 RX ORDER — METOPROLOL SUCCINATE 25 MG/1
TABLET, EXTENDED RELEASE ORAL
Qty: 45 TABLET | Refills: 4 | Status: SHIPPED | OUTPATIENT
Start: 2024-07-01 | End: 2024-07-05

## 2024-07-01 RX ORDER — PANTOPRAZOLE SODIUM 20 MG/1
20 TABLET, DELAYED RELEASE ORAL DAILY
Qty: 90 TABLET | Refills: 4 | Status: SHIPPED | OUTPATIENT
Start: 2024-07-01

## 2024-07-01 RX ORDER — LISINOPRIL 10 MG/1
10 TABLET ORAL DAILY
Qty: 90 TABLET | Refills: 4 | Status: SHIPPED | OUTPATIENT
Start: 2024-07-01 | End: 2024-07-02

## 2024-07-01 RX ORDER — RESPIRATORY SYNCYTIAL VIRUS VACCINE 120MCG/0.5
0.5 KIT INTRAMUSCULAR ONCE
Qty: 1 EACH | Refills: 0 | Status: CANCELLED | OUTPATIENT
Start: 2024-07-01 | End: 2024-07-01

## 2024-07-01 RX ORDER — ROSUVASTATIN CALCIUM 20 MG/1
20 TABLET, COATED ORAL DAILY
Qty: 90 TABLET | Refills: 4 | Status: SHIPPED | OUTPATIENT
Start: 2024-07-01 | End: 2024-07-05

## 2024-07-01 NOTE — PROGRESS NOTES
Preventive Care Visit  [facility]  Kale Thompson MD, Internal Medicine - Pediatrics  Jul 1, 2024      Assessment & Plan       ICD-10-CM    1. ASCVD (arteriosclerotic cardiovascular disease)  I25.10 amLODIPine (NORVASC) 5 MG tablet     metoprolol succinate ER (TOPROL XL) 25 MG 24 hr tablet     lisinopril (ZESTRIL) 10 MG tablet      2. Essential hypertension  I10 amLODIPine (NORVASC) 5 MG tablet     metoprolol succinate ER (TOPROL XL) 25 MG 24 hr tablet     lisinopril (ZESTRIL) 10 MG tablet      3. Hyperlipidemia LDL goal <70  E78.5 rosuvastatin (CRESTOR) 20 MG tablet      4. Gastroesophageal reflux disease without esophagitis  K21.9 pantoprazole (PROTONIX) 20 MG EC tablet        Overall he is feeling well.  No medication changes were needed today.   Labwork was completed last October, does not need labwork drawn today.   Recommend routine follow-up with me in about 1 year.     Kale Thompson MD      Cindi Lopez is a 89 year old, presenting for the following:  Hypertension        7/1/2024     1:43 PM   Additional Questions   Roomed by ID   Accompanied by Wife         7/1/2024     1:43 PM   Patient Reported Additional Medications   Patient reports taking the following new medications None     Health Care Directive  Patient does not have a Health Care Directive or Living Will: Discussed advance care planning with patient; however, patient declined at this time.    History of Present Illness       Reason for visit:  Physical    He eats 2-3 servings of fruits and vegetables daily.He consumes 0 sweetened beverage(s) daily.He exercises with enough effort to increase his heart rate 60 or more minutes per day.  He exercises with enough effort to increase his heart rate 7 days per week.   He is taking medications regularly.    This visit was scheduled as an office visit. He requested AWV on arrival, but his last was less than 1 year ago. Kept the appt as an office visit.    We reviewed his ongoing  health issues.    HTN and ASCVD. No cardiac sx such as CP, palpitations, PND, orthopnea, MADRID or peripheral edema.  BP Readings from Last 3 Encounters:   07/01/24 110/64   10/30/23 131/67   10/19/23 (!) 150/75     Hyperlipidemia. Tolerating meds well.  Lab Results   Component Value Date    LDL 86 10/30/2023    LDL 49 08/22/2022    LDL 42 10/02/2018    LDL 52 06/29/2018     GERD. No difficulty swallowing.         Today's PHQ-2 Score:       7/1/2024     1:41 PM   PHQ-2 ( 1999 [employer])   Q1: Little interest or pleasure in doing things 0   Q2: Feeling down, depressed or hopeless 0   PHQ-2 Score 0   Q1: Little interest or pleasure in doing things Not at all   Q2: Feeling down, depressed or hopeless Not at all   PHQ-2 Score 0             Social History     Tobacco Use    Smoking status: Never     Passive exposure: Never    Smokeless tobacco: Never   Vaping Use    Vaping status: Never Used   Substance Use Topics    Alcohol use: No     Alcohol/week: 0.0 standard drinks of alcohol    Drug use: No             Reviewed and updated as needed this visit by Provider                      Current providers sharing in care for this patient include:  Patient Care Team:  Kale Thompson MD as PCP - General  Kale Thompson MD as Assigned PCP    The following health maintenance items are reviewed in Epic and correct as of today:  Health Maintenance   Topic Date Due    ANNUAL REVIEW OF  ORDERS  Never done    ZOSTER IMMUNIZATION (1 of 2) Never done    RSV VACCINE (Pregnancy & 60+) (1 - 1-dose 60+ series) Never done    EYE EXAM  06/14/2023    INFLUENZA VACCINE (1) 09/01/2024    MEDICARE ANNUAL WELLNESS VISIT  10/30/2024    FALL RISK ASSESSMENT  10/30/2024    LIPID  10/30/2028    ADVANCE CARE PLANNING  07/01/2029    PHQ-2 (once per calendar year)  Completed    Pneumococcal Vaccine: 65+ Years  Completed    IPV IMMUNIZATION  Aged Out    HPV IMMUNIZATION  Aged Out    MENINGITIS IMMUNIZATION  Aged Out    RSV MONOCLONAL ANTIBODY  Aged Out     "COLORECTAL CANCER SCREENING  Discontinued    DTAP/TDAP/TD IMMUNIZATION  Discontinued    COVID-19 Vaccine  Discontinued            Objective    Exam  /64 (BP Location: Right arm, Patient Position: Sitting, Cuff Size: Adult Regular)   Pulse 68   Temp 97.1  F (36.2  C) (Temporal)   Resp 14   Ht 1.66 m (5' 5.35\")   Wt 59.4 kg (130 lb 14.4 oz)   SpO2 97%   BMI 21.55 kg/m     Estimated body mass index is 21.55 kg/m  as calculated from the following:    Height as of this encounter: 1.66 m (5' 5.35\").    Weight as of this encounter: 59.4 kg (130 lb 14.4 oz).    Physical Exam  GEN: No distress  SKIN: No rashes  HEENT: PERRL. EOMI. TM's clear bilaterally. Nasal mucosa normal. OP moist without lesions.  NECK: Supple. No LAD or TM.   LUNGS: Clear to auscultation bilaterally. No rhonchi, rales, wheezes or retractions.  CV: Regular rate and rhythm.  No murmurs, rubs or gallops. Pulses 2+ radial.  ABD: BS+. S, ND.  EXTR: No edema    Signed Electronically by: Kale Thompson MD    "

## 2024-07-02 DIAGNOSIS — I25.10 ASCVD (ARTERIOSCLEROTIC CARDIOVASCULAR DISEASE): ICD-10-CM

## 2024-07-02 DIAGNOSIS — I10 ESSENTIAL HYPERTENSION: ICD-10-CM

## 2024-07-02 RX ORDER — LISINOPRIL 10 MG/1
10 TABLET ORAL DAILY
Qty: 90 TABLET | Refills: 3 | Status: SHIPPED | OUTPATIENT
Start: 2024-07-02

## 2024-07-03 DIAGNOSIS — K21.9 GASTROESOPHAGEAL REFLUX DISEASE WITHOUT ESOPHAGITIS: ICD-10-CM

## 2024-07-03 RX ORDER — PANTOPRAZOLE SODIUM 40 MG/1
40 TABLET, DELAYED RELEASE ORAL DAILY
Qty: 90 TABLET | Refills: 1 | OUTPATIENT
Start: 2024-07-03

## 2024-07-04 DIAGNOSIS — I10 ESSENTIAL HYPERTENSION: ICD-10-CM

## 2024-07-04 DIAGNOSIS — I25.10 ASCVD (ARTERIOSCLEROTIC CARDIOVASCULAR DISEASE): ICD-10-CM

## 2024-07-04 DIAGNOSIS — E78.5 HYPERLIPIDEMIA LDL GOAL <70: ICD-10-CM

## 2024-07-05 RX ORDER — METOPROLOL SUCCINATE 25 MG/1
TABLET, EXTENDED RELEASE ORAL
Qty: 45 TABLET | Refills: 2 | OUTPATIENT
Start: 2024-07-05

## 2024-07-05 RX ORDER — ROSUVASTATIN CALCIUM 20 MG/1
20 TABLET, COATED ORAL DAILY
Qty: 90 TABLET | Refills: 2 | OUTPATIENT
Start: 2024-07-05

## 2024-07-05 RX ORDER — METOPROLOL SUCCINATE 25 MG/1
TABLET, EXTENDED RELEASE ORAL
Qty: 45 TABLET | Refills: 3 | Status: SHIPPED | OUTPATIENT
Start: 2024-07-05

## 2024-07-05 RX ORDER — AMLODIPINE BESYLATE 5 MG/1
5 TABLET ORAL DAILY
Qty: 90 TABLET | Refills: 3 | Status: SHIPPED | OUTPATIENT
Start: 2024-07-05

## 2024-07-05 RX ORDER — ROSUVASTATIN CALCIUM 20 MG/1
20 TABLET, COATED ORAL DAILY
Qty: 90 TABLET | Refills: 3 | Status: SHIPPED | OUTPATIENT
Start: 2024-07-05

## 2024-07-05 RX ORDER — AMLODIPINE BESYLATE 5 MG/1
5 TABLET ORAL DAILY
Qty: 90 TABLET | Refills: 2 | OUTPATIENT
Start: 2024-07-05

## 2024-07-31 ENCOUNTER — TELEPHONE (OUTPATIENT)
Dept: PEDIATRICS | Facility: CLINIC | Age: 89
End: 2024-07-31
Payer: COMMERCIAL

## 2024-07-31 NOTE — TELEPHONE ENCOUNTER
08/02/2024 - called and spoke with wife. Stated patient had an appointment last month and does not want to schedule AWV at this time.     08/01/2024 called and left message for patient to call back to schedule his AWV for on or after 10/31/2024.     7/31/2024 called and left message for patient to call back to schedule his AWV on or after 10/31/2024. If patient has not called back,please leave for  staff to follow up.

## 2024-08-13 ENCOUNTER — OFFICE VISIT (OUTPATIENT)
Dept: OPTOMETRY | Facility: CLINIC | Age: 89
End: 2024-08-13
Payer: COMMERCIAL

## 2024-08-13 DIAGNOSIS — H52.4 PRESBYOPIA: ICD-10-CM

## 2024-08-13 DIAGNOSIS — Z96.1 PSEUDOPHAKIA: ICD-10-CM

## 2024-08-13 DIAGNOSIS — H52.03 HYPEROPIA OF BOTH EYES WITH ASTIGMATISM: Primary | ICD-10-CM

## 2024-08-13 DIAGNOSIS — H04.129 DRY EYE: ICD-10-CM

## 2024-08-13 DIAGNOSIS — H35.3210 EXUDATIVE AGE-RELATED MACULAR DEGENERATION OF RIGHT EYE, UNSPECIFIED STAGE (H): ICD-10-CM

## 2024-08-13 DIAGNOSIS — H01.006 BLEPHARITIS OF BOTH EYES, UNSPECIFIED EYELID, UNSPECIFIED TYPE: ICD-10-CM

## 2024-08-13 DIAGNOSIS — H35.30 MACULAR DEGENERATION (SENILE) OF RETINA: ICD-10-CM

## 2024-08-13 DIAGNOSIS — H01.003 BLEPHARITIS OF BOTH EYES, UNSPECIFIED EYELID, UNSPECIFIED TYPE: ICD-10-CM

## 2024-08-13 DIAGNOSIS — H52.203 HYPEROPIA OF BOTH EYES WITH ASTIGMATISM: Primary | ICD-10-CM

## 2024-08-13 PROCEDURE — 92014 COMPRE OPH EXAM EST PT 1/>: CPT | Performed by: OPTOMETRIST

## 2024-08-13 PROCEDURE — 92015 DETERMINE REFRACTIVE STATE: CPT | Performed by: OPTOMETRIST

## 2024-08-13 ASSESSMENT — REFRACTION_WEARINGRX
OD_AXIS: 178
OS_SPHERE: -1.25
OS_ADD: +2.50
OS_AXIS: 002
OD_ADD: +2.50
SPECS_TYPE: BIFOCAL
OD_SPHERE: -1.25
OD_CYLINDER: +3.00
OS_CYLINDER: +3.50

## 2024-08-13 ASSESSMENT — REFRACTION_MANIFEST
OS_ADD: +2.75
OS_SPHERE: -1.50
OD_AXIS: 159
OS_AXIS: 005
OD_CYLINDER: +2.75
OD_SPHERE: -0.75
METHOD_AUTOREFRACTION: 1
OS_SPHERE: -1.25
OD_SPHERE: -1.50
OD_ADD: +2.75
OD_AXIS: 013
OS_CYLINDER: +3.75
OD_CYLINDER: +3.75
OS_AXIS: 169
OS_CYLINDER: +3.50

## 2024-08-13 ASSESSMENT — KERATOMETRY
OD_AXISANGLE2_DEGREES: 72
OS_AXISANGLE2_DEGREES: 85
OS_AXISANGLE_DEGREES: 175
OD_K2POWER_DIOPTERS: 45.12
OS_K2POWER_DIOPTERS: 45
OD_K1POWER_DIOPTERS: 42
OD_AXISANGLE_DEGREES: 162
OS_K1POWER_DIOPTERS: 41.37

## 2024-08-13 ASSESSMENT — EXTERNAL EXAM - LEFT EYE: OS_EXAM: MULTIPLE NEVI

## 2024-08-13 ASSESSMENT — CUP TO DISC RATIO
OS_RATIO: 0.35
OD_RATIO: 0.45

## 2024-08-13 ASSESSMENT — VISUAL ACUITY
CORRECTION_TYPE: GLASSES
OD_CC+: -2
OS_CC: 20/40
OD_CC: 20/30-1
OS_CC: 20/30
METHOD: SNELLEN - LINEAR
OD_CC: 20/40

## 2024-08-13 ASSESSMENT — CONF VISUAL FIELD
OS_SUPERIOR_NASAL_RESTRICTION: 0
METHOD: COUNTING FINGERS
OS_NORMAL: 1
OD_INFERIOR_NASAL_RESTRICTION: 0
OD_SUPERIOR_NASAL_RESTRICTION: 0
OS_INFERIOR_NASAL_RESTRICTION: 0
OD_SUPERIOR_TEMPORAL_RESTRICTION: 0
OD_INFERIOR_TEMPORAL_RESTRICTION: 0
OS_INFERIOR_TEMPORAL_RESTRICTION: 0
OS_SUPERIOR_TEMPORAL_RESTRICTION: 0
OD_NORMAL: 1

## 2024-08-13 ASSESSMENT — EXTERNAL EXAM - RIGHT EYE: OD_EXAM: MULTIPLE NEVI

## 2024-08-13 ASSESSMENT — TONOMETRY
IOP_METHOD: APPLANATION
OD_IOP_MMHG: 15
OS_IOP_MMHG: 15

## 2024-08-13 NOTE — LETTER
8/13/2024      Jessica Mckeon  [address] [place] [address]      Dear Colleague,    Thank you for referring your patient, Jessica Mckeon, to the [facility]. Please see a copy of my visit note below.    Chief Complaint   Patient presents with     Annual Eye Exam      Accompanied by wife who is helping interpret   Last Eye Exam: 06/2022  Dilated Previously: Yes, side effects of dilation explained today    What are you currently using to see?  glasses       Distance Vision Acuity: Satisfied with vision    Near Vision Acuity: Satisfied with vision while reading and using computer with glasses    Eye Comfort: dry  Do you use eye drops? : Yes: OTC artificial tears when needed      Jadyn Harris - Optometric Assistant       Uses lid scrubs  History of retinal heme right eye, sees a specialist in [place] for this   Had a vitreous heme L seen at [email or web address]  CE each eye   Medical, surgical and family histories reviewed and updated 8/13/2024.       OBJECTIVE: See Ophthalmology exam    ASSESSMENT:    ICD-10-CM    1. Hyperopia of both eyes with astigmatism  H52.03     H52.203       2. Presbyopia  H52.4       3. Pseudophakia  Z96.1       4. Blepharitis of both eyes, unspecified eyelid, unspecified type  H01.003     H01.006       5. Dry eye  H04.129       6. Macular degeneration (senile) of retina  H35.30       7. Exudative age-related macular degeneration of right eye, unspecified stage (H)  H35.3210           PLAN:   He sees a retina specialist in [place] where he lives part time does not want to be seen by retina here  Does not want to update glasses, doing fine  Artificial tears  / lid hygiene    Anila Lehman OD     Again, thank you for allowing me to participate in the care of your patient.        Sincerely,        Anila Lehman, OD

## 2024-08-13 NOTE — PROGRESS NOTES
Chief Complaint   Patient presents with    Annual Eye Exam      Accompanied by wife who is helping interpret   Last Eye Exam: 06/2022  Dilated Previously: Yes, side effects of dilation explained today    What are you currently using to see?  glasses       Distance Vision Acuity: Satisfied with vision    Near Vision Acuity: Satisfied with vision while reading and using computer with glasses    Eye Comfort: dry  Do you use eye drops? : Yes: OTC artificial tears when needed      Jadyn Harris - Optometric Assistant       Uses lid scrubs  History of retinal heme right eye, sees a specialist in [place] for this   Had a vitreous heme L seen at [facility]  CE each eye   Medical, surgical and family histories reviewed and updated 8/13/2024.       OBJECTIVE: See Ophthalmology exam    ASSESSMENT:    ICD-10-CM    1. Hyperopia of both eyes with astigmatism  H52.03     H52.203       2. Presbyopia  H52.4       3. Pseudophakia  Z96.1       4. Blepharitis of both eyes, unspecified eyelid, unspecified type  H01.003     H01.006       5. Dry eye  H04.129       6. Macular degeneration (senile) of retina  H35.30       7. Exudative age-related macular degeneration of right eye, unspecified stage (H)  H35.3210           PLAN:   He sees a retina specialist in [place] where he lives part time does not want to be seen by retina here  Does not want to update glasses, doing fine  Artificial tears  / lid hygiene    Anila Lehman OD    Universal Safety Interventions